# Patient Record
Sex: MALE | Race: WHITE | HISPANIC OR LATINO | URBAN - METROPOLITAN AREA
[De-identification: names, ages, dates, MRNs, and addresses within clinical notes are randomized per-mention and may not be internally consistent; named-entity substitution may affect disease eponyms.]

---

## 2017-06-23 ENCOUNTER — OUTPATIENT (OUTPATIENT)
Dept: OUTPATIENT SERVICES | Facility: HOSPITAL | Age: 72
LOS: 1 days | Discharge: HOME | End: 2017-06-23

## 2017-06-23 DIAGNOSIS — C25.9 MALIGNANT NEOPLASM OF PANCREAS, UNSPECIFIED: ICD-10-CM

## 2017-06-23 DIAGNOSIS — K90.81 WHIPPLE'S DISEASE: Chronic | ICD-10-CM

## 2017-06-23 DIAGNOSIS — C25.9 MALIGNANT NEOPLASM OF PANCREAS, UNSPECIFIED: Chronic | ICD-10-CM

## 2017-06-29 ENCOUNTER — RESULT REVIEW (OUTPATIENT)
Age: 72
End: 2017-06-29

## 2017-06-29 ENCOUNTER — APPOINTMENT (OUTPATIENT)
Dept: HEMATOLOGY ONCOLOGY | Facility: CLINIC | Age: 72
End: 2017-06-29

## 2017-06-29 VITALS
TEMPERATURE: 98.3 F | SYSTOLIC BLOOD PRESSURE: 122 MMHG | HEART RATE: 72 BPM | DIASTOLIC BLOOD PRESSURE: 80 MMHG | OXYGEN SATURATION: 100 % | WEIGHT: 150.35 LBS | RESPIRATION RATE: 16 BRPM | BODY MASS INDEX: 24.75 KG/M2

## 2017-06-29 LAB
ALBUMIN SERPL ELPH-MCNC: 4 G/DL — SIGNIFICANT CHANGE UP (ref 3.3–5)
ALP SERPL-CCNC: 113 U/L — SIGNIFICANT CHANGE UP (ref 40–120)
ALT FLD-CCNC: 19 U/L — SIGNIFICANT CHANGE UP (ref 10–45)
ANION GAP SERPL CALC-SCNC: 18 MMOL/L — HIGH (ref 5–17)
AST SERPL-CCNC: 20 U/L — SIGNIFICANT CHANGE UP (ref 10–40)
BASOPHILS # BLD AUTO: 0 K/UL — SIGNIFICANT CHANGE UP (ref 0–0.2)
BASOPHILS NFR BLD AUTO: 0.6 % — SIGNIFICANT CHANGE UP (ref 0–2)
BILIRUB SERPL-MCNC: 0.4 MG/DL — SIGNIFICANT CHANGE UP (ref 0.2–1.2)
BUN SERPL-MCNC: 13 MG/DL — SIGNIFICANT CHANGE UP (ref 7–23)
CALCIUM SERPL-MCNC: 9.4 MG/DL — SIGNIFICANT CHANGE UP (ref 8.4–10.5)
CEA SERPL-MCNC: 6.3 NG/ML — HIGH (ref 0–3.8)
CHLORIDE SERPL-SCNC: 100 MMOL/L — SIGNIFICANT CHANGE UP (ref 96–108)
CO2 SERPL-SCNC: 26 MMOL/L — SIGNIFICANT CHANGE UP (ref 22–31)
CREAT SERPL-MCNC: 0.97 MG/DL — SIGNIFICANT CHANGE UP (ref 0.5–1.3)
EOSINOPHIL # BLD AUTO: 0.1 K/UL — SIGNIFICANT CHANGE UP (ref 0–0.5)
EOSINOPHIL NFR BLD AUTO: 2.1 % — SIGNIFICANT CHANGE UP (ref 0–6)
FERRITIN SERPL-MCNC: 82 NG/ML — SIGNIFICANT CHANGE UP (ref 30–400)
GLUCOSE SERPL-MCNC: 230 MG/DL — HIGH (ref 70–99)
IRON SATN MFR SERPL: 30 % — SIGNIFICANT CHANGE UP (ref 16–55)
IRON SATN MFR SERPL: 92 UG/DL — SIGNIFICANT CHANGE UP (ref 45–165)
LYMPHOCYTES # BLD AUTO: 1.2 K/UL — SIGNIFICANT CHANGE UP (ref 1–3.3)
LYMPHOCYTES # BLD AUTO: 27.2 % — SIGNIFICANT CHANGE UP (ref 13–44)
MONOCYTES # BLD AUTO: 0.2 K/UL — SIGNIFICANT CHANGE UP (ref 0–0.9)
MONOCYTES NFR BLD AUTO: 5 % — SIGNIFICANT CHANGE UP (ref 2–14)
NEUTROPHILS # BLD AUTO: 2.8 K/UL — SIGNIFICANT CHANGE UP (ref 1.8–7.4)
NEUTROPHILS NFR BLD AUTO: 65.1 % — SIGNIFICANT CHANGE UP (ref 43–77)
POTASSIUM SERPL-MCNC: 5 MMOL/L — SIGNIFICANT CHANGE UP (ref 3.5–5.3)
POTASSIUM SERPL-SCNC: 5 MMOL/L — SIGNIFICANT CHANGE UP (ref 3.5–5.3)
PROT SERPL-MCNC: 7.1 G/DL — SIGNIFICANT CHANGE UP (ref 6–8.3)
SODIUM SERPL-SCNC: 144 MMOL/L — SIGNIFICANT CHANGE UP (ref 135–145)
TIBC SERPL-MCNC: 304 UG/DL — SIGNIFICANT CHANGE UP (ref 220–430)
UIBC SERPL-MCNC: 212 UG/DL — SIGNIFICANT CHANGE UP (ref 110–370)

## 2017-06-29 RX ORDER — NYSTATIN 100000 [USP'U]/G
100000 CREAM TOPICAL
Qty: 30 | Refills: 0 | Status: ACTIVE | COMMUNITY
Start: 2017-01-23

## 2017-06-29 RX ORDER — NEOMYCIN AND POLYMYXIN B SULFATES AND DEXAMETHASONE 3.5; 10000; 1 MG/G; [IU]/G; MG/G
3.5-10000-0.1 OINTMENT OPHTHALMIC
Qty: 4 | Refills: 0 | Status: ACTIVE | COMMUNITY
Start: 2017-06-20

## 2017-06-29 RX ORDER — OFLOXACIN 3 MG/ML
0.3 SOLUTION/ DROPS OPHTHALMIC
Qty: 10 | Refills: 0 | Status: ACTIVE | COMMUNITY
Start: 2016-12-29

## 2017-06-29 RX ORDER — FINASTERIDE 5 MG/1
5 TABLET, FILM COATED ORAL
Qty: 30 | Refills: 0 | Status: ACTIVE | COMMUNITY
Start: 2017-04-05

## 2017-06-29 RX ORDER — PREDNISOLONE ACETATE 10 MG/ML
1 SUSPENSION/ DROPS OPHTHALMIC
Qty: 10 | Refills: 0 | Status: ACTIVE | COMMUNITY
Start: 2017-01-23

## 2017-06-29 RX ORDER — POLYVINYL ALCOHOL 14 MG/ML
1.4 SOLUTION/ DROPS OPHTHALMIC
Qty: 15 | Refills: 0 | Status: ACTIVE | COMMUNITY
Start: 2017-06-20

## 2017-06-29 RX ORDER — OMEPRAZOLE 40 MG/1
40 CAPSULE, DELAYED RELEASE ORAL
Qty: 30 | Refills: 2 | Status: ACTIVE | COMMUNITY
Start: 2017-06-29

## 2017-06-29 RX ORDER — TAMSULOSIN HYDROCHLORIDE 0.4 MG/1
0.4 CAPSULE ORAL
Qty: 30 | Refills: 0 | Status: ACTIVE | COMMUNITY
Start: 2017-04-05

## 2017-06-30 LAB — CANCER AG19-9 SERPL-ACNC: 16.1 U/ML — SIGNIFICANT CHANGE UP

## 2017-07-05 DIAGNOSIS — C24.1 MALIGNANT NEOPLASM OF AMPULLA OF VATER: ICD-10-CM

## 2017-07-07 ENCOUNTER — OUTPATIENT (OUTPATIENT)
Dept: OUTPATIENT SERVICES | Facility: HOSPITAL | Age: 72
LOS: 1 days | End: 2017-07-07
Payer: MEDICAID

## 2017-07-07 ENCOUNTER — APPOINTMENT (OUTPATIENT)
Dept: CT IMAGING | Facility: IMAGING CENTER | Age: 72
End: 2017-07-07

## 2017-07-07 DIAGNOSIS — C24.1 MALIGNANT NEOPLASM OF AMPULLA OF VATER: ICD-10-CM

## 2017-07-07 DIAGNOSIS — K90.81 WHIPPLE'S DISEASE: Chronic | ICD-10-CM

## 2017-07-07 DIAGNOSIS — C25.9 MALIGNANT NEOPLASM OF PANCREAS, UNSPECIFIED: Chronic | ICD-10-CM

## 2017-07-07 PROCEDURE — 82565 ASSAY OF CREATININE: CPT

## 2017-07-07 PROCEDURE — 74177 CT ABD & PELVIS W/CONTRAST: CPT

## 2017-07-21 ENCOUNTER — APPOINTMENT (OUTPATIENT)
Dept: ULTRASOUND IMAGING | Facility: IMAGING CENTER | Age: 72
End: 2017-07-21

## 2017-07-21 ENCOUNTER — OUTPATIENT (OUTPATIENT)
Dept: OUTPATIENT SERVICES | Facility: HOSPITAL | Age: 72
LOS: 1 days | End: 2017-07-21
Payer: MEDICAID

## 2017-07-21 ENCOUNTER — APPOINTMENT (OUTPATIENT)
Dept: MRI IMAGING | Facility: IMAGING CENTER | Age: 72
End: 2017-07-21

## 2017-07-21 DIAGNOSIS — K90.81 WHIPPLE'S DISEASE: Chronic | ICD-10-CM

## 2017-07-21 DIAGNOSIS — C25.9 MALIGNANT NEOPLASM OF PANCREAS, UNSPECIFIED: Chronic | ICD-10-CM

## 2017-07-21 DIAGNOSIS — Z00.8 ENCOUNTER FOR OTHER GENERAL EXAMINATION: ICD-10-CM

## 2017-07-21 PROCEDURE — A9585: CPT

## 2017-07-21 PROCEDURE — 82565 ASSAY OF CREATININE: CPT

## 2017-07-21 PROCEDURE — 74183 MRI ABD W/O CNTR FLWD CNTR: CPT

## 2017-07-21 PROCEDURE — 76705 ECHO EXAM OF ABDOMEN: CPT

## 2017-07-24 ENCOUNTER — OUTPATIENT (OUTPATIENT)
Dept: OUTPATIENT SERVICES | Facility: HOSPITAL | Age: 72
LOS: 1 days | Discharge: HOME | End: 2017-07-24

## 2017-07-24 DIAGNOSIS — C25.9 MALIGNANT NEOPLASM OF PANCREAS, UNSPECIFIED: Chronic | ICD-10-CM

## 2017-07-24 DIAGNOSIS — K90.81 WHIPPLE'S DISEASE: Chronic | ICD-10-CM

## 2017-07-24 DIAGNOSIS — C25.9 MALIGNANT NEOPLASM OF PANCREAS, UNSPECIFIED: ICD-10-CM

## 2017-07-27 ENCOUNTER — APPOINTMENT (OUTPATIENT)
Dept: HEMATOLOGY ONCOLOGY | Facility: CLINIC | Age: 72
End: 2017-07-27

## 2017-07-27 VITALS
SYSTOLIC BLOOD PRESSURE: 121 MMHG | DIASTOLIC BLOOD PRESSURE: 80 MMHG | WEIGHT: 149.47 LBS | HEART RATE: 78 BPM | BODY MASS INDEX: 24.6 KG/M2 | TEMPERATURE: 98.5 F | RESPIRATION RATE: 16 BRPM | OXYGEN SATURATION: 99 % | HEIGHT: 65.35 IN

## 2017-07-27 DIAGNOSIS — C24.1 MALIGNANT NEOPLASM OF AMPULLA OF VATER: ICD-10-CM

## 2017-07-27 DIAGNOSIS — K76.0 FATTY (CHANGE OF) LIVER, NOT ELSEWHERE CLASSIFIED: ICD-10-CM

## 2017-07-27 DIAGNOSIS — Z90.49 ACQUIRED ABSENCE OF OTHER SPECIFIED PARTS OF DIGESTIVE TRACT: ICD-10-CM

## 2017-07-28 DIAGNOSIS — C24.1 MALIGNANT NEOPLASM OF AMPULLA OF VATER: ICD-10-CM

## 2017-08-07 ENCOUNTER — APPOINTMENT (OUTPATIENT)
Dept: NUCLEAR MEDICINE | Facility: IMAGING CENTER | Age: 72
End: 2017-08-07
Payer: MEDICAID

## 2017-08-07 ENCOUNTER — OUTPATIENT (OUTPATIENT)
Dept: OUTPATIENT SERVICES | Facility: HOSPITAL | Age: 72
LOS: 1 days | End: 2017-08-07
Payer: MEDICAID

## 2017-08-07 DIAGNOSIS — C25.9 MALIGNANT NEOPLASM OF PANCREAS, UNSPECIFIED: Chronic | ICD-10-CM

## 2017-08-07 DIAGNOSIS — K90.81 WHIPPLE'S DISEASE: Chronic | ICD-10-CM

## 2017-08-07 DIAGNOSIS — C24.1 MALIGNANT NEOPLASM OF AMPULLA OF VATER: ICD-10-CM

## 2017-08-07 PROCEDURE — 78815 PET IMAGE W/CT SKULL-THIGH: CPT | Mod: 26,PS

## 2017-08-07 PROCEDURE — 78815 PET IMAGE W/CT SKULL-THIGH: CPT

## 2017-08-07 PROCEDURE — A9552: CPT

## 2017-08-17 ENCOUNTER — APPOINTMENT (OUTPATIENT)
Dept: HEMATOLOGY ONCOLOGY | Facility: CLINIC | Age: 72
End: 2017-08-17

## 2017-08-24 ENCOUNTER — APPOINTMENT (OUTPATIENT)
Dept: HEMATOLOGY ONCOLOGY | Facility: CLINIC | Age: 72
End: 2017-08-24

## 2017-09-12 ENCOUNTER — APPOINTMENT (OUTPATIENT)
Dept: UROLOGY | Facility: CLINIC | Age: 72
End: 2017-09-12

## 2017-11-01 ENCOUNTER — OUTPATIENT (OUTPATIENT)
Dept: OUTPATIENT SERVICES | Facility: HOSPITAL | Age: 72
LOS: 1 days | End: 2017-11-01

## 2017-11-02 ENCOUNTER — INPATIENT (INPATIENT)
Facility: HOSPITAL | Age: 72
LOS: 14 days | Discharge: HOME HEALTH | End: 2017-11-17
Attending: HOSPITALIST | Admitting: HOSPITALIST
Payer: MEDICAID

## 2017-11-02 VITALS
OXYGEN SATURATION: 100 % | DIASTOLIC BLOOD PRESSURE: 97 MMHG | HEART RATE: 65 BPM | SYSTOLIC BLOOD PRESSURE: 145 MMHG | RESPIRATION RATE: 14 BRPM | TEMPERATURE: 98 F

## 2017-11-02 DIAGNOSIS — N17.9 ACUTE KIDNEY FAILURE, UNSPECIFIED: ICD-10-CM

## 2017-11-02 DIAGNOSIS — N13.30 UNSPECIFIED HYDRONEPHROSIS: ICD-10-CM

## 2017-11-02 DIAGNOSIS — K90.81 WHIPPLE'S DISEASE: Chronic | ICD-10-CM

## 2017-11-02 DIAGNOSIS — R10.9 UNSPECIFIED ABDOMINAL PAIN: ICD-10-CM

## 2017-11-02 DIAGNOSIS — C25.9 MALIGNANT NEOPLASM OF PANCREAS, UNSPECIFIED: Chronic | ICD-10-CM

## 2017-11-02 DIAGNOSIS — N40.0 BENIGN PROSTATIC HYPERPLASIA WITHOUT LOWER URINARY TRACT SYMPTOMS: ICD-10-CM

## 2017-11-02 DIAGNOSIS — I71.4 ABDOMINAL AORTIC ANEURYSM, WITHOUT RUPTURE: ICD-10-CM

## 2017-11-02 DIAGNOSIS — C25.9 MALIGNANT NEOPLASM OF PANCREAS, UNSPECIFIED: ICD-10-CM

## 2017-11-02 DIAGNOSIS — Z29.9 ENCOUNTER FOR PROPHYLACTIC MEASURES, UNSPECIFIED: ICD-10-CM

## 2017-11-02 LAB
ALBUMIN SERPL ELPH-MCNC: 4 G/DL — SIGNIFICANT CHANGE UP (ref 3.3–5)
ALP SERPL-CCNC: 243 U/L — HIGH (ref 40–120)
ALT FLD-CCNC: 37 U/L — SIGNIFICANT CHANGE UP (ref 4–41)
APPEARANCE UR: CLEAR — SIGNIFICANT CHANGE UP
APTT BLD: 32.2 SEC — SIGNIFICANT CHANGE UP (ref 27.5–37.4)
AST SERPL-CCNC: 20 U/L — SIGNIFICANT CHANGE UP (ref 4–40)
BASE EXCESS BLDV CALC-SCNC: 5.7 MMOL/L — SIGNIFICANT CHANGE UP
BASOPHILS # BLD AUTO: 0.04 K/UL — SIGNIFICANT CHANGE UP (ref 0–0.2)
BASOPHILS NFR BLD AUTO: 0.9 % — SIGNIFICANT CHANGE UP (ref 0–2)
BILIRUB SERPL-MCNC: 0.5 MG/DL — SIGNIFICANT CHANGE UP (ref 0.2–1.2)
BILIRUB UR-MCNC: NEGATIVE — SIGNIFICANT CHANGE UP
BLOOD GAS VENOUS - CREATININE: 1.58 MG/DL — HIGH (ref 0.5–1.3)
BLOOD UR QL VISUAL: NEGATIVE — SIGNIFICANT CHANGE UP
BUN SERPL-MCNC: 16 MG/DL — SIGNIFICANT CHANGE UP (ref 7–23)
CALCIUM SERPL-MCNC: 9.2 MG/DL — SIGNIFICANT CHANGE UP (ref 8.4–10.5)
CHLORIDE BLDV-SCNC: 101 MMOL/L — SIGNIFICANT CHANGE UP (ref 96–108)
CHLORIDE SERPL-SCNC: 100 MMOL/L — SIGNIFICANT CHANGE UP (ref 98–107)
CO2 SERPL-SCNC: 29 MMOL/L — SIGNIFICANT CHANGE UP (ref 22–31)
COLOR SPEC: SIGNIFICANT CHANGE UP
CREAT SERPL-MCNC: 1.66 MG/DL — HIGH (ref 0.5–1.3)
EOSINOPHIL # BLD AUTO: 0.1 K/UL — SIGNIFICANT CHANGE UP (ref 0–0.5)
EOSINOPHIL NFR BLD AUTO: 2.2 % — SIGNIFICANT CHANGE UP (ref 0–6)
GAS PNL BLDV: 137 MMOL/L — SIGNIFICANT CHANGE UP (ref 136–146)
GLUCOSE BLDV-MCNC: 102 — HIGH (ref 70–99)
GLUCOSE SERPL-MCNC: 99 MG/DL — SIGNIFICANT CHANGE UP (ref 70–99)
GLUCOSE UR-MCNC: 300 — SIGNIFICANT CHANGE UP
HCO3 BLDV-SCNC: 27 MMOL/L — SIGNIFICANT CHANGE UP (ref 20–27)
HCT VFR BLD CALC: 36.2 % — LOW (ref 39–50)
HCT VFR BLDV CALC: 38 % — LOW (ref 39–51)
HGB BLD-MCNC: 11.8 G/DL — LOW (ref 13–17)
HGB BLDV-MCNC: 12.3 G/DL — LOW (ref 13–17)
IMM GRANULOCYTES # BLD AUTO: 0.02 # — SIGNIFICANT CHANGE UP
IMM GRANULOCYTES NFR BLD AUTO: 0.4 % — SIGNIFICANT CHANGE UP (ref 0–1.5)
INR BLD: 1.07 — SIGNIFICANT CHANGE UP (ref 0.88–1.17)
KETONES UR-MCNC: NEGATIVE — SIGNIFICANT CHANGE UP
LACTATE BLDV-MCNC: 0.8 MMOL/L — SIGNIFICANT CHANGE UP (ref 0.5–2)
LEUKOCYTE ESTERASE UR-ACNC: NEGATIVE — SIGNIFICANT CHANGE UP
LIDOCAIN IGE QN: 17.3 U/L — SIGNIFICANT CHANGE UP (ref 7–60)
LYMPHOCYTES # BLD AUTO: 0.93 K/UL — LOW (ref 1–3.3)
LYMPHOCYTES # BLD AUTO: 20.9 % — SIGNIFICANT CHANGE UP (ref 13–44)
MCHC RBC-ENTMCNC: 29.7 PG — SIGNIFICANT CHANGE UP (ref 27–34)
MCHC RBC-ENTMCNC: 32.6 % — SIGNIFICANT CHANGE UP (ref 32–36)
MCV RBC AUTO: 91.2 FL — SIGNIFICANT CHANGE UP (ref 80–100)
MONOCYTES # BLD AUTO: 0.32 K/UL — SIGNIFICANT CHANGE UP (ref 0–0.9)
MONOCYTES NFR BLD AUTO: 7.2 % — SIGNIFICANT CHANGE UP (ref 2–14)
NEUTROPHILS # BLD AUTO: 3.04 K/UL — SIGNIFICANT CHANGE UP (ref 1.8–7.4)
NEUTROPHILS NFR BLD AUTO: 68.4 % — SIGNIFICANT CHANGE UP (ref 43–77)
NITRITE UR-MCNC: NEGATIVE — SIGNIFICANT CHANGE UP
NRBC # FLD: 0 — SIGNIFICANT CHANGE UP
PCO2 BLDV: 56 MMHG — HIGH (ref 41–51)
PH BLDV: 7.36 PH — SIGNIFICANT CHANGE UP (ref 7.32–7.43)
PH UR: 7 — SIGNIFICANT CHANGE UP (ref 4.6–8)
PLATELET # BLD AUTO: 227 K/UL — SIGNIFICANT CHANGE UP (ref 150–400)
PMV BLD: 10.3 FL — SIGNIFICANT CHANGE UP (ref 7–13)
PO2 BLDV: < 24 MMHG — LOW (ref 35–40)
POTASSIUM BLDV-SCNC: 4.6 MMOL/L — HIGH (ref 3.4–4.5)
POTASSIUM SERPL-MCNC: 5 MMOL/L — SIGNIFICANT CHANGE UP (ref 3.5–5.3)
POTASSIUM SERPL-SCNC: 5 MMOL/L — SIGNIFICANT CHANGE UP (ref 3.5–5.3)
PROT SERPL-MCNC: 7.1 G/DL — SIGNIFICANT CHANGE UP (ref 6–8.3)
PROT UR-MCNC: NEGATIVE — SIGNIFICANT CHANGE UP
PROTHROM AB SERPL-ACNC: 12 SEC — SIGNIFICANT CHANGE UP (ref 9.8–13.1)
RBC # BLD: 3.97 M/UL — LOW (ref 4.2–5.8)
RBC # FLD: 12.9 % — SIGNIFICANT CHANGE UP (ref 10.3–14.5)
SAO2 % BLDV: 20 % — LOW (ref 60–85)
SODIUM SERPL-SCNC: 137 MMOL/L — SIGNIFICANT CHANGE UP (ref 135–145)
SP GR SPEC: 1.01 — SIGNIFICANT CHANGE UP (ref 1–1.03)
UROBILINOGEN FLD QL: NORMAL E.U. — SIGNIFICANT CHANGE UP (ref 0.1–0.2)
WBC # BLD: 4.45 K/UL — SIGNIFICANT CHANGE UP (ref 3.8–10.5)
WBC # FLD AUTO: 4.45 K/UL — SIGNIFICANT CHANGE UP (ref 3.8–10.5)

## 2017-11-02 PROCEDURE — 52332 CYSTOSCOPY AND TREATMENT: CPT | Mod: RT

## 2017-11-02 PROCEDURE — 99223 1ST HOSP IP/OBS HIGH 75: CPT | Mod: GC

## 2017-11-02 PROCEDURE — 74420 UROGRAPHY RTRGR +-KUB: CPT | Mod: 26

## 2017-11-02 PROCEDURE — 74177 CT ABD & PELVIS W/CONTRAST: CPT | Mod: 26

## 2017-11-02 RX ORDER — FERROUS SULFATE 325(65) MG
325 TABLET ORAL DAILY
Qty: 0 | Refills: 0 | Status: DISCONTINUED | OUTPATIENT
Start: 2017-11-02 | End: 2017-11-17

## 2017-11-02 RX ORDER — ENOXAPARIN SODIUM 100 MG/ML
40 INJECTION SUBCUTANEOUS EVERY 24 HOURS
Qty: 0 | Refills: 0 | Status: DISCONTINUED | OUTPATIENT
Start: 2017-11-02 | End: 2017-11-02

## 2017-11-02 RX ORDER — FINASTERIDE 5 MG/1
5 TABLET, FILM COATED ORAL DAILY
Qty: 0 | Refills: 0 | Status: DISCONTINUED | OUTPATIENT
Start: 2017-11-02 | End: 2017-11-17

## 2017-11-02 RX ORDER — MORPHINE SULFATE 50 MG/1
4 CAPSULE, EXTENDED RELEASE ORAL ONCE
Qty: 0 | Refills: 0 | Status: DISCONTINUED | OUTPATIENT
Start: 2017-11-02 | End: 2017-11-02

## 2017-11-02 RX ORDER — ACETAMINOPHEN 500 MG
650 TABLET ORAL EVERY 6 HOURS
Qty: 0 | Refills: 0 | Status: DISCONTINUED | OUTPATIENT
Start: 2017-11-02 | End: 2017-11-17

## 2017-11-02 RX ORDER — TAMSULOSIN HYDROCHLORIDE 0.4 MG/1
0.4 CAPSULE ORAL AT BEDTIME
Qty: 0 | Refills: 0 | Status: DISCONTINUED | OUTPATIENT
Start: 2017-11-02 | End: 2017-11-17

## 2017-11-02 RX ORDER — POLYETHYLENE GLYCOL 3350 17 G/17G
17 POWDER, FOR SOLUTION ORAL DAILY
Qty: 0 | Refills: 0 | Status: DISCONTINUED | OUTPATIENT
Start: 2017-11-02 | End: 2017-11-05

## 2017-11-02 RX ORDER — PIPERACILLIN AND TAZOBACTAM 4; .5 G/20ML; G/20ML
3.38 INJECTION, POWDER, LYOPHILIZED, FOR SOLUTION INTRAVENOUS EVERY 8 HOURS
Qty: 0 | Refills: 0 | Status: DISCONTINUED | OUTPATIENT
Start: 2017-11-02 | End: 2017-11-07

## 2017-11-02 RX ORDER — SENNA PLUS 8.6 MG/1
2 TABLET ORAL AT BEDTIME
Qty: 0 | Refills: 0 | Status: DISCONTINUED | OUTPATIENT
Start: 2017-11-02 | End: 2017-11-17

## 2017-11-02 RX ORDER — SODIUM CHLORIDE 9 MG/ML
2000 INJECTION INTRAMUSCULAR; INTRAVENOUS; SUBCUTANEOUS ONCE
Qty: 0 | Refills: 0 | Status: COMPLETED | OUTPATIENT
Start: 2017-11-02 | End: 2017-11-02

## 2017-11-02 RX ORDER — HEPARIN SODIUM 5000 [USP'U]/ML
5000 INJECTION INTRAVENOUS; SUBCUTANEOUS EVERY 8 HOURS
Qty: 0 | Refills: 0 | Status: DISCONTINUED | OUTPATIENT
Start: 2017-11-02 | End: 2017-11-10

## 2017-11-02 RX ORDER — SODIUM CHLORIDE 9 MG/ML
1000 INJECTION INTRAMUSCULAR; INTRAVENOUS; SUBCUTANEOUS
Qty: 0 | Refills: 0 | Status: DISCONTINUED | OUTPATIENT
Start: 2017-11-02 | End: 2017-11-02

## 2017-11-02 RX ORDER — DOCUSATE SODIUM 100 MG
100 CAPSULE ORAL THREE TIMES A DAY
Qty: 0 | Refills: 0 | Status: DISCONTINUED | OUTPATIENT
Start: 2017-11-02 | End: 2017-11-17

## 2017-11-02 RX ORDER — SODIUM CHLORIDE 9 MG/ML
1000 INJECTION INTRAMUSCULAR; INTRAVENOUS; SUBCUTANEOUS ONCE
Qty: 0 | Refills: 0 | Status: COMPLETED | OUTPATIENT
Start: 2017-11-02 | End: 2017-11-02

## 2017-11-02 RX ORDER — SODIUM CHLORIDE 9 MG/ML
1000 INJECTION INTRAMUSCULAR; INTRAVENOUS; SUBCUTANEOUS ONCE
Qty: 0 | Refills: 0 | Status: DISCONTINUED | OUTPATIENT
Start: 2017-11-02 | End: 2017-11-02

## 2017-11-02 RX ORDER — SODIUM CHLORIDE 9 MG/ML
1000 INJECTION, SOLUTION INTRAVENOUS
Qty: 0 | Refills: 0 | Status: DISCONTINUED | OUTPATIENT
Start: 2017-11-02 | End: 2017-11-03

## 2017-11-02 RX ORDER — HYDROMORPHONE HYDROCHLORIDE 2 MG/ML
0.5 INJECTION INTRAMUSCULAR; INTRAVENOUS; SUBCUTANEOUS EVERY 6 HOURS
Qty: 0 | Refills: 0 | Status: DISCONTINUED | OUTPATIENT
Start: 2017-11-02 | End: 2017-11-05

## 2017-11-02 RX ADMIN — Medication 650 MILLIGRAM(S): at 19:22

## 2017-11-02 RX ADMIN — Medication 325 MILLIGRAM(S): at 19:09

## 2017-11-02 RX ADMIN — MORPHINE SULFATE 4 MILLIGRAM(S): 50 CAPSULE, EXTENDED RELEASE ORAL at 14:47

## 2017-11-02 RX ADMIN — SODIUM CHLORIDE 1000 MILLILITER(S): 9 INJECTION INTRAMUSCULAR; INTRAVENOUS; SUBCUTANEOUS at 17:51

## 2017-11-02 RX ADMIN — SODIUM CHLORIDE 2000 MILLILITER(S): 9 INJECTION INTRAMUSCULAR; INTRAVENOUS; SUBCUTANEOUS at 11:51

## 2017-11-02 RX ADMIN — Medication 650 MILLIGRAM(S): at 19:10

## 2017-11-02 RX ADMIN — SODIUM CHLORIDE 75 MILLILITER(S): 9 INJECTION INTRAMUSCULAR; INTRAVENOUS; SUBCUTANEOUS at 17:53

## 2017-11-02 RX ADMIN — Medication 30 MILLILITER(S): at 15:16

## 2017-11-02 RX ADMIN — POLYETHYLENE GLYCOL 3350 17 GRAM(S): 17 POWDER, FOR SOLUTION ORAL at 19:10

## 2017-11-02 RX ADMIN — PIPERACILLIN AND TAZOBACTAM 25 GRAM(S): 4; .5 INJECTION, POWDER, LYOPHILIZED, FOR SOLUTION INTRAVENOUS at 20:01

## 2017-11-02 RX ADMIN — FINASTERIDE 5 MILLIGRAM(S): 5 TABLET, FILM COATED ORAL at 19:10

## 2017-11-02 RX ADMIN — MORPHINE SULFATE 4 MILLIGRAM(S): 50 CAPSULE, EXTENDED RELEASE ORAL at 11:51

## 2017-11-02 NOTE — H&P ADULT - NSHPPHYSICALEXAM_GEN_ALL_CORE
PHYSICAL EXAM:  Vital Signs Last 24 Hrs  T(C): 36.6 (11-02-17 @ 09:36)  T(F): 97.8 (11-02-17 @ 09:36), Max: 97.8 (11-02-17 @ 09:36)  HR: 61 (11-02-17 @ 14:50) (61 - 65)  BP: 167/90 (11-02-17 @ 14:50)  BP(mean): --  RR: 18 (11-02-17 @ 14:50) (14 - 18)  SpO2: 99% (11-02-17 @ 14:50) (99% - 100%)  Wt(kg): --    Constitutional: NAD, awake and alert  EYES: EOMI  ENT:  Normal Hearing, no tonsillar exudates   Neck: Soft and supple, No JVD  Respiratory: Breath sounds are clear bilaterally, No wheezing, rales or rhonchi  Cardiovascular: S1 and S2, regular rate and rhythm, no Murmurs, gallops or rubs  Gastrointestinal: Well healed midline scar across the abdomen. Bowel Sounds present, soft, tender to deep palpation of the epigastrium, nondistended, no guarding, no rebound  Extremities: No cyanosis or clubbing; warm to touch  Vascular: 2+ peripheral pulses lower ex  Neurological: A/O x 3, no focal deficits  Musculoskeletal: 5/5 strength b/l upper and lower extremities  Skin: No rashes, warm & dry  Psych: no depression or anhedonia  HEME: no bruises, no nose bleeds

## 2017-11-02 NOTE — PATIENT PROFILE ADULT. - VISION (WITH CORRECTIVE LENSES IF THE PATIENT USUALLY WEARS THEM):
Partially impaired: cannot see medication labels or newsprint, but can see obstacles in path, and the surrounding layout; can count fingers at arm's length/prescription glasses

## 2017-11-02 NOTE — H&P ADULT - HISTORY OF PRESENT ILLNESS
72M pmhx of pancreatic adenocarcinoma s/p whipple on chemo p/w abdominal pain, epigastric, migrating to RUQ, worse with movement x 2 months, worse over past 2 weeks after traveling to  [facility] fever 6d ago. Constipated x 3d. Passing flatus. Denies vomiting, diarrhea, chest pain, dyspnea.    CT A/P: Status post Whipple procedure with no significant change in appearance of the pancreas.  Interval progression of soft tissue recurrence in the retroperitoneum with progression in moderate right hydronephrosis.  Suggestion of multiple arterially enhancing lesions at the hepatic dome.  There is progression of moderate right hydronephrosis secondary to the retroperitoneal mass,   located between the duodenum and IVC, which has progressed since the PET/CT ( Aug 2017), now measuring 2.1 x 2 cm 72M pmhx of pancreatic adenocarcinoma( 2015) s/p whipple/ s/p gemcitabine X 6 cycles, and 1 month of radiation( last tx with gemcitabine in 2016) and xeloda p/w abdominal pain x 2 weeks. Patient is Croatian only speaking,  services offered and declined, daughter at bedside provides translation. Patient states the abdominal pain is cramping in nature, begins in the epigastrium migrates to RUQ, starts at 1-2 worsens to 6/10 with regards to pain. Patient denies any diarrhea, states he is constipatied last 3 days, states when he goes its small round balls. States he had one episode of fever last week (38 celsius) x once. Denies vomiting, diarrhea, chest pain, dyspnea, melena, hematochezia. Recent travel to the [place]. No sick contacts    VS in the ED: T: 97.8, HR:61-65, BP: 145//90, RR: 14 100% on RA    Labs personally reviewed:  CBC significant for normocytic anemia Hgb of 11.8 ( baseline 12). CMP significant for an HILTON Cr of 1.66 ( baseline Cr close to 1), Alk phos of 243. Bicarb 29, AG 6. VBG w/o lactate, Ph of 7.36 with pCO2 of 56     CT A/P: Status post Whipple procedure with no significant change in appearance of the pancreas.  Interval progression of soft tissue recurrence in the retroperitoneum with progression in moderate right hydronephrosis.  Suggestion of multiple arterially enhancing lesions at the hepatic dome.  There is progression of moderate right hydronephrosis secondary to the retroperitoneal mass,   located between the duodenum and IVC, which has progressed since the PET/CT ( Aug 2017), now measuring 2.1 x 2 cm  A 3.5 x 3.3 cm infrarenal aortic aneurysm

## 2017-11-02 NOTE — PROGRESS NOTE ADULT - ASSESSMENT
Patient is currently febrile despite Tylenol.  Given persistent fever, recommend emergent cystoscopy, ureteral stent placement.    Patient had PO intake at 6pm however given fever and potential for sepsis, requires emergent ureteral stent.

## 2017-11-02 NOTE — ED PROVIDER NOTE - CARE PLAN
Principal Discharge DX:	HILTON (acute kidney injury)  Secondary Diagnosis:	Hydronephrosis, unspecified hydronephrosis type

## 2017-11-02 NOTE — PROGRESS NOTE ADULT - SUBJECTIVE AND OBJECTIVE BOX
Patient seen and examined.  Full consult in chart.  Currently feels well with complaints of epigastric pain.  Denies flank pain or dysuria.    Vital Signs Last 24 Hrs  T(C): 37.1 (02 Nov 2017 21:22), Max: 38.9 (02 Nov 2017 20:22)  T(F): 98.7 (02 Nov 2017 21:22), Max: 102 (02 Nov 2017 20:22)  HR: 85 (02 Nov 2017 21:22) (61 - 92)  BP: 115/76 (02 Nov 2017 21:22) (115/76 - 167/90)  BP(mean): --  RR: 20 (02 Nov 2017 21:22) (14 - 22)  SpO2: 100% (02 Nov 2017 21:22) (98% - 100%)    Abdomen soft, nt, nd  no cvat  no sp tenderness      CT a/p w/ IV contrast reviewed.  Findings show right hydronephrosis with delayed nephrogram. Ureter dilated to the left of paracaval mass.   Prior PET/CT showed uptake in the paracaval mass consistent with recurrent pancreatic cancer.

## 2017-11-02 NOTE — H&P ADULT - NSHPREVIEWOFSYSTEMS_GEN_ALL_CORE
Constitutional: denies fevers, chills, night sweats, weight loss  HEENT: denies visual changes, hearing changes, rhinitis, odynophagia, or dysphagia  Cardiovascular: denies palpitations, chest pain, edema  Respiratory: denies SOB, wheezing  Gastrointestinal: abdominal pain present. denies N/V/D,  hematochezia, melena  : denies dysuria, hematuria  MSK: denies weakness, joint pain  Neuro: Denies Paresthesia/ weakness  Heme: Denies bleeding or hemoptysis   Skin: denies new rashes or masses

## 2017-11-02 NOTE — CONSULT NOTE ADULT - ASSESSMENT
72yoM with Hx pancreatic CA s/p Whipple, now with ? recurrence, possible mass effect on the R ureter causing moderate right hydronephrosis.  Normal contralateral kidney.

## 2017-11-02 NOTE — H&P ADULT - PROBLEM SELECTOR PLAN 2
- RP mass visualized on CT A/P with R hydronephrosis along with  multiple arterially enhancing lesions at the hepatic dome concerning for metastatic disease?  - Will consult Heme/onc to see patient  - Pain control for now

## 2017-11-02 NOTE — CONSULT NOTE ADULT - SUBJECTIVE AND OBJECTIVE BOX
HPI:  Patient is a 72y Male who presented with a 3 week history of epigastric and lower abdominal pain.  Pt has pancreatic adenocarcinoma s/p whipple.  EGD performed last month demonstrated duodenitis/gastritis.    Patient denies fevers, chills, nausea, vomiting, dysuria, hematuria, flank pain, urinary retention.  Patient endorses decreased PO intake 2/2 pain.  Endorses epigastric and lower abdominal pain.      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  Surgical scar present.  : No CVAT on either side  MSK: No edema present    LABS:  11-02 @ 10:50  WBC 4.45  / Hct 36.2  / SCr 1.66     11-02  137  |  100  |  16  ----------------------------<  99  5.0   |  29  |  1.66<H>    Ca    9.2      02 Nov 2017 10:50    TPro  7.1  /  Alb  4.0  /  TBili  0.5  /  DBili  x   /  AST  20  /  ALT  37  /  AlkPhos  243<H>  11-02    PT/INR - ( 02 Nov 2017 10:50 )   PT: 12.0 SEC;   INR: 1.07       PTT - ( 02 Nov 2017 10:50 )  PTT:32.2 SEC    RADIOLOGY:  IMPRESSION:  Status post Whipple procedure with no significant change in appearance of   the pancreas.  Interval progression of soft tissue recurrence in the retroperitoneum   with progression in moderate right hydronephrosis.  Suggestion of multiple arterially enhancing lesions at the hepatic dome.   This can be better evaluated with MRI on outpatient basis.

## 2017-11-02 NOTE — BRIEF OPERATIVE NOTE - PROCEDURE
<<-----Click on this checkbox to enter Procedure Placement of ureteral stent  11/02/2017    Active  [id number]

## 2017-11-02 NOTE — PATIENT PROFILE ADULT. - REASON FOR ADMISSION
Came for complaints of Epigastric Pain radiating from the right side Abdomen to the left side, no nausea or vomiting

## 2017-11-02 NOTE — ED PROVIDER NOTE - PROGRESS NOTE DETAILS
CT with progression of RP lesion in addition to new hepatic lesions. Discussed findings with patient and patient's daughter with  [id number]. Pt will follow up with PCP and oncology. Maalox ordered. Pain now minimal Increased moderate hydro of R kidney with HILTON, concern for obstruction 2/2 RP mass. Urology consulted Urology does not recommend intervention at this time. Pt only received 1.2L NS, will give 1L.

## 2017-11-02 NOTE — ED PROVIDER NOTE - OBJECTIVE STATEMENT
Site/Location - epigastric pain  Intensity / Severity - 7/10  Quality / Character - sharp  Onset / Duration - 2 months, progressively worsening over the past 2 weeks  Radiation - to right upper quad  Context - pain started while in the [place] 3 weeks ago.  Had a EGD and colonoscopy 2 months ago and pain started then.  EGD and colonoscopy showed gastritis/esophagitis and hemorrhoids  Aggravating factors - lying on side  Alleviating factors - sitting up  Associated Signs and Symptoms - no fever, no chills, no dysuria, 2 days ago last BM, no black or bloody stools, +flatus, no change in appetite Site/Location - epigastric pain  Intensity / Severity - 7/10  Quality / Character - sharp  Onset / Duration - 2 months, progressively worsening over the past 2 weeks  Radiation - to right upper quad  Context - pain started while in the [place] 3 weeks ago.  Had a EGD and colonoscopy 2 months ago and pain started then.  EGD and colonoscopy showed gastritis/esophagitis and hemorrhoids  Aggravating factors - lying on side  Alleviating factors - sitting up  Associated Signs and Symptoms - no fever, no chills, no dysuria, 2 days ago last BM, no black or bloody stools, +flatus, no change in appetite    72M pmh ?pancreatic adenocarcinoma s/p whipple, chemo p/w abdominal pain, epigastric, migrating to RUQ, worse with movement x 2 months, worse over past 2 weeks after traveling to [place] fever 6d ago. Constipated x 3d. Passing flatus. Fevers Denies vomiting, diarrhea, chest pain, dyspnea. Site/Location - epigastric pain  Intensity / Severity - 7/10  Quality / Character - sharp  Onset / Duration - 2 months, progressively worsening over the past 2 weeks  Radiation - to right upper quad  Context - pain started while in the [place] 3 weeks ago.  Had a EGD and colonoscopy 2 months ago and pain started then.  EGD and colonoscopy showed gastritis/esophagitis and hemorrhoids  Aggravating factors - lying on side  Alleviating factors - sitting up  Associated Signs and Symptoms - no fever, no chills, no dysuria, 2 days ago last BM, no black or bloody stools, +flatus, no change in appetite    72M pmh ?pancreatic adenocarcinoma s/p whipple, chemo p/w abdominal pain, epigastric, migrating to RUQ, worse with movement x 2 months, worse over past 2 weeks after traveling to [place] fever 6d ago. Constipated x 3d. Passing flatus. Denies vomiting, diarrhea, chest pain, dyspnea.

## 2017-11-02 NOTE — H&P ADULT - PROBLEM SELECTOR PLAN 3
- Patient with hydronephrosis of R kidney seen on CT A/P 2/2 RP mass  - Urology consulted in the ED, appreciate recommendations  - No intervention per Urology at this time however Please page urology immediately at #[phone] for any fever > 100.4, as patient will need urgent decompression.  - Enlarged prostate seen on CT A/P

## 2017-11-02 NOTE — H&P ADULT - NSHPLABSRESULTS_GEN_ALL_CORE
11-02    137  |  100  |  16  ----------------------------<  99  5.0   |  29  |  1.66<H>    Ca    9.2      02 Nov 2017 10:50    TPro  7.1  /  Alb  4.0  /  TBili  0.5  /  DBili  x   /  AST  20  /  ALT  37  /  AlkPhos  243<H>  11-02                          11.8   4.45  )-----------( 227      ( 02 Nov 2017 10:50 )             36.2     PT/INR - ( 02 Nov 2017 10:50 )   PT: 12.0 SEC;   INR: 1.07          PTT - ( 02 Nov 2017 10:50 )  PTT:32.2 SEC                  < from: CT Abdomen and Pelvis w/ Oral Cont and w/ IV Cont (11.02.17 @ 13:10) >    Status post Whipple procedure with no significant change in appearance of   the pancreas.  Interval progression of soft tissue recurrence in the retroperitoneum   with progression in moderate right hydronephrosis.  Suggestion of multiple arterially enhancing lesions at the hepatic dome.   This can be better evaluated with MRI on outpatient basis.    < end of copied text >        CAPILLARY BLOOD GLUCOSE

## 2017-11-02 NOTE — ED PROVIDER NOTE - PHYSICAL EXAMINATION
ATTENDING PHYSICAL EXAM DR. MASON ***GEN - NAD; well appearing; A+O x3 ***HEAD - NC/AT ***EYES/NOSE - PERRL, EOMI, mucous membranes moist, no discharge ***THROAT: Oral cavity and pharynx normal. No inflammation, swelling, exudate, or lesions.  ***NECK: Neck supple, non-tender without lymphadenopathy, no masses, no thyromegaly.   ***PULMONARY - CTA b/l, symmetric breath sounds. ***CARDIAC -s1s2, RRR, no M,G,R  ***ABDOMEN - +BS, ND, epigastric tend, no Grigsby's, soft, no guarding, no rebound, no masses   ***BACK - no CVA tenderness, Normal  spine ***EXTREMITIES - symmetric pulses, 2+ dp, capillary refill < 2 seconds, no clubbing, no cyanosis, no edema ***SKIN - no rash or bruising   ***NEUROLOGIC - alert

## 2017-11-02 NOTE — H&P ADULT - ASSESSMENT
72M pmhx of pancreatic adenocarcinoma( 2015) s/p whipple/ s/p gemcitabine X 6 cycles, and 1 month of radiation( last tx with gemcitabine in 2016) and xeloda p/w abdominal pain x 2 weeks, found on CT to have likely recurrence of Pancreatic Ca

## 2017-11-02 NOTE — H&P ADULT - PROBLEM SELECTOR PLAN 4
- Likely multifactorial 2/2 RP mass causing hydronephrosis vs. BPH vs. Pre-renal 2/2 poor po intake  - Will check Urine studies  - s/p 1 L NS in the ED. Start 75 CC/hr for 12hrs afterwards  - Monitor BMP QD  - Avoid nephrotoxic agents

## 2017-11-02 NOTE — ED PROVIDER NOTE - MEDICAL DECISION MAKING DETAILS
Upper abdominal pain hx of pancreatic cancer s/p whipple, not on chemo/radiation for past 2 years.  R/O SBO1) IV access/IVF/LABS/Lactate 2) XRAY/CT Abd pelvis 3) Pain control/antiemetics as needed/ngt if necessary 4) reassess 5) Surgical consultation if necessary Upper abdominal pain hx of pancreatic cancer s/p whipple, not on chemo/radiation for past 2 years.  R/O SBO1) IV access/IVF/LABS/Lactate 2) XRAY/CT Abd pelvis 3) Pain control/antiemetics as needed/ngt if necessary 4) reassess 5) Surgical consultation if necessary  Foster Moraes, PGY2: 72M pancreatic CA with recurrence in August PET scan, now with epigastric tenderness concerned for SBO vs recurrence. Labs, CT a/p with IV/PO contrast. IVF. Pain control

## 2017-11-02 NOTE — H&P ADULT - PROBLEM SELECTOR PLAN 1
- Patient with epigastric pain radiating to RUQ now improved with morphine 4mg  - Abdominal pain likely 2/2 RP mass concerning for Recurrence of Pancreatic Ca vs. new malignancy?  CEA elevated in June of 6.3  - Will focus on pain control at this time, avoid morphine in setting of HILTON  - Will start IV Dilaudid .2mg q6hrs PRN for severe pain. Tylenol for mild to moderate pain. Bowel regimen with pain medications as patient endorsing constipation at baseline  - No significant stool burden seen on CT A/P

## 2017-11-02 NOTE — ED ADULT TRIAGE NOTE - CHIEF COMPLAINT QUOTE
Pt c/o of abdominal pain for the past two weeks with a poor appetite. Pt denies nausea and vomiting. Pt has PMH of stomach cancer.

## 2017-11-03 ENCOUNTER — TRANSCRIPTION ENCOUNTER (OUTPATIENT)
Age: 72
End: 2017-11-03

## 2017-11-03 DIAGNOSIS — C80.1 MALIGNANT (PRIMARY) NEOPLASM, UNSPECIFIED: ICD-10-CM

## 2017-11-03 DIAGNOSIS — N10 ACUTE PYELONEPHRITIS: ICD-10-CM

## 2017-11-03 DIAGNOSIS — R69 ILLNESS, UNSPECIFIED: ICD-10-CM

## 2017-11-03 DIAGNOSIS — A41.9 SEPSIS, UNSPECIFIED ORGANISM: ICD-10-CM

## 2017-11-03 DIAGNOSIS — N13.5 CROSSING VESSEL AND STRICTURE OF URETER WITHOUT HYDRONEPHROSIS: ICD-10-CM

## 2017-11-03 LAB
BUN SERPL-MCNC: 15 MG/DL — SIGNIFICANT CHANGE UP (ref 7–23)
CALCIUM SERPL-MCNC: 8.1 MG/DL — LOW (ref 8.4–10.5)
CHLORIDE SERPL-SCNC: 102 MMOL/L — SIGNIFICANT CHANGE UP (ref 98–107)
CO2 SERPL-SCNC: 24 MMOL/L — SIGNIFICANT CHANGE UP (ref 22–31)
CREAT SERPL-MCNC: 1.45 MG/DL — HIGH (ref 0.5–1.3)
GLUCOSE SERPL-MCNC: 146 MG/DL — HIGH (ref 70–99)
HCT VFR BLD CALC: 32 % — LOW (ref 39–50)
HGB BLD-MCNC: 10.4 G/DL — LOW (ref 13–17)
MAGNESIUM SERPL-MCNC: 2.1 MG/DL — SIGNIFICANT CHANGE UP (ref 1.6–2.6)
MCHC RBC-ENTMCNC: 29.6 PG — SIGNIFICANT CHANGE UP (ref 27–34)
MCHC RBC-ENTMCNC: 32.5 % — SIGNIFICANT CHANGE UP (ref 32–36)
MCV RBC AUTO: 91.2 FL — SIGNIFICANT CHANGE UP (ref 80–100)
NRBC # FLD: 0 — SIGNIFICANT CHANGE UP
PHOSPHATE SERPL-MCNC: 3.4 MG/DL — SIGNIFICANT CHANGE UP (ref 2.5–4.5)
PLATELET # BLD AUTO: 205 K/UL — SIGNIFICANT CHANGE UP (ref 150–400)
PMV BLD: 10.6 FL — SIGNIFICANT CHANGE UP (ref 7–13)
POTASSIUM SERPL-MCNC: 4.3 MMOL/L — SIGNIFICANT CHANGE UP (ref 3.5–5.3)
POTASSIUM SERPL-SCNC: 4.3 MMOL/L — SIGNIFICANT CHANGE UP (ref 3.5–5.3)
RBC # BLD: 3.51 M/UL — LOW (ref 4.2–5.8)
RBC # FLD: 13.2 % — SIGNIFICANT CHANGE UP (ref 10.3–14.5)
SODIUM SERPL-SCNC: 137 MMOL/L — SIGNIFICANT CHANGE UP (ref 135–145)
SPECIMEN SOURCE: SIGNIFICANT CHANGE UP
SPECIMEN SOURCE: SIGNIFICANT CHANGE UP
WBC # BLD: 4.99 K/UL — SIGNIFICANT CHANGE UP (ref 3.8–10.5)
WBC # FLD AUTO: 4.99 K/UL — SIGNIFICANT CHANGE UP (ref 3.8–10.5)

## 2017-11-03 PROCEDURE — 99233 SBSQ HOSP IP/OBS HIGH 50: CPT | Mod: GC

## 2017-11-03 RX ORDER — MEPERIDINE HYDROCHLORIDE 50 MG/ML
12.5 INJECTION INTRAMUSCULAR; INTRAVENOUS; SUBCUTANEOUS
Qty: 0 | Refills: 0 | Status: DISCONTINUED | OUTPATIENT
Start: 2017-11-03 | End: 2017-11-03

## 2017-11-03 RX ORDER — ONDANSETRON 8 MG/1
4 TABLET, FILM COATED ORAL ONCE
Qty: 0 | Refills: 0 | Status: DISCONTINUED | OUTPATIENT
Start: 2017-11-03 | End: 2017-11-03

## 2017-11-03 RX ORDER — HYDROMORPHONE HYDROCHLORIDE 2 MG/ML
0.5 INJECTION INTRAMUSCULAR; INTRAVENOUS; SUBCUTANEOUS
Qty: 0 | Refills: 0 | Status: DISCONTINUED | OUTPATIENT
Start: 2017-11-03 | End: 2017-11-03

## 2017-11-03 RX ORDER — SIMETHICONE 80 MG/1
80 TABLET, CHEWABLE ORAL EVERY 6 HOURS
Qty: 0 | Refills: 0 | Status: DISCONTINUED | OUTPATIENT
Start: 2017-11-03 | End: 2017-11-17

## 2017-11-03 RX ADMIN — FINASTERIDE 5 MILLIGRAM(S): 5 TABLET, FILM COATED ORAL at 11:35

## 2017-11-03 RX ADMIN — PIPERACILLIN AND TAZOBACTAM 25 GRAM(S): 4; .5 INJECTION, POWDER, LYOPHILIZED, FOR SOLUTION INTRAVENOUS at 05:02

## 2017-11-03 RX ADMIN — Medication 100 MILLIGRAM(S): at 06:22

## 2017-11-03 RX ADMIN — PIPERACILLIN AND TAZOBACTAM 25 GRAM(S): 4; .5 INJECTION, POWDER, LYOPHILIZED, FOR SOLUTION INTRAVENOUS at 13:44

## 2017-11-03 RX ADMIN — SIMETHICONE 80 MILLIGRAM(S): 80 TABLET, CHEWABLE ORAL at 19:15

## 2017-11-03 RX ADMIN — Medication 100 MILLIGRAM(S): at 22:48

## 2017-11-03 RX ADMIN — Medication 100 MILLIGRAM(S): at 13:44

## 2017-11-03 RX ADMIN — POLYETHYLENE GLYCOL 3350 17 GRAM(S): 17 POWDER, FOR SOLUTION ORAL at 11:34

## 2017-11-03 RX ADMIN — SENNA PLUS 2 TABLET(S): 8.6 TABLET ORAL at 22:48

## 2017-11-03 RX ADMIN — TAMSULOSIN HYDROCHLORIDE 0.4 MILLIGRAM(S): 0.4 CAPSULE ORAL at 22:49

## 2017-11-03 RX ADMIN — PIPERACILLIN AND TAZOBACTAM 25 GRAM(S): 4; .5 INJECTION, POWDER, LYOPHILIZED, FOR SOLUTION INTRAVENOUS at 22:49

## 2017-11-03 RX ADMIN — Medication 325 MILLIGRAM(S): at 11:35

## 2017-11-03 RX ADMIN — HEPARIN SODIUM 5000 UNIT(S): 5000 INJECTION INTRAVENOUS; SUBCUTANEOUS at 22:48

## 2017-11-03 RX ADMIN — HEPARIN SODIUM 5000 UNIT(S): 5000 INJECTION INTRAVENOUS; SUBCUTANEOUS at 13:47

## 2017-11-03 RX ADMIN — HEPARIN SODIUM 5000 UNIT(S): 5000 INJECTION INTRAVENOUS; SUBCUTANEOUS at 05:02

## 2017-11-03 NOTE — PROGRESS NOTE ADULT - ASSESSMENT
72M pmhx of pancreatic adenocarcinoma( 2015) s/p whipple/ s/p gemcitabine X 6 cycles, and 1 month of radiation( last tx with gemcitabine in 2016) and xeloda p/w abdominal pain x 2 weeks, found on CT to have likely recurrence of Pancreatic Ca vs. New malignancy with R hydronephrosis requiring ureteral stent

## 2017-11-03 NOTE — PROGRESS NOTE ADULT - PROBLEM SELECTOR PLAN 4
- Patient with epigastric pain radiating to RUQ now improved with morphine 4mg  - Abdominal pain likely 2/2 RP mass concerning for Recurrence of Pancreatic Ca vs. new malignancy?  CEA elevated in June of 6.3  - Will focus on pain control at this time, avoid morphine in setting of HILTON  - Will start IV Dilaudid .5mg q6hrs PRN for severe pain. Tylenol for mild to moderate pain. Bowel regimen with pain medications as patient endorsing constipation at baseline  - Patient required no IV PRN for pain overnight, pain improved this AM  - No significant stool burden seen on CT A/P

## 2017-11-03 NOTE — PROGRESS NOTE ADULT - PROBLEM SELECTOR PLAN 3
- Patient with hydronephrosis of R kidney seen on CT A/P 2/2 RP mass  - Urology placed ureteral stent last night, started Zosyn for pyelonephritis 2/2 RP mass causing the obstruction  - Enlarged prostate seen on CT A/P

## 2017-11-03 NOTE — PROGRESS NOTE ADULT - SUBJECTIVE AND OBJECTIVE BOX
Patient is a 72y old  Male who presents with a chief complaint of Came for complaints of Epigastric Pain radiating from the right side Abdomen to the left side, no nausea or vomiting (2017 18:32)      SUBJECTIVE / OVERNIGHT EVENTS:  No acute events overnight. Patient seen and examined in AM. Patient denied fevers, CP, SOB, lower extremity pain.     MEDICATIONS  (STANDING):  docusate sodium 100 milliGRAM(s) Oral three times a day  ferrous    sulfate 325 milliGRAM(s) Oral daily  finasteride 5 milliGRAM(s) Oral daily  heparin  Injectable 5000 Unit(s) SubCutaneous every 8 hours  lactated ringers. 1000 milliLiter(s) (125 mL/Hr) IV Continuous <Continuous>  piperacillin/tazobactam IVPB. 3.375 Gram(s) IV Intermittent every 8 hours  polyethylene glycol 3350 17 Gram(s) Oral daily  senna 2 Tablet(s) Oral at bedtime  tamsulosin 0.4 milliGRAM(s) Oral at bedtime    MEDICATIONS  (PRN):  acetaminophen   Tablet 650 milliGRAM(s) Oral every 6 hours PRN For Temp greater than 38 C (100.4 F)  acetaminophen   Tablet. 650 milliGRAM(s) Oral every 6 hours PRN Mild to moderate pain 1-5  HYDROmorphone  Injectable 0.5 milliGRAM(s) IV Push every 6 hours PRN Severe pain 6-10        CAPILLARY BLOOD GLUCOSE        I&O's Summary    2017 07:01  -  2017 07:00  --------------------------------------------------------  IN: 850 mL / OUT: 1575 mL / NET: -725 mL      PHYSICAL EXAM:  Vital Signs Last 24 Hrs  T(C): 36.7 (17 @ 04:56)  T(F): 98 (17 @ 04:56), Max: 102 (17 @ 20:22)  HR: 64 (17 @ 04:56) (61 - 92)  BP: 124/86 (17 @ 04:56)  BP(mean): --  RR: 18 (11-03-17 @ 04:56) (12 - 22)  SpO2: 99% (17 @ 04:56) (96% - 100%)  Wt(kg): --     @ 07:01  -   @ 07:00  --------------------------------------------------------  IN: 850 mL / OUT: 1575 mL / NET: -725 mL      Constitutional: NAD, awake and alert  EYES: EOMI  ENT:  Normal Hearing, no tonsillar exudates   Neck: Soft and supple, No JVD  Respiratory: Breath sounds are clear bilaterally, No wheezing, rales or rhonchi  Cardiovascular: S1 and S2, regular rate and rhythm, no Murmurs, gallops or rubs  Gastrointestinal: Well healed midline scar across the abdomen. Bowel Sounds present, soft, tender to deep palpation of the epigastrium, nondistended, no guarding, no rebound  Extremities: No cyanosis or clubbing; warm to touch  Vascular: 2+ peripheral pulses lower ex  Neurological: A/O x 3, no focal deficits  Musculoskeletal: 5/5 strength b/l upper and lower extremities  Skin: No rashes, warm & dry  Psych: no depression or anhedonia  HEME: no bruises, no nose bleeds      LABS:                        10.4   4.99  )-----------( 205      ( 2017 05:30 )             32.0     -    137  |  102  |  15  ----------------------------<  146<H>  4.3   |  24  |  1.45<H>    Ca    8.1<L>      2017 05:30  Phos  3.4     11-  Mg     2.1     -    TPro  7.1  /  Alb  4.0  /  TBili  0.5  /  DBili  x   /  AST  20  /  ALT  37  /  AlkPhos  243<H>  1102    PT/INR - ( 2017 10:50 )   PT: 12.0 SEC;   INR: 1.07          PTT - ( 2017 10:50 )  PTT:32.2 SEC      Urinalysis Basic - ( 2017 19:50 )    Color: COLORL / Appearance: CLEAR / S.009 / pH: 7.0  Gluc: 300 / Ketone: NEGATIVE  / Bili: NEGATIVE / Urobili: NORMAL E.U.   Blood: NEGATIVE / Protein: NEGATIVE / Nitrite: NEGATIVE   Leuk Esterase: NEGATIVE / RBC: x / WBC x   Sq Epi: x / Non Sq Epi: x / Bacteria: x            RADIOLOGY & ADDITIONAL TESTS:    Imaging Personally Reviewed:    Consultant(s) Notes Reviewed:      Care Discussed with Consultants/Other Providers: Patient is a 72y old  Male who presents with a chief complaint of Came for complaints of Epigastric Pain radiating from the right side Abdomen to the left side, no nausea or vomiting (2017 18:32)      SUBJECTIVE / OVERNIGHT EVENTS:  No acute events overnight. Patient seen and examined in AM. Patient denied fevers, CP, SOB, lower extremity pain. Mild abdominal pain Patient afebrile overnight after spiking 101.8 fever at 7PM, pan-cultured, started on Zosyn and Urology placed a ureteral stent/ placed IVF bryson    MEDICATIONS  (STANDING):  docusate sodium 100 milliGRAM(s) Oral three times a day  ferrous    sulfate 325 milliGRAM(s) Oral daily  finasteride 5 milliGRAM(s) Oral daily  heparin  Injectable 5000 Unit(s) SubCutaneous every 8 hours  lactated ringers. 1000 milliLiter(s) (125 mL/Hr) IV Continuous <Continuous>  piperacillin/tazobactam IVPB. 3.375 Gram(s) IV Intermittent every 8 hours  polyethylene glycol 3350 17 Gram(s) Oral daily  senna 2 Tablet(s) Oral at bedtime  tamsulosin 0.4 milliGRAM(s) Oral at bedtime    MEDICATIONS  (PRN):  acetaminophen   Tablet 650 milliGRAM(s) Oral every 6 hours PRN For Temp greater than 38 C (100.4 F)  acetaminophen   Tablet. 650 milliGRAM(s) Oral every 6 hours PRN Mild to moderate pain 1-5  HYDROmorphone  Injectable 0.5 milliGRAM(s) IV Push every 6 hours PRN Severe pain 6-10        CAPILLARY BLOOD GLUCOSE        I&O's Summary    2017 07:01  -  2017 07:00  --------------------------------------------------------  IN: 850 mL / OUT: 1575 mL / NET: -725 mL      PHYSICAL EXAM:  Vital Signs Last 24 Hrs  T(C): 36.7 (17 @ 04:56)  T(F): 98 (17 @ 04:56), Max: 102 (17 @ 20:22)  HR: 64 (17 @ 04:56) (61 - 92)  BP: 124/86 (17 @ 04:56)  BP(mean): --  RR: 18 (17 @ 04:56) (12 - 22)  SpO2: 99% (17 @ 04:56) (96% - 100%)  Wt(kg): --     @ 07:01  -   @ 07:00  --------------------------------------------------------  IN: 850 mL / OUT: 1575 mL / NET: -725 mL      Constitutional: NAD, awake and alert  EYES: EOMI  ENT:  Normal Hearing, no tonsillar exudates   Neck: Soft and supple, No JVD  Respiratory: Breath sounds are clear bilaterally, No wheezing, rales or rhonchi  Cardiovascular: S1 and S2, regular rate and rhythm, no Murmurs, gallops or rubs  Gastrointestinal: Well healed midline scar across the abdomen. Bowel Sounds present, soft, tender to deep palpation of the epigastrium, nondistended, no guarding, no rebound. R CVA tenderness  Extremities: No cyanosis or clubbing; warm to touch  Vascular: 2+ peripheral pulses lower ex  Neurological: A/O x 3, no focal deficits  Musculoskeletal: 5/5 strength b/l upper and lower extremities  Skin: No rashes, warm & dry  Psych: no depression or anhedonia  HEME: no bruises, no nose bleeds      LABS:                        10.4   4.99  )-----------( 205      ( 2017 05:30 )             32.0         137  |  102  |  15  ----------------------------<  146<H>  4.3   |  24  |  1.45<H>    Ca    8.1<L>      2017 05:30  Phos  3.4       Mg     2.1         TPro  7.1  /  Alb  4.0  /  TBili  0.5  /  DBili  x   /  AST  20  /  ALT  37  /  AlkPhos  243<H>      PT/INR - ( 2017 10:50 )   PT: 12.0 SEC;   INR: 1.07          PTT - ( 2017 10:50 )  PTT:32.2 SEC      Urinalysis Basic - ( 2017 19:50 )    Color: COLORL / Appearance: CLEAR / S.009 / pH: 7.0  Gluc: 300 / Ketone: NEGATIVE  / Bili: NEGATIVE / Urobili: NORMAL E.U.   Blood: NEGATIVE / Protein: NEGATIVE / Nitrite: NEGATIVE   Leuk Esterase: NEGATIVE / RBC: x / WBC x   Sq Epi: x / Non Sq Epi: x / Bacteria: x            RADIOLOGY & ADDITIONAL TESTS:    Imaging Personally Reviewed:    Consultant(s) Notes Reviewed:      Care Discussed with Consultants/Other Providers:

## 2017-11-03 NOTE — PROGRESS NOTE ADULT - PROBLEM SELECTOR PLAN 1
- Patient spiked a fever yesterday 101.8 with R hydronephrosis 2/2 RP mass, UA clean  - UCX & BCX in the lab. Started on Zosyn  - Urology placed Ureteral stent for decompression, bryson for 24hrs afebrile then can D/C  - Will need stent exchange in 3 months  - F/u urology recs

## 2017-11-03 NOTE — PROGRESS NOTE ADULT - ATTENDING COMMENTS
Pt seen and examined, chart and labs reviewed.  Overnight events reviewed, pt spiked fever, necessitating urgent ureteral stent placement.  Agree with resident lexy as above. Pt has been afebrile for past 12 hours, currently on IV Zosyn, awaiting Ucx and Bcx.  Pt will require tissue biopsy of RP mass, will consult IR for biopsy on Monday.

## 2017-11-03 NOTE — PROGRESS NOTE ADULT - PROBLEM SELECTOR PLAN 7
- Patient with enlarged prostate on CT A/P  - Will c/w flomax & Tamsulosin  - Ludwig in place, good urine output. -725mL overnight

## 2017-11-03 NOTE — PROGRESS NOTE ADULT - PROBLEM SELECTOR PLAN 5
- RP mass visualized on CT A/P with R hydronephrosis along with  multiple arterially enhancing lesions at the hepatic dome concerning for metastatic disease?  - F/u Heme/onc recs  - Will likely need tissue biopsy, will discuss if need to be done inpatient   - Pain control for now adequate.

## 2017-11-03 NOTE — PROGRESS NOTE ADULT - PROBLEM SELECTOR PLAN 2
- Plan as above. Patient meeting Sepsis criteria with Fever, Tachycardia. Likely source is pyelonephritis 2/2 RP mass causing R hydronephrosis - Plan as above. Patient meeting Sepsis criteria with Fever, Tachycardia last night. Likely source is pyelonephritis 2/2 RP mass causing R hydronephrosis  - Currently afebrile

## 2017-11-03 NOTE — CONSULT NOTE ADULT - SUBJECTIVE AND OBJECTIVE BOX
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 Found to have progressing retroperitoneal mass causing R ureteral compression s/p cystoscopy and R ureteral stenting . GI consulted to evaluate for EUS guided biopsy of retroperitoneal mass. Clinically, pt currently afebrile, comfortable appearing with mild epigastric tenderness. No nausea/vomiting. tolerating PO.     Allergies:  No Known Allergies      Home Medications:    Hospital Medications:  acetaminophen   Tablet 650 milliGRAM(s) Oral every 6 hours PRN  acetaminophen   Tablet. 650 milliGRAM(s) Oral every 6 hours PRN  docusate sodium 100 milliGRAM(s) Oral three times a day  ferrous    sulfate 325 milliGRAM(s) Oral daily  finasteride 5 milliGRAM(s) Oral daily  heparin  Injectable 5000 Unit(s) SubCutaneous every 8 hours  HYDROmorphone  Injectable 0.5 milliGRAM(s) IV Push every 6 hours PRN  piperacillin/tazobactam IVPB. 3.375 Gram(s) IV Intermittent every 8 hours  polyethylene glycol 3350 17 Gram(s) Oral daily  senna 2 Tablet(s) Oral at bedtime  tamsulosin 0.4 milliGRAM(s) Oral at bedtime      PMHX/PSHX:  Adenocarcinoma  No pertinent past medical history  Pancreatic carcinoma  Whipple disease  Jejunostomy tube in situ  No significant past surgical history      Family history:  Family history of heart disease (Father)  No pertinent family history in first degree relatives      Social History:     ROS: as per HPI       PHYSICAL EXAM:   Vital Signs:  Vital Signs Last 24 Hrs  T(C): 36.7 (2017 14:35), Max: 38.9 (2017 20:22)  T(F): 98.1 (2017 14:35), Max: 102 (2017 20:22)  HR: 71 (2017 14:35) (64 - 92)  BP: 133/94 (2017 14:35) (115/76 - 141/86)  BP(mean): --  RR: 18 (2017 14:35) (12 - 22)  SpO2: 100% (2017 14:35) (96% - 100%)  Daily Height in cm: 167.64 (2017 18:55)    Daily     GENERAL:  NAD  HEENT:  sclera -anicteric  CHEST:  breath sounds clear  HEART:  Regular rhythm  ABDOMEN:  Soft, mild epigastric discomfort, non-distended, normoactive bowel sounds  SKIN:  No rash or jaundice   NEURO:  Alert, oriented    LABS:                        10.4   4.99  )-----------( 205      ( 2017 05:30 )             32.0     11-03    137  |  102  |  15  ----------------------------<  146<H>  4.3   |  24  |  1.45<H>    Ca    8.1<L>      2017 05:30  Phos  3.4     11-  Mg     2.1     -    TPro  7.1  /  Alb  4.0  /  TBili  0.5  /  DBili  x   /  AST  20  /  ALT  37  /  AlkPhos  243<H>  11-02    LIVER FUNCTIONS - ( 2017 10:50 )  Alb: 4.0 g/dL / Pro: 7.1 g/dL / ALK PHOS: 243 u/L / ALT: 37 u/L / AST: 20 u/L / GGT: x           PT/INR - ( 2017 10:50 )   PT: 12.0 SEC;   INR: 1.07          PTT - ( 2017 10:50 )  PTT:32.2 SEC  Urinalysis Basic - ( 2017 19:50 )    Color: COLORL / Appearance: CLEAR / S.009 / pH: 7.0  Gluc: 300 / Ketone: NEGATIVE  / Bili: NEGATIVE / Urobili: NORMAL E.U.   Blood: NEGATIVE / Protein: NEGATIVE / Nitrite: NEGATIVE   Leuk Esterase: NEGATIVE / RBC: x / WBC x   Sq Epi: x / Non Sq Epi: x / Bacteria: x          Imaging:        EXAM:  CT ABDOMEN AND PELVIS OC IC        PROCEDURE DATE:  2017         INTERPRETATION:  CLINICAL INFORMATION: Abdominal pain. Periampullary   cancer, recurrent. Status post Whipple procedure.    COMPARISON: PET/CT 2017, MRI 2017.    PROCEDURE:   CT of the Abdomen and Pelvis was performed with intravenous contrast.   Intravenous contrast: 90 ml Omnipaque 350. 10 ml discarded.  Oral contrast: positive contrast was administered.  Sagittal and coronal reformats were performed.    FINDINGS:    LOWER CHEST: The visualized lung bases are clear.        LIVER: There is suggestion of multiple arterially enhancing lesions at   the dome. Scattered cysts.  BILE DUCTS: Normal caliber.  GALLBLADDER: Surgically absent.  SPLEEN: Unremarkable.  PANCREAS: Post Whipple changes. Pancreas is otherwise unchanged in   appearance since prior MRI.  ADRENALS: Unremarkable.  KIDNEYS/URETERS: Multiple bilateral cysts, including a dominant   hemorrhagic cyst in the upper pole of left kidney, better characterized   on prior MRI. No hydronephrosis on the left. There is progression of   moderate right hydronephrosis secondary to the retroperitoneal mass,   located between the duodenum and IVC, which has progressed since the   PET/CT, now measuring 2.1 x 2 cm.      BLADDER: Within normal limits.  REPRODUCTIVE ORGANS: The prostate is enlarged.    BOWEL: Normal in course and caliber without obstruction. Appendix is   normal. Moderate amount of stool. A gastrojejunostomy is again noted.  PERITONEUM/RETROPERITONEUM: No pneumoperitoneum, ascites.  VESSELS: A 3.5 x 3.3 cm infrarenal aortic aneurysm. Mild vascular   calcifications.  BONES/SOFT TISSUES: Degenerative changes of the visualized axial spine.    IMPRESSION:  Status post Whipple procedure with no significant change in appearance of   the pancreas.  Interval progression of soft tissue recurrence in the retroperitoneum   with progression in moderate right hydronephrosis.  Suggestion of multiple arterially enhancing lesions at the hepatic dome.   This can be better evaluated with MRI on outpatient basis.        ARLIN MELO M.D. ATTENDING RADIOLOGIST  This document has been electronically signed. 2017  2:14PM

## 2017-11-03 NOTE — CONSULT NOTE ADULT - ATTENDING COMMENTS
Pt seen and examined. Agree with fellow's note. Plan discussed with patient and primary team, and daughter.     Patient had chief complaint of retroperitoneal mass.     Would pursue IR guided biopsy.

## 2017-11-03 NOTE — CONSULT NOTE ADULT - ASSESSMENT
IMP    Hx of sushil-ampullary adenocarcinoma s/p Whipple 2015 s/p chemo/RT p/w abdominal pain. Found to have progressing retroperitoneal mass causing R ureteral compression s/p cystoscopy and R ureteral stenting 11/2. GI consulted to evaluate for EUS guided biopsy of retroperitoneal mass (concerning for recurrent disease).     PLAN:  - imaging reviewed with radiology - retroperitoneal mass likely more technically accessible by IR -CT guided biopsy given Whipple anatomy.   - if IR unable to obtain biopsy, will consider EUS guided biopsy.

## 2017-11-03 NOTE — CONSULT NOTE ADULT - SUBJECTIVE AND OBJECTIVE BOX
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 1000 milliLiter(s) (125 mL/Hr) IV Continuous <Continuous>  piperacillin/tazobactam IVPB. 3.375 Gram(s) IV Intermittent every 8 hours  polyethylene glycol 3350 17 Gram(s) Oral daily  senna 2 Tablet(s) Oral at bedtime  tamsulosin 0.4 milliGRAM(s) Oral at bedtime    MEDICATIONS  (PRN):  acetaminophen   Tablet 650 milliGRAM(s) Oral every 6 hours PRN For Temp greater than 38 C (100.4 F)  acetaminophen   Tablet. 650 milliGRAM(s) Oral every 6 hours PRN Mild to moderate pain 1-5  HYDROmorphone  Injectable 0.5 milliGRAM(s) IV Push every 6 hours PRN Severe pain 6-10      PAST MEDICAL & SURGICAL HISTORY:  Adenocarcinoma: Periampulla   Hepatobiliary type  Pancreatic carcinoma: s/p whipple in 2015  Jejunostomy tube in situ      FAMILY HISTORY:  Family history of heart disease (Father)      SOCIAL HISTORY: No EtOH, no tobacco    REVIEW OF SYSTEMS:        Height (cm): 167.64 (11-02 @ 18:55)  Weight (kg): 66.3 (11-02 @ 21:19)  BMI (kg/m2): 23.6 (11-02 @ 21:19)  BSA (m2): 1.75 (11-02 @ 21:19)    T(F): 98 (11-03-17 @ 04:56), Max: 102 (11-02-17 @ 20:22)  HR: 64 (11-03-17 @ 04:56)  BP: 124/86 (11-03-17 @ 04:56)  RR: 18 (11-03-17 @ 04:56)  SpO2: 99% (11-03-17 @ 04:56)  Wt(kg): --                              10.4   4.99  )-----------( 205      ( 03 Nov 2017 05:30 )             32.0       11-03    137  |  102  |  15  ----------------------------<  146<H>  4.3   |  24  |  1.45<H>    Ca    8.1<L>      03 Nov 2017 05:30  Phos  3.4     11-03  Mg     2.1     11-03    TPro  7.1  /  Alb  4.0  /  TBili  0.5  /  DBili  x   /  AST  20  /  ALT  37  /  AlkPhos  243<H>  11-02      Phosphorus Level, Serum: 3.4 mg/dL (11-03 @ 05:30)  Magnesium, Serum: 2.1 mg/dL (11-03 @ 05:30) HPI:  72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  Spoke with patient and daughter at bedside.  Report his missed his last couple follow up appointments with Dr. Jain as [place].  States he began having cramp abdominal pain, worsening over past two weeks, epigastric.  Denies nausea, vomitting, diarrhea.  +constipation.    Allergies    No Known Allergies    Intolerances        MEDICATIONS  (STANDING):  docusate sodium 100 milliGRAM(s) Oral three times a day  ferrous    sulfate 325 milliGRAM(s) Oral daily  finasteride 5 milliGRAM(s) Oral daily  heparin  Injectable 5000 Unit(s) SubCutaneous every 8 hours  lactated ringers. 1000 milliLiter(s) (125 mL/Hr) IV Continuous <Continuous>  piperacillin/tazobactam IVPB. 3.375 Gram(s) IV Intermittent every 8 hours  polyethylene glycol 3350 17 Gram(s) Oral daily  senna 2 Tablet(s) Oral at bedtime  tamsulosin 0.4 milliGRAM(s) Oral at bedtime    MEDICATIONS  (PRN):  acetaminophen   Tablet 650 milliGRAM(s) Oral every 6 hours PRN For Temp greater than 38 C (100.4 F)  acetaminophen   Tablet. 650 milliGRAM(s) Oral every 6 hours PRN Mild to moderate pain 1-5  HYDROmorphone  Injectable 0.5 milliGRAM(s) IV Push every 6 hours PRN Severe pain 6-10      PAST MEDICAL & SURGICAL HISTORY:  Adenocarcinoma: Periampulla   Hepatobiliary type  Pancreatic carcinoma: s/p whipple in 2015  Jejunostomy tube in situ      FAMILY HISTORY:  Family history of heart disease (Father)      SOCIAL HISTORY: No EtOH, no tobacco    REVIEW OF SYSTEMS:    CONSTITUTIONAL: No weakness, fevers or chills  EYES/ENT: No visual changes;  No vertigo or throat pain   NECK: No pain or stiffness  RESPIRATORY: No cough, wheezing, hemoptysis; No shortness of breath  CARDIOVASCULAR: No chest pain or palpitations  GASTROINTESTINAL: No abdominal or epigastric pain. No nausea, vomiting, or hematemesis; No diarrhea or constipation. No melena or hematochezia.  GENITOURINARY: No dysuria, frequency or hematuria  NEUROLOGICAL: No numbness or weakness  SKIN: No itching, burning, rashes, or lesions   All other review of systems is negative unless indicated above.      Height (cm): 167.64 (11-02 @ 18:55)  Weight (kg): 66.3 (11-02 @ 21:19)  BMI (kg/m2): 23.6 (11-02 @ 21:19)  BSA (m2): 1.75 (11-02 @ 21:19)    T(F): 98 (11-03-17 @ 04:56), Max: 102 (11-02-17 @ 20:22)  HR: 64 (11-03-17 @ 04:56)  BP: 124/86 (11-03-17 @ 04:56)  RR: 18 (11-03-17 @ 04:56)  SpO2: 99% (11-03-17 @ 04:56)  Wt(kg): --    GENERAL: NAD, well-developed  HEAD:  Atraumatic, Normocephalic  EYES: EOMI, PERRLA, conjunctiva and sclera clear  NECK: Supple, No JVD  CHEST/LUNG: Clear to auscultation bilaterally; No wheeze  HEART: Regular rate and rhythm; No murmurs, rubs, or gallops  ABDOMEN: Soft, Right sided tenderness, no guarding/rebound  EXTREMITIES:  2+ Peripheral Pulses, No clubbing, cyanosis, or edema  NEUROLOGY: non-focal                            10.4   4.99  )-----------( 205      ( 03 Nov 2017 05:30 )             32.0       11-03    137  |  102  |  15  ----------------------------<  146<H>  4.3   |  24  |  1.45<H>    Ca    8.1<L>      03 Nov 2017 05:30  Phos  3.4     11-03  Mg     2.1     11-03    TPro  7.1  /  Alb  4.0  /  TBili  0.5  /  DBili  x   /  AST  20  /  ALT  37  /  AlkPhos  243<H>  11-02      Phosphorus Level, Serum: 3.4 mg/dL (11-03 @ 05:30)  Magnesium, Serum: 2.1 mg/dL (11-03 @ 05:30)    < from: CT Abdomen and Pelvis w/ Oral Cont and w/ IV Cont (11.02.17 @ 13:10) >    EXAM:  CT ABDOMEN AND PELVIS OC IC        PROCEDURE DATE:  Nov 2 2017         INTERPRETATION:  CLINICAL INFORMATION: Abdominal pain. Periampullary   cancer, recurrent. Status post Whipple procedure.    COMPARISON: PET/CT 8/7/2017, MRI 7/21/2017.    PROCEDURE:   CT of the Abdomen and Pelvis was performed with intravenous contrast.   Intravenous contrast: 90 ml Omnipaque 350. 10 ml discarded.  Oral contrast: positive contrast was administered.  Sagittal and coronal reformats were performed.    FINDINGS:    LOWER CHEST: The visualized lung bases are clear.        LIVER: There is suggestion of multiple arterially enhancing lesions at   the dome. Scattered cysts.  BILE DUCTS: Normal caliber.  GALLBLADDER: Surgically absent.  SPLEEN: Unremarkable.  PANCREAS: Post Whipple changes. Pancreas is otherwise unchanged in   appearance since prior MRI.  ADRENALS: Unremarkable.  KIDNEYS/URETERS: Multiple bilateral cysts, including a dominant   hemorrhagic cyst in the upper pole of left kidney, better characterized   on prior MRI. No hydronephrosis on the left. There is progression of   moderate right hydronephrosis secondary to the retroperitoneal mass,   located between the duodenum and IVC, which has progressed since the   PET/CT, now measuring 2.1 x 2 cm.      BLADDER: Within normal limits.  REPRODUCTIVE ORGANS: The prostate is enlarged.    BOWEL: Normal in course and caliber without obstruction. Appendix is   normal. Moderate amount of stool. A gastrojejunostomy is again noted.  PERITONEUM/RETROPERITONEUM: No pneumoperitoneum, ascites.  VESSELS: A 3.5 x 3.3 cm infrarenal aortic aneurysm. Mild vascular   calcifications.  BONES/SOFT TISSUES: Degenerative changes of the visualized axial spine.    IMPRESSION:  Status post Whipple procedurewith no significant change in appearance of   the pancreas.  Interval progression of soft tissue recurrence in the retroperitoneum   with progression in moderate right hydronephrosis.  Suggestion of multiple arterially enhancing lesions at the hepatic dome.   This can be better evaluated with MRI on outpatient basis.    < end of copied text >

## 2017-11-03 NOTE — CONSULT NOTE ADULT - ASSESSMENT
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

## 2017-11-03 NOTE — PROGRESS NOTE ADULT - PROBLEM SELECTOR PLAN 1
-Continue antibiotics  -Follow up urine culture, blood cultures  -Keep Ludwig until afebrile at least 24 hours  -Monitor Is & Os  -Patient will need stent changes q3 months  -Obtain surgical oncology consult  -Follow up surgical/oncologic plan for treatment of RP mass

## 2017-11-03 NOTE — PROGRESS NOTE ADULT - SUBJECTIVE AND OBJECTIVE BOX
POST-OP CHECK    Patient doing well s/p insertion of R ureteral stent.   Afebrile since OR.  Pain controlled.  	  T(F): 98, Max: 102 (11-02-17 @ 20:22)  HR: 64  BP: 124/86  SpO2: 99%    OUT:    Indwelling Catheter - Urethral: 725 mL    Voided: 500 mL  Total OUT: 1225 mL    Gen NAD  Abd Soft, NT, ND   Ludwig in place draining clear yellow urine    11-02 @ 10:50  WBC 4.45  / Hct 36.2  / SCr 1.66

## 2017-11-04 DIAGNOSIS — R19.00 INTRA-ABDOMINAL AND PELVIC SWELLING, MASS AND LUMP, UNSPECIFIED SITE: ICD-10-CM

## 2017-11-04 LAB
BACTERIA UR CULT: SIGNIFICANT CHANGE UP
BUN SERPL-MCNC: 12 MG/DL — SIGNIFICANT CHANGE UP (ref 7–23)
CALCIUM SERPL-MCNC: 8.7 MG/DL — SIGNIFICANT CHANGE UP (ref 8.4–10.5)
CHLORIDE SERPL-SCNC: 99 MMOL/L — SIGNIFICANT CHANGE UP (ref 98–107)
CO2 SERPL-SCNC: 23 MMOL/L — SIGNIFICANT CHANGE UP (ref 22–31)
CREAT SERPL-MCNC: 1.44 MG/DL — HIGH (ref 0.5–1.3)
GLUCOSE SERPL-MCNC: 100 MG/DL — HIGH (ref 70–99)
HCT VFR BLD CALC: 34.2 % — LOW (ref 39–50)
HGB BLD-MCNC: 11.1 G/DL — LOW (ref 13–17)
MAGNESIUM SERPL-MCNC: 2.2 MG/DL — SIGNIFICANT CHANGE UP (ref 1.6–2.6)
MCHC RBC-ENTMCNC: 29.5 PG — SIGNIFICANT CHANGE UP (ref 27–34)
MCHC RBC-ENTMCNC: 32.5 % — SIGNIFICANT CHANGE UP (ref 32–36)
MCV RBC AUTO: 91 FL — SIGNIFICANT CHANGE UP (ref 80–100)
NRBC # FLD: 0 — SIGNIFICANT CHANGE UP
PHOSPHATE SERPL-MCNC: 2.5 MG/DL — SIGNIFICANT CHANGE UP (ref 2.5–4.5)
PLATELET # BLD AUTO: 240 K/UL — SIGNIFICANT CHANGE UP (ref 150–400)
PMV BLD: 10.9 FL — SIGNIFICANT CHANGE UP (ref 7–13)
POTASSIUM SERPL-MCNC: 4.2 MMOL/L — SIGNIFICANT CHANGE UP (ref 3.5–5.3)
POTASSIUM SERPL-SCNC: 4.2 MMOL/L — SIGNIFICANT CHANGE UP (ref 3.5–5.3)
RBC # BLD: 3.76 M/UL — LOW (ref 4.2–5.8)
RBC # FLD: 13.1 % — SIGNIFICANT CHANGE UP (ref 10.3–14.5)
SODIUM SERPL-SCNC: 136 MMOL/L — SIGNIFICANT CHANGE UP (ref 135–145)
SPECIMEN SOURCE: SIGNIFICANT CHANGE UP
WBC # BLD: 5.74 K/UL — SIGNIFICANT CHANGE UP (ref 3.8–10.5)
WBC # FLD AUTO: 5.74 K/UL — SIGNIFICANT CHANGE UP (ref 3.8–10.5)

## 2017-11-04 PROCEDURE — 99233 SBSQ HOSP IP/OBS HIGH 50: CPT | Mod: GC

## 2017-11-04 PROCEDURE — 99222 1ST HOSP IP/OBS MODERATE 55: CPT | Mod: GC

## 2017-11-04 RX ADMIN — PIPERACILLIN AND TAZOBACTAM 25 GRAM(S): 4; .5 INJECTION, POWDER, LYOPHILIZED, FOR SOLUTION INTRAVENOUS at 06:10

## 2017-11-04 RX ADMIN — HEPARIN SODIUM 5000 UNIT(S): 5000 INJECTION INTRAVENOUS; SUBCUTANEOUS at 06:10

## 2017-11-04 RX ADMIN — HYDROMORPHONE HYDROCHLORIDE 0.5 MILLIGRAM(S): 2 INJECTION INTRAMUSCULAR; INTRAVENOUS; SUBCUTANEOUS at 22:34

## 2017-11-04 RX ADMIN — Medication 325 MILLIGRAM(S): at 13:28

## 2017-11-04 RX ADMIN — POLYETHYLENE GLYCOL 3350 17 GRAM(S): 17 POWDER, FOR SOLUTION ORAL at 13:28

## 2017-11-04 RX ADMIN — SENNA PLUS 2 TABLET(S): 8.6 TABLET ORAL at 22:18

## 2017-11-04 RX ADMIN — HYDROMORPHONE HYDROCHLORIDE 0.5 MILLIGRAM(S): 2 INJECTION INTRAMUSCULAR; INTRAVENOUS; SUBCUTANEOUS at 22:19

## 2017-11-04 RX ADMIN — HEPARIN SODIUM 5000 UNIT(S): 5000 INJECTION INTRAVENOUS; SUBCUTANEOUS at 22:24

## 2017-11-04 RX ADMIN — HYDROMORPHONE HYDROCHLORIDE 0.5 MILLIGRAM(S): 2 INJECTION INTRAMUSCULAR; INTRAVENOUS; SUBCUTANEOUS at 16:09

## 2017-11-04 RX ADMIN — HYDROMORPHONE HYDROCHLORIDE 0.5 MILLIGRAM(S): 2 INJECTION INTRAMUSCULAR; INTRAVENOUS; SUBCUTANEOUS at 09:39

## 2017-11-04 RX ADMIN — Medication 100 MILLIGRAM(S): at 06:10

## 2017-11-04 RX ADMIN — HYDROMORPHONE HYDROCHLORIDE 0.5 MILLIGRAM(S): 2 INJECTION INTRAMUSCULAR; INTRAVENOUS; SUBCUTANEOUS at 09:24

## 2017-11-04 RX ADMIN — HYDROMORPHONE HYDROCHLORIDE 0.5 MILLIGRAM(S): 2 INJECTION INTRAMUSCULAR; INTRAVENOUS; SUBCUTANEOUS at 15:54

## 2017-11-04 RX ADMIN — HEPARIN SODIUM 5000 UNIT(S): 5000 INJECTION INTRAVENOUS; SUBCUTANEOUS at 13:28

## 2017-11-04 RX ADMIN — PIPERACILLIN AND TAZOBACTAM 25 GRAM(S): 4; .5 INJECTION, POWDER, LYOPHILIZED, FOR SOLUTION INTRAVENOUS at 13:28

## 2017-11-04 RX ADMIN — FINASTERIDE 5 MILLIGRAM(S): 5 TABLET, FILM COATED ORAL at 13:28

## 2017-11-04 RX ADMIN — PIPERACILLIN AND TAZOBACTAM 25 GRAM(S): 4; .5 INJECTION, POWDER, LYOPHILIZED, FOR SOLUTION INTRAVENOUS at 22:18

## 2017-11-04 RX ADMIN — TAMSULOSIN HYDROCHLORIDE 0.4 MILLIGRAM(S): 0.4 CAPSULE ORAL at 22:19

## 2017-11-04 NOTE — PROGRESS NOTE ADULT - PROBLEM SELECTOR PLAN 1
-Continue antibiotics  -Follow up blood cultures  -D/C bryson   -Monitor Is & Os  -Patient will need stent changes q3 months  -Obtain surgical & medical oncology consult for treatment of RP mass. -Continue antibiotics  -Follow up blood and OR kidney cultures  -D/C bryson, please check post void residual after void  -Monitor Is & Os  -Patient will need stent changes q3 months  -Obtain surgical & medical oncology consult for treatment of RP mass.

## 2017-11-04 NOTE — PROGRESS NOTE ADULT - PROBLEM SELECTOR PLAN 6
- Patient with enlarged prostate on CT A/P  - Will c/w flomax & Tamsulosin  - Ludwig in place, good urine output. -725mL overnight - Patient with enlarged prostate on CT A/P  - cont tamsulosin & finasteride  - patient afebrile > 48 hours; will D/C Ludwig per Urology - RP mass visualized on CT A/P with R hydronephrosis along  concerning for metastatic disease?  - F/u Heme/onc recs  - Will likely need tissue biopsy, will discuss if need to be done inpatient   - Pain control for now adequate

## 2017-11-04 NOTE — PROGRESS NOTE ADULT - SUBJECTIVE AND OBJECTIVE BOX
Patient is a 72y old  Male who presents with a chief complaint of Came for complaints of Epigastric Pain radiating from the right side Abdomen to the left side, no nausea or vomiting (2017 18:32)      SUBJECTIVE / OVERNIGHT EVENTS:    MEDICATIONS  (STANDING):  docusate sodium 100 milliGRAM(s) Oral three times a day  ferrous    sulfate 325 milliGRAM(s) Oral daily  finasteride 5 milliGRAM(s) Oral daily  heparin  Injectable 5000 Unit(s) SubCutaneous every 8 hours  piperacillin/tazobactam IVPB. 3.375 Gram(s) IV Intermittent every 8 hours  polyethylene glycol 3350 17 Gram(s) Oral daily  senna 2 Tablet(s) Oral at bedtime  tamsulosin 0.4 milliGRAM(s) Oral at bedtime    MEDICATIONS  (PRN):  acetaminophen   Tablet 650 milliGRAM(s) Oral every 6 hours PRN For Temp greater than 38 C (100.4 F)  acetaminophen   Tablet. 650 milliGRAM(s) Oral every 6 hours PRN Mild to moderate pain 1-5  HYDROmorphone  Injectable 0.5 milliGRAM(s) IV Push every 6 hours PRN Severe pain 6-10  simethicone 80 milliGRAM(s) Chew every 6 hours PRN Dyspepsia  I&O's Summary    2017 07:01  -  2017 07:00  --------------------------------------------------------  IN: 400 mL / OUT: 5550 mL / NET: -5150 mL    Vital Signs Last 24 Hrs  T(C): 37.5 (2017 05:57), Max: 37.5 (2017 05:57)  T(F): 99.5 (2017 05:57), Max: 99.5 (2017 05:57)  HR: 74 (2017 05:57) (71 - 74)  BP: 136/84 (2017 05:57) (133/94 - 150/87)  BP(mean): --  RR: 18 (2017 05:57) (16 - 18)  SpO2: 100% (2017 05:57) (100% - 100%)    PHYSICAL EXAM:  GENERAL: NAD, well-developed  HEAD:  Atraumatic, Normocephalic  EYES: EOMI, PERRLA, conjunctiva and sclera clear  NECK: Supple, No JVD  CHEST/LUNG: Clear to auscultation bilaterally; No wheeze  HEART: Regular rate and rhythm; No murmurs, rubs, or gallops  ABDOMEN: Soft, Nontender, Nondistended; Bowel sounds present  EXTREMITIES:  2+ Peripheral Pulses, No clubbing, cyanosis, or edema  PSYCH: AAOx3  NEUROLOGY: non-focal  SKIN: No rashes or lesions    LABS:                        11.1   5.74  )-----------( 240      ( 2017 06:00 )             34.2     11-    136  |  99  |  12  ----------------------------<  100<H>  4.2   |  23  |  1.44<H>    Ca    8.7      2017 06:00  Phos  2.5       Mg     2.2         TPro  7.1  /  Alb  4.0  /  TBili  0.5  /  DBili  x   /  AST  20  /  ALT  37  /  AlkPhos  243<H>  11-02    PT/INR - ( 2017 10:50 )   PT: 12.0 SEC;   INR: 1.07          PTT - ( 2017 10:50 )  PTT:32.2 SEC      Urinalysis Basic - ( 2017 19:50 )    Color: COLORL / Appearance: CLEAR / S.009 / pH: 7.0  Gluc: 300 / Ketone: NEGATIVE  / Bili: NEGATIVE / Urobili: NORMAL E.U.   Blood: NEGATIVE / Protein: NEGATIVE / Nitrite: NEGATIVE   Leuk Esterase: NEGATIVE / RBC: x / WBC x   Sq Epi: x / Non Sq Epi: x / Bacteria: x        RADIOLOGY & ADDITIONAL TESTS:    Imaging Personally Reviewed:    Consultant(s) Notes Reviewed:  Urology     Care Discussed with Consultants/Other Providers: Patient is a 72y old  Male who presents with a chief complaint of Came for complaints of Epigastric Pain radiating from the right side Abdomen to the left side, no nausea or vomiting (2017 18:32)      SUBJECTIVE / OVERNIGHT EVENTS:    Patient c/o of epigastric & right-sided abdominal pain. Patient ate small quantity of food overnight w/o associated N/V but overall appetite remains lows. He denies fever, chills, burning w/ urination. No pain due to Ludwig catheter. Patient has not had a bowel movement in 4 days but denies constipation. He is passing gas.      MEDICATIONS  (STANDING):  docusate sodium 100 milliGRAM(s) Oral three times a day  ferrous    sulfate 325 milliGRAM(s) Oral daily  finasteride 5 milliGRAM(s) Oral daily  heparin  Injectable 5000 Unit(s) SubCutaneous every 8 hours  piperacillin/tazobactam IVPB. 3.375 Gram(s) IV Intermittent every 8 hours  polyethylene glycol 3350 17 Gram(s) Oral daily  senna 2 Tablet(s) Oral at bedtime  tamsulosin 0.4 milliGRAM(s) Oral at bedtime    MEDICATIONS  (PRN):  acetaminophen   Tablet 650 milliGRAM(s) Oral every 6 hours PRN For Temp greater than 38 C (100.4 F)  acetaminophen   Tablet. 650 milliGRAM(s) Oral every 6 hours PRN Mild to moderate pain 1-5  HYDROmorphone  Injectable 0.5 milliGRAM(s) IV Push every 6 hours PRN Severe pain 6-10  simethicone 80 milliGRAM(s) Chew every 6 hours PRN Dyspepsia  I&O's Summary    2017 07:01  -  2017 07:00  --------------------------------------------------------  IN: 400 mL / OUT: 5550 mL / NET: -5150 mL    Vital Signs Last 24 Hrs  T(C): 37.5 (2017 05:57), Max: 37.5 (2017 05:57)  T(F): 99.5 (2017 05:57), Max: 99.5 (2017 05:57)  HR: 74 (2017 05:57) (71 - 74)  BP: 136/84 (2017 05:57) (133/94 - 150/87)  BP(mean): --  RR: 18 (2017 05:57) (16 - 18)  SpO2: 100% (2017 05:57) (100% - 100%)    PHYSICAL EXAM:  GENERAL: NAD, well-developed, appears in mild distress due to pain   HEAD:  Atraumatic, Normocephalic  EYES: EOMI, PERRLA, conjunctiva and sclera clear  NECK: Supple, No JVD  CHEST/LUNG: Clear to auscultation bilaterally; No wheeze  HEART: Regular rate and rhythm; No murmurs, rubs, or gallops  GI: Soft, nontender +epigastrium/RUQ/R. flank TTP +bowel sounds mildly diminished over R side +chevron scar well healed  : +Ludwig draining blood-tinged urine  EXTREMITIES:  2+ pedal pulses, no LE edema   PSYCH: AAOx3  NEUROLOGY: non-focal  SKIN: No rashes or lesions    LABS:                        11.1   5.74  )-----------( 240      ( 2017 06:00 )             34.2     11-04    136  |  99  |  12  ----------------------------<  100<H>  4.2   |  23  |  1.44<H>    Ca    8.7      2017 06:00  Phos  2.5     11-  Mg     2.2     11-    TPro  7.1  /  Alb  4.0  /  TBili  0.5  /  DBili  x   /  AST  20  /  ALT  37  /  AlkPhos  243<H>  11-02    PT/INR - ( 2017 10:50 )   PT: 12.0 SEC;   INR: 1.07          PTT - ( 2017 10:50 )  PTT:32.2 SEC      Urinalysis Basic - ( 2017 19:50 )    Color: COLORL / Appearance: CLEAR / S.009 / pH: 7.0  Gluc: 300 / Ketone: NEGATIVE  / Bili: NEGATIVE / Urobili: NORMAL E.U.   Blood: NEGATIVE / Protein: NEGATIVE / Nitrite: NEGATIVE   Leuk Esterase: NEGATIVE / RBC: x / WBC x   Sq Epi: x / Non Sq Epi: x / Bacteria: x        RADIOLOGY & ADDITIONAL TESTS:    Imaging Personally Reviewed:    Consultant(s) Notes Reviewed:  Urology     Care Discussed with Consultants/Other Providers: Patient is a 72y old  Male who presents with a chief complaint of Came for complaints of Epigastric Pain radiating from the right side Abdomen to the left side, no nausea or vomiting (2017 18:32)      SUBJECTIVE / OVERNIGHT EVENTS:    Patient c/o of epigastric & right-sided abdominal pain. Patient ate small quantity of food overnight w/o associated N/V but overall appetite remains lows. He denies fever, chills, burning w/ urination. No pain due to Ludwig catheter. Patient has not had a bowel movement in 4 days but denies constipation. He is passing gas.      MEDICATIONS  (STANDING):  docusate sodium 100 milliGRAM(s) Oral three times a day  ferrous    sulfate 325 milliGRAM(s) Oral daily  finasteride 5 milliGRAM(s) Oral daily  heparin  Injectable 5000 Unit(s) SubCutaneous every 8 hours  piperacillin/tazobactam IVPB. 3.375 Gram(s) IV Intermittent every 8 hours  polyethylene glycol 3350 17 Gram(s) Oral daily  senna 2 Tablet(s) Oral at bedtime  tamsulosin 0.4 milliGRAM(s) Oral at bedtime    MEDICATIONS  (PRN):  acetaminophen   Tablet 650 milliGRAM(s) Oral every 6 hours PRN For Temp greater than 38 C (100.4 F)  acetaminophen   Tablet. 650 milliGRAM(s) Oral every 6 hours PRN Mild to moderate pain 1-5  HYDROmorphone  Injectable 0.5 milliGRAM(s) IV Push every 6 hours PRN Severe pain 6-10  simethicone 80 milliGRAM(s) Chew every 6 hours PRN Dyspepsia  I&O's Summary    2017 07:01  -  2017 07:00  --------------------------------------------------------  IN: 400 mL / OUT: 5550 mL / NET: -5150 mL    Vital Signs Last 24 Hrs  T(C): 37.5 (2017 05:57), Max: 37.5 (2017 05:57)  T(F): 99.5 (2017 05:57), Max: 99.5 (2017 05:57)  HR: 74 (2017 05:57) (71 - 74)  BP: 136/84 (2017 05:57) (133/94 - 150/87)  BP(mean): --  RR: 18 (2017 05:57) (16 - 18)  SpO2: 100% (2017 05:57) (100% - 100%)    PHYSICAL EXAM:  GENERAL: NAD, well-developed, appears in mild distress due to pain   HEAD:  Atraumatic, Normocephalic  EYES: EOMI, PERRLA, conjunctiva and sclera clear  NECK: Supple, No JVD  CHEST/LUNG: Clear to auscultation bilaterally; No wheeze  HEART: Regular rate and rhythm; No murmurs, rubs, or gallops  GI: Soft, nontender +epigastrium/RUQ/R. flank TTP +bowel sounds mildly diminished over R side +chevron scar well healed  : +Ludwig draining blood-tinged urine  EXTREMITIES:  2+ pedal pulses, no LE edema   PSYCH: AAOx3  NEUROLOGY: non-focal  SKIN: No rashes or lesions    LABS:                        11.1   5.74  )-----------( 240      ( 2017 06:00 )             34.2     11-04    136  |  99  |  12  ----------------------------<  100<H>  4.2   |  23  |  1.44<H>    Ca    8.7      2017 06:00  Phos  2.5     11-  Mg     2.2     11-    TPro  7.1  /  Alb  4.0  /  TBili  0.5  /  DBili  x   /  AST  20  /  ALT  37  /  AlkPhos  243<H>  11-02    PT/INR - ( 2017 10:50 )   PT: 12.0 SEC;   INR: 1.07          PTT - ( 2017 10:50 )  PTT:32.2 SEC      Urinalysis Basic - ( 2017 19:50 )    Color: COLORL / Appearance: CLEAR / S.009 / pH: 7.0  Gluc: 300 / Ketone: NEGATIVE  / Bili: NEGATIVE / Urobili: NORMAL E.U.   Blood: NEGATIVE / Protein: NEGATIVE / Nitrite: NEGATIVE   Leuk Esterase: NEGATIVE / RBC: x / WBC x   Sq Epi: x / Non Sq Epi: x / Bacteria: x        RADIOLOGY & ADDITIONAL TESTS:    Imaging Personally Reviewed:    Consultant(s) Notes Reviewed:  Urology, Gastroenterology     Care Discussed with Consultants/Other Providers:

## 2017-11-04 NOTE — PROGRESS NOTE ADULT - PROBLEM SELECTOR PLAN 1
- Patient spiked a fever yesterday 101.8 with R hydronephrosis 2/2 RP mass, UA clean  - UCX & BCX in the lab. Started on Zosyn  - Urology placed Ureteral stent for decompression, bryson for 24hrs afebrile then can D/C  - Will need stent exchange in 3 months  - F/u urology recs - febrile to 101.8 on 11/2; now afebrile for 48 hours with urine cx. & blood cx. NGTD for 48 hours on Zosyn  - possibly secondary to ureteral compression in setting of new right RP mass  now s/p stent  - cont Zosyn q8; length of therapy TBD  - will d/c Ludwig given afebrile > 24hrs; Urology recs appreciated  - outpatient Urology follow up for stent exchange in 3 months

## 2017-11-04 NOTE — PROGRESS NOTE ADULT - ATTENDING COMMENTS
Pt seen and examined, chart and labs reviewed.  Pt had severe pain in RUQ/epigastric region, was not aware that he could ask for pain medications.  Now pain free after receiving IV Dilaudid.  Pt and daughter are in agreement with plan.  IR already evaluated images and recommended GI EUS guided biopsy as the RP mass is technically challenging to access.  GI to be informed.  Pt urinating well, Cr improving s/p stent placement likely from postobstructive uropathy compression from RP mass.

## 2017-11-04 NOTE — PROGRESS NOTE ADULT - SUBJECTIVE AND OBJECTIVE BOX
Progress Note    Subjective  Afebrile x 24 hours. Urine and blood cultures negative to date.   On Zosyn.     Objective  Afebrile, HR: 74, BP: 136/84, SpO2: 100% (RA)   Ludwig: 2450 cc    Gen:  Abd:  :    Diet/Fluids  Regular, IV lock      Labs 11/4  WBC:  5.74 (from 4.99)  HCT: 34.2 (from 34)    Cr: pending (from 1.45 <-- 1.66)      Cultures:  Urine Cx: negative  Blood Cx: prelim negative    Antibiotics: Zosyn  Progress Note    Subjective  Afebrile x 24 hours. Urine and blood cultures negative to date.   On Zosyn. Removed bryson in AM. Tolerating regular diet.       Objective  Afebrile, HR: 74, BP: 136/84, SpO2: 100% (RA)   Bryson: 2450 cc    Gen: NAD  Abd: soft, NT/ND  : bryson with clear yellow/pink output     Diet/Fluids  Regular, IV lock      Labs 11/4  WBC:  5.74 (from 4.99)  HCT: 34.2 (from 34)    Cr: pending (from 1.45 <-- 1.66)      Cultures:  Urine Cx: negative  Blood Cx: prelim negative    Antibiotics: Zosyn

## 2017-11-04 NOTE — PROGRESS NOTE ADULT - PROBLEM SELECTOR PLAN 4
- RP mass visualized on CT A/P with R hydronephrosis along with  multiple arterially enhancing lesions at the hepatic dome concerning for metastatic disease?  - F/u Heme/onc recs  - Will likely need tissue biopsy, will discuss if need to be done inpatient   - Pain control for now adequate. - RP mass visualized on CT A/P with R hydronephrosis along with  multiple arterially enhancing lesions at the hepatic dome concerning for metastatic disease?  - F/u Heme/onc recs  - Will likely need tissue biopsy, will discuss if need to be done inpatient   - Pain control for now adequate - right RP mass with multiple arterially enhancing lesions at the hepatic dome found on CT A/P concerning for recurrence of pancreatic CA vs. new malignancy?   - CEA noted to be elevated to 6.3 in June  - will obtain tissue pathology per Heme/Onc recommendations - IR consulted however biopsy anticipated to technically challenging due to location   - will re-consult GI for EUS-guided biopsy who initially deferred to IR

## 2017-11-04 NOTE — PROGRESS NOTE ADULT - PROBLEM SELECTOR PLAN 5
- Likely multifactorial 2/2 RP mass causing hydronephrosis vs. BPH vs. Pre-renal 2/2 poor po intake/ sepsis  - Cr improved today to 1.45  -On  cc/hr. Will stop after 12 hrs, c/w bryson for at least 24hrs  - Monitor In/out  - Monitor BMP QD  - Avoid nephrotoxic agents - likely multifactorial 2/2 RP mass causing hydronephrosis vs. BPH vs. pre-renal etiology in setting of poor PO intake/sepsis  - off IV fluids, urine output acceptable - complicated by epigastric pain radiating to RUQ - not optimally controlled at this time as patient not aware of PRN Dilaudid order  - patient now aware of PRN pain medications; will monitor pain response  - Tylenol PRN for mild to moderate pain  - Dilaudid 0.5 IV q6 PRN for severe pain   - no bowel movement for 4 days without significant stool burden seen on CT A/P; cont bowel regimen and monitor no

## 2017-11-04 NOTE — PROGRESS NOTE ADULT - PROBLEM SELECTOR PLAN 8
HSQ - Patient with enlarged prostate on CT A/P  - cont tamsulosin & finasteride  - patient afebrile > 48 hours; will D/C Ludwig per Urology

## 2017-11-04 NOTE — PROGRESS NOTE ADULT - ASSESSMENT
72M with pancreatic adenocarcinoma (dx. 2015) s/p whipple s/p gemcitabine (x 6 cycles) & RT (completed 2016) , s/p Xeloda presents for 2-week history of abdominal pain found to have right RP mass on CT likely recurrence of pancreatic CA vs. new malignancy c/b R. ureteral compression and hydronephrosis requiring ureteral stent 72M with pancreatic adenocarcinoma (dx. 2015) s/p whipple s/p gemcitabine (x 6 cycles) & RT (completed 2016) , s/p Xeloda presents for 2-week history of abdominal pain found to have right RP mass on CT likely recurrence of pancreatic CA vs. new malignancy c/b R. ureteral compression and hydronephrosis requiring ureteral stent.

## 2017-11-04 NOTE — PROGRESS NOTE ADULT - PROBLEM SELECTOR PLAN 3
- Patient with hydronephrosis of R kidney seen on CT A/P 2/2 RP mass  - Urology placed ureteral stent last night, started Zosyn for pyelonephritis 2/2 RP mass causing the obstruction  - Enlarged prostate seen on CT A/P - Patient with hydronephrosis of R kidney seen on CT A/P 2/2 right RP mass  - s/p ureteral stent placement with Ludwig on 11/2 by Urology  - cont tamsulosin & finasteride for likely BPH given enlarged prostate on CT A/P  - DC Ludwig since afebrile > 24 hours per Urology  - cont monitor UOP

## 2017-11-04 NOTE — PROGRESS NOTE ADULT - PROBLEM SELECTOR PLAN 2
- Plan as above. Patient meeting Sepsis criteria with Fever, Tachycardia last night. Likely source is pyelonephritis 2/2 RP mass causing R hydronephrosis  - Currently afebrile - sepsis criteria due to fever T 101.8 with tachycardia on 11/2 - NOW RESOLVED  - likely pyelonephritis in setting of RP mass causing R ureteral compression and hydronephrosis  - now afebrile > 48 hours  - urine cx. & blood cx. NGTD 48 hours; cont Zosyn Clear bilaterally, pupils equal, round and reactive to light.

## 2017-11-04 NOTE — PROGRESS NOTE ADULT - PROBLEM SELECTOR PLAN 7
-A 3.5 x 3.3 cm infrarenal aortic aneurysm  -Outpatient f/u - 3.5 x 3.3 cm infrarenal aortic aneurysm  - cont monitor outpatient - likely multifactorial 2/2 RP mass causing hydronephrosis vs. BPH vs. pre-renal etiology in setting of poor PO intake/sepsis  - off IV fluids, urine output acceptable

## 2017-11-05 ENCOUNTER — TRANSCRIPTION ENCOUNTER (OUTPATIENT)
Age: 72
End: 2017-11-05

## 2017-11-05 LAB
BUN SERPL-MCNC: 12 MG/DL — SIGNIFICANT CHANGE UP (ref 7–23)
CALCIUM SERPL-MCNC: 8.6 MG/DL — SIGNIFICANT CHANGE UP (ref 8.4–10.5)
CHLORIDE SERPL-SCNC: 97 MMOL/L — LOW (ref 98–107)
CO2 SERPL-SCNC: 23 MMOL/L — SIGNIFICANT CHANGE UP (ref 22–31)
CREAT ?TM UR-MCNC: 75.38 MG/DL — SIGNIFICANT CHANGE UP
CREAT SERPL-MCNC: 1.38 MG/DL — HIGH (ref 0.5–1.3)
GLUCOSE SERPL-MCNC: 113 MG/DL — HIGH (ref 70–99)
POTASSIUM SERPL-MCNC: 4 MMOL/L — SIGNIFICANT CHANGE UP (ref 3.5–5.3)
POTASSIUM SERPL-SCNC: 4 MMOL/L — SIGNIFICANT CHANGE UP (ref 3.5–5.3)
SODIUM SERPL-SCNC: 134 MMOL/L — LOW (ref 135–145)
SODIUM UR-SCNC: 69 MEQ/L — SIGNIFICANT CHANGE UP
UUN UR-MCNC: 317 MG/DL — SIGNIFICANT CHANGE UP

## 2017-11-05 PROCEDURE — 99233 SBSQ HOSP IP/OBS HIGH 50: CPT | Mod: GC

## 2017-11-05 RX ORDER — SIMETHICONE 80 MG/1
1 TABLET, CHEWABLE ORAL
Qty: 0 | Refills: 0 | COMMUNITY
Start: 2017-11-05

## 2017-11-05 RX ORDER — SODIUM CHLORIDE 9 MG/ML
1000 INJECTION INTRAMUSCULAR; INTRAVENOUS; SUBCUTANEOUS
Qty: 0 | Refills: 0 | Status: DISCONTINUED | OUTPATIENT
Start: 2017-11-05 | End: 2017-11-06

## 2017-11-05 RX ORDER — POLYETHYLENE GLYCOL 3350 17 G/17G
17 POWDER, FOR SOLUTION ORAL
Qty: 0 | Refills: 0 | Status: DISCONTINUED | OUTPATIENT
Start: 2017-11-05 | End: 2017-11-17

## 2017-11-05 RX ORDER — SENNA PLUS 8.6 MG/1
2 TABLET ORAL
Qty: 0 | Refills: 0 | COMMUNITY
Start: 2017-11-05

## 2017-11-05 RX ORDER — SODIUM CHLORIDE 9 MG/ML
1000 INJECTION INTRAMUSCULAR; INTRAVENOUS; SUBCUTANEOUS
Qty: 0 | Refills: 0 | Status: DISCONTINUED | OUTPATIENT
Start: 2017-11-05 | End: 2017-11-05

## 2017-11-05 RX ORDER — HYDROMORPHONE HYDROCHLORIDE 2 MG/ML
1 INJECTION INTRAMUSCULAR; INTRAVENOUS; SUBCUTANEOUS EVERY 4 HOURS
Qty: 0 | Refills: 0 | Status: DISCONTINUED | OUTPATIENT
Start: 2017-11-05 | End: 2017-11-08

## 2017-11-05 RX ADMIN — HYDROMORPHONE HYDROCHLORIDE 1 MILLIGRAM(S): 2 INJECTION INTRAMUSCULAR; INTRAVENOUS; SUBCUTANEOUS at 22:05

## 2017-11-05 RX ADMIN — HYDROMORPHONE HYDROCHLORIDE 1 MILLIGRAM(S): 2 INJECTION INTRAMUSCULAR; INTRAVENOUS; SUBCUTANEOUS at 21:50

## 2017-11-05 RX ADMIN — Medication 100 MILLIGRAM(S): at 13:43

## 2017-11-05 RX ADMIN — TAMSULOSIN HYDROCHLORIDE 0.4 MILLIGRAM(S): 0.4 CAPSULE ORAL at 21:57

## 2017-11-05 RX ADMIN — PIPERACILLIN AND TAZOBACTAM 25 GRAM(S): 4; .5 INJECTION, POWDER, LYOPHILIZED, FOR SOLUTION INTRAVENOUS at 21:57

## 2017-11-05 RX ADMIN — SENNA PLUS 2 TABLET(S): 8.6 TABLET ORAL at 21:57

## 2017-11-05 RX ADMIN — Medication 325 MILLIGRAM(S): at 13:43

## 2017-11-05 RX ADMIN — HEPARIN SODIUM 5000 UNIT(S): 5000 INJECTION INTRAVENOUS; SUBCUTANEOUS at 21:56

## 2017-11-05 RX ADMIN — HYDROMORPHONE HYDROCHLORIDE 0.5 MILLIGRAM(S): 2 INJECTION INTRAMUSCULAR; INTRAVENOUS; SUBCUTANEOUS at 13:44

## 2017-11-05 RX ADMIN — FINASTERIDE 5 MILLIGRAM(S): 5 TABLET, FILM COATED ORAL at 13:44

## 2017-11-05 RX ADMIN — POLYETHYLENE GLYCOL 3350 17 GRAM(S): 17 POWDER, FOR SOLUTION ORAL at 17:33

## 2017-11-05 RX ADMIN — HYDROMORPHONE HYDROCHLORIDE 0.5 MILLIGRAM(S): 2 INJECTION INTRAMUSCULAR; INTRAVENOUS; SUBCUTANEOUS at 05:30

## 2017-11-05 RX ADMIN — PIPERACILLIN AND TAZOBACTAM 25 GRAM(S): 4; .5 INJECTION, POWDER, LYOPHILIZED, FOR SOLUTION INTRAVENOUS at 13:44

## 2017-11-05 RX ADMIN — HEPARIN SODIUM 5000 UNIT(S): 5000 INJECTION INTRAVENOUS; SUBCUTANEOUS at 13:45

## 2017-11-05 RX ADMIN — HYDROMORPHONE HYDROCHLORIDE 0.5 MILLIGRAM(S): 2 INJECTION INTRAMUSCULAR; INTRAVENOUS; SUBCUTANEOUS at 05:15

## 2017-11-05 RX ADMIN — Medication 100 MILLIGRAM(S): at 05:17

## 2017-11-05 RX ADMIN — HEPARIN SODIUM 5000 UNIT(S): 5000 INJECTION INTRAVENOUS; SUBCUTANEOUS at 05:17

## 2017-11-05 RX ADMIN — HYDROMORPHONE HYDROCHLORIDE 0.5 MILLIGRAM(S): 2 INJECTION INTRAMUSCULAR; INTRAVENOUS; SUBCUTANEOUS at 14:00

## 2017-11-05 RX ADMIN — PIPERACILLIN AND TAZOBACTAM 25 GRAM(S): 4; .5 INJECTION, POWDER, LYOPHILIZED, FOR SOLUTION INTRAVENOUS at 05:17

## 2017-11-05 RX ADMIN — SODIUM CHLORIDE 75 MILLILITER(S): 9 INJECTION INTRAMUSCULAR; INTRAVENOUS; SUBCUTANEOUS at 11:35

## 2017-11-05 RX ADMIN — SODIUM CHLORIDE 75 MILLILITER(S): 9 INJECTION INTRAMUSCULAR; INTRAVENOUS; SUBCUTANEOUS at 11:13

## 2017-11-05 NOTE — PROGRESS NOTE ADULT - PROBLEM SELECTOR PLAN 4
- complicated by epigastric pain radiating to RUQ - Now controlled  - patient now aware of PRN pain medications; will monitor pain response  - Tylenol PRN for mild to moderate pain  - Dilaudid 0.5 IV q6 PRN for severe pain   - no bowel movement for 4 days without significant stool burden seen on CT A/P; cont bowel regimen and monitor - complicated by epigastric pain radiating to RUQ - Now improved, patient has mild RLQ which is well controlled on PRN, Patient has taken 4 Dilaudid PRN's in 24hrs  - will monitor pain response  - Tylenol PRN for mild to moderate pain  - Dilaudid 0.5 IV q6 PRN for severe pain   - no bowel movement for 4 days without significant stool burden seen on CT A/P; cont bowel regimen and monitor

## 2017-11-05 NOTE — PROGRESS NOTE ADULT - PROBLEM SELECTOR PLAN 5
- RP mass visualized on CT A/P with R hydronephrosis along concerning for metastatic disease?  - F/u Heme/onc recs, requesting tissue biopsy  - Pain control for now adequate - RP mass visualized on CT A/P with R hydronephrosis along concerning for metastatic disease?  - F/u Heme/onc recs, requesting tissue biopsy of RP mass  - Pain control for now adequate

## 2017-11-05 NOTE — PROGRESS NOTE ADULT - SUBJECTIVE AND OBJECTIVE BOX
Patient is a 72y old  Male who presents with a chief complaint of Came for complaints of Epigastric Pain radiating from the right side Abdomen to the left side, no nausea or vomiting (02 Nov 2017 18:32)      SUBJECTIVE / OVERNIGHT EVENTS:  No acute events overnight. Patient seen and examined in AM. Patient denied fevers, CP, SOB, lower extremity pain. Abdominal Pain     MEDICATIONS  (STANDING):  docusate sodium 100 milliGRAM(s) Oral three times a day  ferrous    sulfate 325 milliGRAM(s) Oral daily  finasteride 5 milliGRAM(s) Oral daily  heparin  Injectable 5000 Unit(s) SubCutaneous every 8 hours  piperacillin/tazobactam IVPB. 3.375 Gram(s) IV Intermittent every 8 hours  polyethylene glycol 3350 17 Gram(s) Oral daily  senna 2 Tablet(s) Oral at bedtime  tamsulosin 0.4 milliGRAM(s) Oral at bedtime    MEDICATIONS  (PRN):  acetaminophen   Tablet 650 milliGRAM(s) Oral every 6 hours PRN For Temp greater than 38 C (100.4 F)  acetaminophen   Tablet. 650 milliGRAM(s) Oral every 6 hours PRN Mild to moderate pain 1-5  HYDROmorphone  Injectable 0.5 milliGRAM(s) IV Push every 6 hours PRN Severe pain 6-10  simethicone 80 milliGRAM(s) Chew every 6 hours PRN Dyspepsia        CAPILLARY BLOOD GLUCOSE        I&O's Summary    04 Nov 2017 08:01  -  05 Nov 2017 07:00  --------------------------------------------------------  IN: 0 mL / OUT: 1850 mL / NET: -1850 mL      PHYSICAL EXAM:  Vital Signs Last 24 Hrs  T(C): 37 (11-05-17 @ 05:26)  T(F): 98.6 (11-05-17 @ 05:26), Max: 98.6 (11-05-17 @ 05:26)  HR: 80 (11-05-17 @ 05:26) (75 - 80)  BP: 137/82 (11-05-17 @ 05:26)  BP(mean): --  RR: 18 (11-05-17 @ 05:26) (18 - 18)  SpO2: 99% (11-05-17 @ 05:26) (99% - 100%)  Wt(kg): --    11-04 @ 08:01  -  11-05 @ 07:00  --------------------------------------------------------  IN: 0 mL / OUT: 1850 mL / NET: -1850 mL      Constitutional: NAD, awake and alert  EYES: EOMI  ENT:  Normal Hearing, no tonsillar exudates   Neck: Soft and supple, No JVD  Respiratory: Breath sounds are clear bilaterally, No wheezing, rales or rhonchi  Cardiovascular: S1 and S2, regular rate and rhythm, no Murmurs, gallops or rubs  Gastrointestinal: Bowel Sounds present, soft, +epigastrium/RUQ/R. flank TTP, Chevron scar well healed, nondistended, no guarding, no rebound  Extremities: No cyanosis or clubbing; warm to touch  Vascular: 2+ peripheral pulses lower ex  Neurological: A/O x 3, no focal deficits  Musculoskeletal: 5/5 strength b/l upper and lower extremities  Skin: No rashes, warm & dry  Psych: no depression or anhedonia  HEME: no bruises, no nose bleeds      LABS:                        11.1   5.74  )-----------( 240      ( 04 Nov 2017 06:00 )             34.2     11-04    136  |  99  |  12  ----------------------------<  100<H>  4.2   |  23  |  1.44<H>    Ca    8.7      04 Nov 2017 06:00  Phos  2.5     11-04  Mg     2.2     11-04                    RADIOLOGY & ADDITIONAL TESTS:    Imaging Personally Reviewed:    Consultant(s) Notes Reviewed:      Care Discussed with Consultants/Other Providers: Patient is a 72y old  Male who presents with a chief complaint of Came for complaints of Epigastric Pain radiating from the right side Abdomen to the left side, no nausea or vomiting (02 Nov 2017 18:32)      SUBJECTIVE / OVERNIGHT EVENTS:  No acute events overnight. Patient seen and examined in AM. Patient denied fevers, CP, SOB, lower extremity pain. Patient has mild abdominal pain 5/10, RLQ non-radiating.     MEDICATIONS  (STANDING):  docusate sodium 100 milliGRAM(s) Oral three times a day  ferrous    sulfate 325 milliGRAM(s) Oral daily  finasteride 5 milliGRAM(s) Oral daily  heparin  Injectable 5000 Unit(s) SubCutaneous every 8 hours  piperacillin/tazobactam IVPB. 3.375 Gram(s) IV Intermittent every 8 hours  polyethylene glycol 3350 17 Gram(s) Oral daily  senna 2 Tablet(s) Oral at bedtime  tamsulosin 0.4 milliGRAM(s) Oral at bedtime    MEDICATIONS  (PRN):  acetaminophen   Tablet 650 milliGRAM(s) Oral every 6 hours PRN For Temp greater than 38 C (100.4 F)  acetaminophen   Tablet. 650 milliGRAM(s) Oral every 6 hours PRN Mild to moderate pain 1-5  HYDROmorphone  Injectable 0.5 milliGRAM(s) IV Push every 6 hours PRN Severe pain 6-10  simethicone 80 milliGRAM(s) Chew every 6 hours PRN Dyspepsia        CAPILLARY BLOOD GLUCOSE        I&O's Summary    04 Nov 2017 08:01  -  05 Nov 2017 07:00  --------------------------------------------------------  IN: 0 mL / OUT: 1850 mL / NET: -1850 mL      PHYSICAL EXAM:  Vital Signs Last 24 Hrs  T(C): 37 (11-05-17 @ 05:26)  T(F): 98.6 (11-05-17 @ 05:26), Max: 98.6 (11-05-17 @ 05:26)  HR: 80 (11-05-17 @ 05:26) (75 - 80)  BP: 137/82 (11-05-17 @ 05:26)  BP(mean): --  RR: 18 (11-05-17 @ 05:26) (18 - 18)  SpO2: 99% (11-05-17 @ 05:26) (99% - 100%)  Wt(kg): --    11-04 @ 08:01  -  11-05 @ 07:00  --------------------------------------------------------  IN: 0 mL / OUT: 1850 mL / NET: -1850 mL      Constitutional: NAD, awake and alert  EYES: EOMI  ENT:  Normal Hearing, no tonsillar exudates   Neck: Soft and supple, No JVD  Respiratory: Breath sounds are clear bilaterally, No wheezing, rales or rhonchi  Cardiovascular: S1 and S2, regular rate and rhythm, no Murmurs, gallops or rubs  Gastrointestinal: Bowel Sounds present, soft, +RLQ TTP, No CVA tenderness or flank pain. Chevron scar well healed, nondistended, no guarding, no rebound  Extremities: No cyanosis or clubbing; warm to touch  Vascular: 2+ peripheral pulses lower ex  Neurological: A/O x 3, no focal deficits  Musculoskeletal: 5/5 strength b/l upper and lower extremities  Skin: No rashes, warm & dry  Psych: no depression or anhedonia  HEME: no bruises, no nose bleeds      LABS:                        11.1   5.74  )-----------( 240      ( 04 Nov 2017 06:00 )             34.2     11-04    136  |  99  |  12  ----------------------------<  100<H>  4.2   |  23  |  1.44<H>    Ca    8.7      04 Nov 2017 06:00  Phos  2.5     11-04  Mg     2.2     11-04

## 2017-11-05 NOTE — PROGRESS NOTE ADULT - PROBLEM SELECTOR PLAN 1
- febrile to 101.8 on 11/2; now afebrile for 72 hours with urine cx. & blood cx. showing NGTD  - possibly secondary to ureteral compression in setting of new right RP mass  now s/p stent  - cont Zosyn q8; length of therapy TBD, anticipate patient will require at least 10 days total of ABX  -  Ludwig d/c'ed yesterday given afebrile > 24hrs; post void-residual on bladder scan- 107  - outpatient Urology follow up for stent exchange in 3 months - febrile to 101.8 on 11/2; now afebrile for 72 hours with urine cx. & blood cx. showing NGTD  - possibly secondary to ureteral compression in setting of new right RP mass  now s/p stent  - cont Zosyn q8; length of therapy TBD, anticipate patient will require at least 10 days total of ABX however w/o organism on Urine/Blood difficult to truly quantify duration.   -  Ludwig d/c'ed yesterday given afebrile > 24hrs; post void-residual on bladder scan- 107  - outpatient Urology follow up for stent exchange in 3 months

## 2017-11-05 NOTE — DISCHARGE NOTE ADULT - CONDITION (STATED IN TERMS THAT PERMIT A SPECIFIC MEASURABLE COMPARISON WITH CONDITION ON ADMISSION):
Patient was hemodynamically Patient was not experiencing any abdominal pain and was hemodynamically stable on day of discharge.

## 2017-11-05 NOTE — DISCHARGE NOTE ADULT - ADDITIONAL INSTRUCTIONS
Please follow up with your primary care doctor and oncologist within 1 week of discharge. Please follow up with your primary care doctor and oncologist within 1 week of discharge. You will need to follow-up with the Urologist in 3 months for ureteral stent exchange. Their Number is [phone]

## 2017-11-05 NOTE — PROGRESS NOTE ADULT - PROBLEM SELECTOR PLAN 6
- likely multifactorial 2/2 RP mass causing hydronephrosis vs. BPH vs. pre-renal etiology in setting of poor PO intake/sepsis  - off IV fluids, urine output acceptable  - Cr still elevated, would likely restart fluids as patient having post obstruction diuresis  - Will check urine studies - likely multifactorial 2/2 RP mass causing hydronephrosis vs. BPH vs. pre-renal etiology in setting of poor PO intake/sepsis  -  Currently off IV fluids, urine output acceptable. Negative 1.85L in 24hrs, Prior 24hrs patient was -5L   - Cr still elevated, will likely restart fluids as patient having post obstruction diuresis  - Will check urine studies however clinical picture strongly points to post- obstruction diuresis

## 2017-11-05 NOTE — DISCHARGE NOTE ADULT - CARE PROVIDER_API CALL
Urology, [facility]  [phone]  Fax [phone] [address] [place] [address]  Phone: (   )    -  Fax: (   )    -    Leslye Jain), Internal Medicine  [id number] [place], [place] [address]  Phone: [phone]  Fax: [phone]

## 2017-11-05 NOTE — PROGRESS NOTE ADULT - PROBLEM SELECTOR PLAN 7
- Patient with enlarged prostate on CT A/P  - cont tamsulosin & finasteride  - Good urine outpt w/o bryson - Patient with enlarged prostate on CT A/P  - cont tamsulosin & finasteride  - Good urine outpt w/o bryson  - Will check urine studies but suspect elevated Cr 2/2 post- obstructive diuresis

## 2017-11-05 NOTE — CHART NOTE - NSCHARTNOTEFT_GEN_A_CORE
NUTRITION SERVICES                                                                                  MALNUTRITION ALERT     Attention Health Care Provider: Upon nutritional assessment by the Registered Dietitian your patient was determined to meet criteria / has evidence of the following diagnosis/diagnoses:    [ ] Mild Protein Calorie Malnutrition   [ ] Moderate Protein Calorie Malnutrition   [x ] Severe Protein Calorie Malnutrition   [ ] Unspecified Protein Calorie Malnutrition   [ ] Underweight / BMI <19  [ ] Morbid Obesity / BMI >40          By signing this assessment you are acknowledging the diagnosis/diagnoses.       PLAN OF CARE: Refer to Initial Dietitian Evaluation or Nutrition Follow-Up Documentation for Nutritional Recommendations.

## 2017-11-05 NOTE — PROGRESS NOTE ADULT - PROBLEM SELECTOR PLAN 3
- right RP mass with multiple arterially enhancing lesions at the hepatic dome found on CT A/P concerning for recurrence of pancreatic CA vs. new malignancy?   - CEA noted to be elevated to 6.3 in June  - will obtain tissue pathology per Heme/Onc recommendations - IR consulted however biopsy anticipated to technically challenging due to location   - GI initially deferred to IR, however GI will discuss with Radiology and advanced Endoscopy for EUS-guided biopsy on monday. Will make patient NPO tonight at [facility]

## 2017-11-05 NOTE — DISCHARGE NOTE ADULT - MEDICATION SUMMARY - MEDICATIONS TO TAKE
I will START or STAY ON the medications listed below when I get home from the hospital:    finasteride 5 mg oral tablet  -- 1 tab(s) by mouth once a day  -- Indication: For Benign prostate hyperplasia    Dilaudid 2 mg oral tablet  -- 1 tab(s) by mouth every 3 hours, As Needed  only for breakthrough pain MDD:16mg  -- Caution federal law prohibits the transfer of this drug to any person other  than the person for whom it was prescribed.  May cause drowsiness.  Alcohol may intensify this effect.  Use care when operating dangerous machinery.  This prescription cannot be refilled.  Using more of this medication than prescribed may cause serious breathing problems.    -- Indication: For Abdominal pain    Dilaudid 2 mg oral tablet  -- 3 tab(s) by mouth every 6 hours MDD:24mg  -- Caution federal law prohibits the transfer of this drug to any person other  than the person for whom it was prescribed.  May cause drowsiness.  Alcohol may intensify this effect.  Use care when operating dangerous machinery.  This prescription cannot be refilled.  Using more of this medication than prescribed may cause serious breathing problems.    -- Indication: For Abdominal pain    tamsulosin 0.4 mg oral capsule  -- 1 cap(s) by mouth once a day  -- Indication: For Benign prostate hyperplasia    Lovenox 80 mg/0.8 mL injectable solution  -- 65 milligram(s) injectable 2 times a day   -- It is very important that you take or use this exactly as directed.  Do not skip doses or discontinue unless directed by your doctor.    -- Indication: For Anticoagulation    nystatin 100,000 units/mL oral suspension  -- 5 milliliter(s) by mouth every 4 hours  Indication: thrush  -- Indication: For Thrush    ferrous sulfate 324 mg (65 mg elemental iron) oral tablet  -- 1 tab(s) by mouth once a day  -- Indication: For Nutritional supplementation    docusate sodium 100 mg oral tablet  -- 1 tab(s) by mouth 3 times a day  -- Indication: For constipation    senna oral tablet  -- 2 tab(s) by mouth once a day (at bedtime)  -- Indication: For constipation    simethicone 80 mg oral tablet, chewable  -- 1 tab(s) by mouth every 6 hours, As needed, Dyspepsia  -- Indication: For dyspepsia    Levaquin 750 mg oral tablet  -- 1 tab(s) by mouth once a day   Indication: bacteremia  Duration of antibiotic: 11/14 to 11/28  -- Avoid prolonged or excessive exposure to direct and/or artificial sunlight while taking this medication.  Do not take dairy products, antacids, or iron preparations within one hour of this medication.  Finish all this medication unless otherwise directed by prescriber.  May cause drowsiness or dizziness.  Medication should be taken with plenty of water.    -- Indication: For Bacteremia due to Escherichia coli

## 2017-11-05 NOTE — DIETITIAN INITIAL EVALUATION ADULT. - PROBLEM SELECTOR PLAN 3
- Patient with hydronephrosis of R kidney seen on CT A/P 2/2 RP mass  - Urology consulted in the ED, appreciate recommendations  - No intervention per Urology at this time however Please page urology immediately at #[id number] for any fever > 100.4, as patient will need urgent decompression.  - Enlarged prostate seen on CT A/P

## 2017-11-05 NOTE — DIETITIAN INITIAL EVALUATION ADULT. - OTHER INFO
Nutrition consult received on 11/2/17 for unintentional wt. loss prior to admission. Pt. alert, , Norwegian speaking , son @ bedside , translated , reports wt. loss 25# in last 3 weeks and poor PO nutrition last 3 weeks 2/2 abdominal pain s/p eating and nausea , poor appetite .

## 2017-11-05 NOTE — DISCHARGE NOTE ADULT - PLAN OF CARE
To continue to be pain free Your abdominal pain was secondary to your mass causing hydronephrosis. Urology was consulted to place a stent to relieve the obstruction on the kidney.  Your pain was well controlled on IV pain medications and you were transitioned to oral pain medications. Please take your medications as prescribed and follow up with your PMD within one week of discharge from the hospital.  You will need to follow-up with the Urologist in 3 months for Ureteral Stent exchange. Their Number is [phone] You had a Biopsy of the Retroperitoneal mass which showed XXXXX Please follow up with your Oncologist Dr. Jain at [facility] within one week after discharge from the hospital Your abdominal pain was secondary to your mass causing hydronephrosis. Urology was consulted to place a stent to relieve the obstruction on the kidney. You were also found to have a mass in one of your veins that could also be causing your abdominal pain. Surgery determined that no surgical intervention was indicated during time of this admission. You should follow up with your oncologist within 1 week of discharge  Your pain was well controlled on IV pain medications and you were transitioned to oral pain medications. Please take your medications as prescribed and follow up with your PMD within one week of discharge from the hospital.  You will need to follow-up with the Urologist in 3 months for Ureteral Stent exchange. Their Number is [phone] You had a Biopsy of the Retroperitoneal mass and the preliminary pathology showed that this is most likely adenocarcinoma. This may likely be a recurrence of the pancreatic cancer. Please follow up with your oncologist within 1 week so that they could read the final results to you and discuss further management. Please follow up with your primary care doctor and oncologist within 1 week of discharge. Please follow up with your urologist within 3 months of discharge. Your abdominal pain was secondary to your mass causing hydronephrosis. Urology was consulted to place a stent to relieve the obstruction on the kidney. You were also found to have a mass in one of your veins that could also be contributing to your abdominal pain. Surgery determined that no surgical intervention was indicated during time of this admission. You should follow up with your oncologist within 1 week of discharge for further management. Your pain was well controlled on IV pain medications and you were transitioned to oral pain medications. Please take your medications as prescribed and follow up with your primary within one week of discharge from the hospital.  You will need to follow-up with the Urologist in 3 months for ureteral stent exchange. Their Number is [phone] Please follow up with your primary care doctor and oncologist within 1 week of discharge. You had a biopsy of the retroperitoneal mass and the preliminary pathology showed that this is most likely adenocarcinoma. This may likely be a recurrence of the pancreatic cancer. Please follow up with your oncologist within 1 week so that they could read the final results to you and discuss further management. You also developed an infection within your blood after the surgery and were started on antibiotics to treat this infection. You should continue to take this antibiotic, levofloxacin, until 11/28. It is important that you do not skip any doses and complete the full course of this antibiotic. Please follow up with your Oncologist Dr. Jain at [facility] within one week after discharge from the hospital. You had a biopsy of the retroperitoneal mass and the final pathology result showed that this is pancreatic adenocarcinoma. Please follow up with your oncologist within 1 week so that you can discuss further management. You also developed an infection within your blood after the surgery and were started on antibiotics to treat this infection. You should continue to take this antibiotic, levofloxacin, until 11/28. It is important that you do not skip any doses and complete the full course of this antibiotic.

## 2017-11-05 NOTE — DISCHARGE NOTE ADULT - OTHER SIGNIFICANT FINDINGS
CT A/P 11/2:   IMPRESSION:  Status post Whipple procedure with no significant change in appearance of  the pancreas.  Interval progression of soft tissue recurrence in the retroperitoneum with  progression in moderate right hydronephrosis.  Suggestion of multiple arterially enhancing lesions at the hepatic dome.  This can be better evaluated with MRI on outpatient basis.    CT A/P 11/13:  IMPRESSION:  Placement of a right nephroureteral stent with interval resolution of  right-sided hydronephrosis. Persistent hypoenhancement of the right kidney.  Bladder wall thickening. Correlate for underlying infection.  Retroperitoneal soft tissue mass inseparable from the IVC and jejunum.  Filling defect within the IVC which probably represents thrombus. Distended  infrarenal IVC and common iliac veins, suspicious for thrombus.    US duplex LE 11/14:   IMPRESSION:  No evidence of bilateral lower extremity deep venous thrombosis.    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

## 2017-11-05 NOTE — DISCHARGE NOTE ADULT - PATIENT PORTAL LINK FT
“You can access the Follow[employer] Patient Portal, offered by [facility], by registering with the following website: [email or web address]”

## 2017-11-05 NOTE — DISCHARGE NOTE ADULT - VISION (WITH CORRECTIVE LENSES IF THE PATIENT USUALLY WEARS THEM):
prescription glasses/Partially impaired: cannot see medication labels or newsprint, but can see obstacles in path, and the surrounding layout; can count fingers at arm's length

## 2017-11-05 NOTE — DISCHARGE NOTE ADULT - HOSPITAL COURSE
72M with pancreatic adenocarcinoma (dx. 2015) s/p whipple s/p gemcitabine (x 6 cycles) & RT (completed 2016) , s/p Xeloda presents for 2-week history of abdominal pain found to have right RP mass on CT likely recurrence of pancreatic CA vs. new malignancy c/b R. Ureteral compression and hydronephrosis with HILTON requiring ureteral stent on 11/2. Patient was also started on Zosyn for likely pyelonephritis, no growth on ucx or bcx. HILTON improved with ureteral stent placement and IVF.    Abdominal Pain was well controlled with IV .5mg PRN Dilaudid q 6hrs.     Heme/onc was consulted for RP mass, required tissue biopsy. Because of the location of the mass, IR was unable to access. GI was consulted for EUS guided Biopsy which is pending for 11/6 72M with pancreatic adenocarcinoma (dx. 2015) s/p whipple s/p gemcitabine (x 6 cycles) & RT (completed 2016) , s/p Xeloda presents for 2-week history of abdominal pain found to have right RP mass on CT likely recurrence of pancreatic CA vs. new malignancy c/b R. Ureteral compression and hydronephrosis with HILTON requiring ureteral stent on 11/2. Patient completed a course of Zosyn for likely pyelonephritis, no growth on ucx or bcx. HILTON improved after ureteral stent placement and IVF.    Heme/onc was consulted for RP mass - recommended tissue biopsy. Because of the location of the mass, GI was unable to access via EUS-guided biopsy so IR was consulted and was able to obtain RP mass biopsy but had to go through bowel. 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 He was initially treated with zosyn once fever was noted along with c/f worsening abdominal pain s/p procedure. Switched to meropenem because spiking fevers while on zosyn but bacteria found to be meropenem-resistant so levofloxacin (s/p sensitivities) was added and meropenem continued as per ID recs. Patient was afebrile with neg BCx  >48 hours prior to discharge on this regimen. Meropenem discontinued on day of discharge with plan to continue levofloxacin for 2 week course from date of neg BCx (11/14). Preliminary surgical pathology of RP mass showed adenocarcinoma with official report pending. Abdominal pain was well-controlled on IV dilaudid throughout admission and tolerated PO dilaudid well on day of discharge. Patient had no abdominal pain and was hemodynamically stable on day of discharge. 72M with pancreatic adenocarcinoma (dx. 2015) s/p whipple s/p gemcitabine (x 6 cycles) & RT (completed 2016) , s/p Xeloda presents for 2-week history of abdominal pain found to have right RP mass on CT likely recurrence of pancreatic CA vs. new malignancy c/b R. Ureteral compression and hydronephrosis with HILTON requiring ureteral stent on 11/2. Patient completed a course of Zosyn for likely pyelonephritis, no growth on ucx or bcx. HILTON improved after ureteral stent placement and IVF.    Heme/onc was consulted for RP mass - recommended tissue biopsy. Because of the location of the mass, GI was unable to access via EUS-guided biopsy so IR was consulted and was able to obtain RP mass biopsy but had to go through bowel. 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 He was initially treated with zosyn once fever was noted along with c/f worsening abdominal pain s/p procedure. Switched to meropenem because spiking fevers while on zosyn but bacteria found to be meropenem-resistant so levofloxacin (s/p sensitivities) was added and meropenem continued as per ID recs. Patient was afebrile with neg BCx  >48 hours prior to discharge on this regimen. Meropenem discontinued on day of discharge with plan to continue levofloxacin for 2 week course from date of neg BCx (11/14). Preliminary surgical pathology of RP mass showed adenocarcinoma with official report pending. Abdominal pain was well-controlled on IV dilaudid throughout admission and tolerated PO dilaudid well on day of discharge. 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  Daughter was instructed how to inject Lovenox.  Patient had no abdominal pain and was hemodynamically stable on day of discharge.  Pt has followup with outpatient oncologist on 11/25.

## 2017-11-05 NOTE — DISCHARGE NOTE ADULT - CARE PLAN
Principal Discharge DX:	Abdominal pain  Goal:	To continue to be pain free  Instructions for follow-up, activity and diet:	Your abdominal pain was secondary to your mass causing hydronephrosis. Urology was consulted to place a stent to relieve the obstruction on the kidney.  Your pain was well controlled on IV pain medications and you were transitioned to oral pain medications. Please take your medications as prescribed and follow up with your PMD within one week of discharge from the hospital.  You will need to follow-up with the Urologist in 3 months for Ureteral Stent exchange. Their Number is [phone]  Secondary Diagnosis:	Retroperitoneal mass  Instructions for follow-up, activity and diet:	You had a Biopsy of the Retroperitoneal mass which showed XXXXX  Secondary Diagnosis:	Pancreatic carcinoma  Instructions for follow-up, activity and diet:	Please follow up with your Oncologist Dr. Jain at [facility] within one week after discharge from the hospital Principal Discharge DX:	Abdominal pain  Goal:	To continue to be pain free  Instructions for follow-up, activity and diet:	Your abdominal pain was secondary to your mass causing hydronephrosis. Urology was consulted to place a stent to relieve the obstruction on the kidney. You were also found to have a mass in one of your veins that could also be causing your abdominal pain. Surgery determined that no surgical intervention was indicated during time of this admission. You should follow up with your oncologist within 1 week of discharge  Your pain was well controlled on IV pain medications and you were transitioned to oral pain medications. Please take your medications as prescribed and follow up with your PMD within one week of discharge from the hospital.  You will need to follow-up with the Urologist in 3 months for Ureteral Stent exchange. Their Number is [phone]  Secondary Diagnosis:	Retroperitoneal mass  Instructions for follow-up, activity and diet:	You had a Biopsy of the Retroperitoneal mass and the preliminary pathology showed that this is most likely adenocarcinoma. This may likely be a recurrence of the pancreatic cancer. Please follow up with your oncologist within 1 week so that they could read the final results to you and discuss further management.  Secondary Diagnosis:	Pancreatic carcinoma  Instructions for follow-up, activity and diet:	Please follow up with your Oncologist Dr. Jain at [facility] within one week after discharge from the hospital Principal Discharge DX:	Abdominal pain  Goal:	Please follow up with your primary care doctor and oncologist within 1 week of discharge. Please follow up with your urologist within 3 months of discharge.  Instructions for follow-up, activity and diet:	Your abdominal pain was secondary to your mass causing hydronephrosis. Urology was consulted to place a stent to relieve the obstruction on the kidney. You were also found to have a mass in one of your veins that could also be contributing to your abdominal pain. Surgery determined that no surgical intervention was indicated during time of this admission. You should follow up with your oncologist within 1 week of discharge for further management. Your pain was well controlled on IV pain medications and you were transitioned to oral pain medications. Please take your medications as prescribed and follow up with your primary within one week of discharge from the hospital.  You will need to follow-up with the Urologist in 3 months for ureteral stent exchange. Their Number is [phone]  Secondary Diagnosis:	Retroperitoneal mass  Goal:	Please follow up with your primary care doctor and oncologist within 1 week of discharge.  Instructions for follow-up, activity and diet:	You had a biopsy of the retroperitoneal mass and the preliminary pathology showed that this is most likely adenocarcinoma. This may likely be a recurrence of the pancreatic cancer. Please follow up with your oncologist within 1 week so that they could read the final results to you and discuss further management. You also developed an infection within your blood after the surgery and were started on antibiotics to treat this infection. You should continue to take this antibiotic, levofloxacin, until 11/28. It is important that you do not skip any doses and complete the full course of this antibiotic.  Secondary Diagnosis:	Pancreatic carcinoma  Goal:	Please follow up with your primary care doctor and oncologist within 1 week of discharge.  Instructions for follow-up, activity and diet:	Please follow up with your Oncologist Dr. Jain at [facility] within one week after discharge from the hospital. Principal Discharge DX:	Abdominal pain  Goal:	Please follow up with your primary care doctor and oncologist within 1 week of discharge. Please follow up with your urologist within 3 months of discharge.  Instructions for follow-up, activity and diet:	Your abdominal pain was secondary to your mass causing hydronephrosis. Urology was consulted to place a stent to relieve the obstruction on the kidney. You were also found to have a mass in one of your veins that could also be contributing to your abdominal pain. Surgery determined that no surgical intervention was indicated during time of this admission. You should follow up with your oncologist within 1 week of discharge for further management. Your pain was well controlled on IV pain medications and you were transitioned to oral pain medications. Please take your medications as prescribed and follow up with your primary within one week of discharge from the hospital.  You will need to follow-up with the Urologist in 3 months for ureteral stent exchange. Their Number is [phone]  Secondary Diagnosis:	Retroperitoneal mass  Goal:	Please follow up with your primary care doctor and oncologist within 1 week of discharge.  Instructions for follow-up, activity and diet:	You had a biopsy of the retroperitoneal mass and the final pathology result showed that this is pancreatic adenocarcinoma. Please follow up with your oncologist within 1 week so that you can discuss further management. You also developed an infection within your blood after the surgery and were started on antibiotics to treat this infection. You should continue to take this antibiotic, levofloxacin, until 11/28. It is important that you do not skip any doses and complete the full course of this antibiotic.  Secondary Diagnosis:	Pancreatic carcinoma  Goal:	Please follow up with your primary care doctor and oncologist within 1 week of discharge.  Instructions for follow-up, activity and diet:	Please follow up with your Oncologist Dr. Jain at [facility] within one week after discharge from the hospital.

## 2017-11-05 NOTE — DISCHARGE NOTE ADULT - PROVIDER TOKENS
FREE:[LAST:[Urology],FIRST:[[facility]],PHONE:[(   )    -],FAX:[(   )    -],ADDRESS:[phone]  Fax [phone]  [address] [place] [address]]],TOKEN:'[id number]'

## 2017-11-05 NOTE — PROGRESS NOTE ADULT - ASSESSMENT
72M with pancreatic adenocarcinoma (dx. 2015) s/p whipple s/p gemcitabine (x 6 cycles) & RT (completed 2016) , s/p Xeloda presents for 2-week history of abdominal pain found to have right RP mass on CT likely recurrence of pancreatic CA vs. new malignancy c/b R. ureteral compression and hydronephrosis requiring ureteral stent.

## 2017-11-05 NOTE — PROGRESS NOTE ADULT - ATTENDING COMMENTS
Pt seen and examined, chart and labs reviewed.  Agree with resident note as above.  Pt with increasing pain, but only intermittently asks for IV Dilaudid.  Pain does improve with medications.  Plan for possible EUS guided biopsy tomorrow, will keep pt NPO in the event that GI is able to take patient tomorrow.  Case was discussed with IR already - technically very challenging for them to access the mass.

## 2017-11-06 LAB
BUN SERPL-MCNC: 14 MG/DL — SIGNIFICANT CHANGE UP (ref 7–23)
CALCIUM SERPL-MCNC: 8.6 MG/DL — SIGNIFICANT CHANGE UP (ref 8.4–10.5)
CHLORIDE SERPL-SCNC: 101 MMOL/L — SIGNIFICANT CHANGE UP (ref 98–107)
CO2 SERPL-SCNC: 26 MMOL/L — SIGNIFICANT CHANGE UP (ref 22–31)
CREAT SERPL-MCNC: 1.52 MG/DL — HIGH (ref 0.5–1.3)
GLUCOSE SERPL-MCNC: 87 MG/DL — SIGNIFICANT CHANGE UP (ref 70–99)
HCT VFR BLD CALC: 32.4 % — LOW (ref 39–50)
HGB BLD-MCNC: 10.4 G/DL — LOW (ref 13–17)
MAGNESIUM SERPL-MCNC: 2.1 MG/DL — SIGNIFICANT CHANGE UP (ref 1.6–2.6)
MCHC RBC-ENTMCNC: 29.8 PG — SIGNIFICANT CHANGE UP (ref 27–34)
MCHC RBC-ENTMCNC: 32.1 % — SIGNIFICANT CHANGE UP (ref 32–36)
MCV RBC AUTO: 92.8 FL — SIGNIFICANT CHANGE UP (ref 80–100)
NRBC # FLD: 0 — SIGNIFICANT CHANGE UP
PHOSPHATE SERPL-MCNC: 2.9 MG/DL — SIGNIFICANT CHANGE UP (ref 2.5–4.5)
PLATELET # BLD AUTO: 261 K/UL — SIGNIFICANT CHANGE UP (ref 150–400)
PMV BLD: 10.7 FL — SIGNIFICANT CHANGE UP (ref 7–13)
POTASSIUM SERPL-MCNC: 4.3 MMOL/L — SIGNIFICANT CHANGE UP (ref 3.5–5.3)
POTASSIUM SERPL-SCNC: 4.3 MMOL/L — SIGNIFICANT CHANGE UP (ref 3.5–5.3)
RBC # BLD: 3.49 M/UL — LOW (ref 4.2–5.8)
RBC # FLD: 13.1 % — SIGNIFICANT CHANGE UP (ref 10.3–14.5)
SODIUM SERPL-SCNC: 138 MMOL/L — SIGNIFICANT CHANGE UP (ref 135–145)
WBC # BLD: 3.42 K/UL — LOW (ref 3.8–10.5)
WBC # FLD AUTO: 3.42 K/UL — LOW (ref 3.8–10.5)

## 2017-11-06 PROCEDURE — 99232 SBSQ HOSP IP/OBS MODERATE 35: CPT | Mod: GC

## 2017-11-06 PROCEDURE — 99233 SBSQ HOSP IP/OBS HIGH 50: CPT

## 2017-11-06 RX ORDER — SODIUM CHLORIDE 9 MG/ML
1000 INJECTION INTRAMUSCULAR; INTRAVENOUS; SUBCUTANEOUS
Qty: 0 | Refills: 0 | Status: DISCONTINUED | OUTPATIENT
Start: 2017-11-06 | End: 2017-11-07

## 2017-11-06 RX ADMIN — HYDROMORPHONE HYDROCHLORIDE 1 MILLIGRAM(S): 2 INJECTION INTRAMUSCULAR; INTRAVENOUS; SUBCUTANEOUS at 06:10

## 2017-11-06 RX ADMIN — SODIUM CHLORIDE 100 MILLILITER(S): 9 INJECTION INTRAMUSCULAR; INTRAVENOUS; SUBCUTANEOUS at 18:25

## 2017-11-06 RX ADMIN — PIPERACILLIN AND TAZOBACTAM 25 GRAM(S): 4; .5 INJECTION, POWDER, LYOPHILIZED, FOR SOLUTION INTRAVENOUS at 05:55

## 2017-11-06 RX ADMIN — TAMSULOSIN HYDROCHLORIDE 0.4 MILLIGRAM(S): 0.4 CAPSULE ORAL at 22:03

## 2017-11-06 RX ADMIN — POLYETHYLENE GLYCOL 3350 17 GRAM(S): 17 POWDER, FOR SOLUTION ORAL at 05:56

## 2017-11-06 RX ADMIN — Medication 100 MILLIGRAM(S): at 05:54

## 2017-11-06 RX ADMIN — HYDROMORPHONE HYDROCHLORIDE 1 MILLIGRAM(S): 2 INJECTION INTRAMUSCULAR; INTRAVENOUS; SUBCUTANEOUS at 05:55

## 2017-11-06 RX ADMIN — SODIUM CHLORIDE 100 MILLILITER(S): 9 INJECTION INTRAMUSCULAR; INTRAVENOUS; SUBCUTANEOUS at 10:55

## 2017-11-06 RX ADMIN — PIPERACILLIN AND TAZOBACTAM 25 GRAM(S): 4; .5 INJECTION, POWDER, LYOPHILIZED, FOR SOLUTION INTRAVENOUS at 22:02

## 2017-11-06 RX ADMIN — SENNA PLUS 2 TABLET(S): 8.6 TABLET ORAL at 22:03

## 2017-11-06 RX ADMIN — SODIUM CHLORIDE 75 MILLILITER(S): 9 INJECTION INTRAMUSCULAR; INTRAVENOUS; SUBCUTANEOUS at 05:57

## 2017-11-06 RX ADMIN — HEPARIN SODIUM 5000 UNIT(S): 5000 INJECTION INTRAVENOUS; SUBCUTANEOUS at 22:02

## 2017-11-06 RX ADMIN — Medication 325 MILLIGRAM(S): at 13:09

## 2017-11-06 RX ADMIN — Medication 100 MILLIGRAM(S): at 13:09

## 2017-11-06 RX ADMIN — HEPARIN SODIUM 5000 UNIT(S): 5000 INJECTION INTRAVENOUS; SUBCUTANEOUS at 13:09

## 2017-11-06 RX ADMIN — HYDROMORPHONE HYDROCHLORIDE 1 MILLIGRAM(S): 2 INJECTION INTRAMUSCULAR; INTRAVENOUS; SUBCUTANEOUS at 21:59

## 2017-11-06 RX ADMIN — Medication 100 MILLIGRAM(S): at 22:02

## 2017-11-06 RX ADMIN — POLYETHYLENE GLYCOL 3350 17 GRAM(S): 17 POWDER, FOR SOLUTION ORAL at 17:36

## 2017-11-06 RX ADMIN — PIPERACILLIN AND TAZOBACTAM 25 GRAM(S): 4; .5 INJECTION, POWDER, LYOPHILIZED, FOR SOLUTION INTRAVENOUS at 13:08

## 2017-11-06 RX ADMIN — HEPARIN SODIUM 5000 UNIT(S): 5000 INJECTION INTRAVENOUS; SUBCUTANEOUS at 05:54

## 2017-11-06 RX ADMIN — HYDROMORPHONE HYDROCHLORIDE 1 MILLIGRAM(S): 2 INJECTION INTRAMUSCULAR; INTRAVENOUS; SUBCUTANEOUS at 22:14

## 2017-11-06 RX ADMIN — FINASTERIDE 5 MILLIGRAM(S): 5 TABLET, FILM COATED ORAL at 13:10

## 2017-11-06 NOTE — PROGRESS NOTE ADULT - PROBLEM SELECTOR PLAN 7
- Patient with enlarged prostate on CT A/P  - cont tamsulosin & finasteride  - Good urine outpt w/o bryson  - Will check urine studies but suspect elevated Cr 2/2 post- obstructive diuresis

## 2017-11-06 NOTE — PROGRESS NOTE ADULT - PROBLEM SELECTOR PLAN 3
- right RP mass with multiple arterially enhancing lesions at the hepatic dome found on CT A/P concerning for recurrence of pancreatic CA vs. new malignancy?   - CEA noted to be elevated to 6.3 in June  - will obtain tissue pathology per Heme/Onc recommendations - IR consulted however biopsy anticipated to technically challenging due to location   - GI initially deferred to IR, however GI will discuss with Radiology and advanced Endoscopy for EUS-guided biopsy today. Patient NPO since MN

## 2017-11-06 NOTE — PROGRESS NOTE ADULT - PROBLEM SELECTOR PLAN 5
- RP mass visualized on CT A/P with R hydronephrosis along concerning for metastatic disease?  - F/u Heme/onc recs, requesting tissue biopsy of RP mass  - Pain control for now adequate

## 2017-11-06 NOTE — PROGRESS NOTE ADULT - ATTENDING COMMENTS
Patient seen and examined. Discussed plan with patient and daughter at bedside.     Ongoing conversation w/ IR and GI in regards to the best and safest approach for RP mass biopsy.

## 2017-11-06 NOTE — PROGRESS NOTE ADULT - ASSESSMENT
72M with pancreatic adenocarcinoma (dx. 2015) s/p whipple s/p gemcitabine (x 6 cycles) & RT (completed 2016) , s/p Xeloda presents for 2-week history of abdominal pain found to have right RP mass on CT likely recurrence of pancreatic CA vs. new malignancy c/b R. ureteral compression and hydronephrosis requiring ureteral stent. 72M with pancreatic adenocarcinoma (dx. 2015) s/p whipple s/p gemcitabine (x 6 cycles) & RT (completed 2016) , s/p Xeloda presents for 2-week history of abdominal pain found to have right RP mass on CT likely recurrence of pancreatic CA vs. new malignancy c/b R. ureteral compression , hydronephrosis  sepsis 2/2 pyelo requiring urgent ureteral stent placement by urology for decompression.

## 2017-11-06 NOTE — PROGRESS NOTE ADULT - PROBLEM SELECTOR PLAN 2
- Patient with hydronephrosis of R kidney seen on CT A/P 2/2 right RP mass  - s/p ureteral stent placement with Ludwig on 11/2 by Urology  - cont tamsulosin & finasteride for likely BPH given enlarged prostate on CT A/P  - cont monitor UOP, Out 1600 yesterday - Patient with hydronephrosis of R kidney seen on CT A/P 2/2 right RP mass  - s/p ureteral stent placement with Ludwig on 11/2 by Urology. Ludwig now dcd  - cont tamsulosin & finasteride for likely BPH given enlarged prostate on CT A/P  - cont monitor UOP, Out 1600 yesterday

## 2017-11-06 NOTE — PROGRESS NOTE ADULT - PROBLEM SELECTOR PLAN 1
- febrile to 101.8 on 11/2; now afebrile for 72 hours with urine cx. & blood cx. showing NGTD  - possibly secondary to ureteral compression in setting of new right RP mass  now s/p stent  - cont Zosyn q8; length of therapy TBD, anticipate patient will require at least 10 days total of ABX however w/o organism on Urine/Blood difficult to truly quantify duration.   -  Ludwig d/c'ed 11/4 given afebrile > 24hrs; post void-residual on bladder scan- 107  - outpatient Urology follow up for stent exchange in 3 months Febrile to 101.8 on 11/2 with associated CVA tenderness; now afebrile for 72 hours with urine cx. & blood cx. showing NGTD  - possibly secondary to ureteral compression in setting of new right RP mass  now s/p stent  - cont Zosyn q8 (day 4); length of therapy TBD, anticipate patient will require at least 10 days total of ABX however w/o organism on Urine/Blood difficult to truly quantify duration.   -  Ludwig d/c'ed 11/4 given afebrile > 24hrs; post void-residual on bladder scan- 107  - outpatient Urology follow up for stent exchange in 3 months Pn admission febrile to 102 , w/ HR >90 amd + CVA tenderness; now afebrile for 72 hours with urine cx & blood cx NGTD  - possibly secondary to ureteral compression in setting of new right RP mass  now s/p stent  - cont Zosyn q8 (day 4); length of therapy TBD, anticipate patient will require at least 10 days total of ABX however w/o organism on Urine/Blood difficult to truly quantify duration.   -  Ludwig d/c'ed 11/4 given afebrile > 24hrs; post void-residual on bladder scan- 107  - outpatient Urology follow up for stent exchange in 3 months

## 2017-11-06 NOTE — CHART NOTE - NSCHARTNOTEFT_GEN_A_CORE
Advanced Endoscopy Chart Note    Imaging reviewed with advanced endoscopy attending (Dr.Larry Rodriguez) and radiology. Technically difficult obtaining EUS guided biopsy of retroperitoneal mass given Whipple anatomy. There appears to be a good window for CT guided biopsy - would discuss with IR team.     Arvind Mancia  GI Fellow

## 2017-11-06 NOTE — PROGRESS NOTE ADULT - PROBLEM SELECTOR PLAN 6
- likely multifactorial 2/2 RP mass causing hydronephrosis vs. BPH vs. pre-renal etiology in setting of poor PO intake/sepsis  -  Currently off IV fluids, urine output acceptable. Negative 1.6L in 24hrs, Prior 24hrs patient was -1.85L   - Cr still elevated but downtrending, on IVF as patient having post obstruction diuresis - likely multifactorial 2/2 RP mass causing hydronephrosis vs. BPH vs. pre-renal etiology in setting of poor PO intake/sepsis  - Cr still elevated to 1.52 this AM (increased from 1.38 yesterday) , on IVF as patient having post obstruction diuresis  - Increased IVF rate from 75 to 100 as patient is possibly not keeping up with demand; I&Os - negative 1.6L in 24hrs, Prior 24hrs patient was negative 1.85L - likely multifactorial 2/2 RP mass causing hydronephrosis vs. BPH vs. pre-renal etiology in setting of poor PO intake/sepsis/ post obstructive diuresis  vs contrast induced (on 11/2)  - Cr still elevated to 1.52 this AM (increased from 1.38 yesterday) , on IVF as patient having post obstruction diuresis  - Increased IVF rate from 75 to 100 as patient is possibly not keeping up with demand; I&Os - negative 1.6L in 24hrs, Prior 24hrs patient was negative 1.85L

## 2017-11-06 NOTE — PROGRESS NOTE ADULT - PROBLEM SELECTOR PLAN 4
- complicated by epigastric pain radiating to RUQ - Now improved, patient has mild RLQ which is well controlled on PRN, Patient has taken 4 Dilaudid PRN's in 24hrs  - will monitor pain response  - Tylenol PRN for mild to moderate pain  - Dilaudid 0.5 IV q6 PRN for severe pain   - no bowel movement for 4 days without significant stool burden seen on CT A/P; cont bowel regimen and monitor

## 2017-11-06 NOTE — PROGRESS NOTE ADULT - SUBJECTIVE AND OBJECTIVE BOX
Patient is a 72y old  Male who presents with a chief complaint of Came for complaints of Epigastric Pain radiating from the right side Abdomen to the left side, no nausea or vomiting (05 Nov 2017 09:51)      SUBJECTIVE / OVERNIGHT EVENTS: Patient had no acute issues overnight and feels comfortable this morning with no reports of any pain. Denies fevers, chills, n/v/d/c.    MEDICATIONS  (STANDING):  docusate sodium 100 milliGRAM(s) Oral three times a day  ferrous    sulfate 325 milliGRAM(s) Oral daily  finasteride 5 milliGRAM(s) Oral daily  heparin  Injectable 5000 Unit(s) SubCutaneous every 8 hours  piperacillin/tazobactam IVPB. 3.375 Gram(s) IV Intermittent every 8 hours  polyethylene glycol 3350 17 Gram(s) Oral two times a day  senna 2 Tablet(s) Oral at bedtime  sodium chloride 0.9%. 1000 milliLiter(s) (75 mL/Hr) IV Continuous <Continuous>  tamsulosin 0.4 milliGRAM(s) Oral at bedtime    MEDICATIONS  (PRN):  acetaminophen   Tablet 650 milliGRAM(s) Oral every 6 hours PRN For Temp greater than 38 C (100.4 F)  acetaminophen   Tablet. 650 milliGRAM(s) Oral every 6 hours PRN Mild to moderate pain 1-5  HYDROmorphone  Injectable 1 milliGRAM(s) IV Push every 4 hours PRN For moderate to severe pain  simethicone 80 milliGRAM(s) Chew every 6 hours PRN Dyspepsia        CAPILLARY BLOOD GLUCOSE        I&O's Summary    05 Nov 2017 07:01  -  06 Nov 2017 07:00  --------------------------------------------------------  IN: 0 mL / OUT: 1600 mL / NET: -1600 mL    06 Nov 2017 07:01  -  06 Nov 2017 08:09  --------------------------------------------------------  IN: 0 mL / OUT: 200 mL / NET: -200 mL        PHYSICAL EXAM:  VS: T 98.3, HR 80, /79, RR 18, SpO2 97% on RA  Constitutional: NAD, awake and alert  EYES: EOMI  ENT:  Normal Hearing, no tonsillar exudates   Neck: Soft and supple, No JVD  Respiratory: Breath sounds are clear bilaterally, No wheezing, rales or rhonchi  Cardiovascular: S1 and S2, regular rate and rhythm, no Murmurs, gallops or rubs  Gastrointestinal: Bowel Sounds present, soft, +RLQ TTP, No CVA tenderness or flank pain. Chevron scar well healed, nondistended, no guarding, no rebound  Extremities: No cyanosis or clubbing; warm to touch  Vascular: 2+ peripheral pulses lower ex  Neurological: A/O x 3, no focal deficits  Musculoskeletal: 5/5 strength b/l upper and lower extremities  Skin: No rashes, warm & dry  Psych: no depression or anhedonia  HEME: no bruises, no nose bleeds        LABS:                        10.4   3.42  )-----------( 261      ( 06 Nov 2017 06:30 )             32.4     11-05    134<L>  |  97<L>  |  12  ----------------------------<  113<H>  4.0   |  23  |  1.38<H>    Ca    8.6      05 Nov 2017 06:50                RADIOLOGY & ADDITIONAL TESTS:    Imaging Personally Reviewed:    Consultant(s) Notes Reviewed:      Care Discussed with Consultants/Other Providers:

## 2017-11-07 ENCOUNTER — RESULT REVIEW (OUTPATIENT)
Age: 72
End: 2017-11-07

## 2017-11-07 LAB
BACTERIA BLD CULT: SIGNIFICANT CHANGE UP
BACTERIA BLD CULT: SIGNIFICANT CHANGE UP
BUN SERPL-MCNC: 14 MG/DL — SIGNIFICANT CHANGE UP (ref 7–23)
CALCIUM SERPL-MCNC: 8.5 MG/DL — SIGNIFICANT CHANGE UP (ref 8.4–10.5)
CHLORIDE SERPL-SCNC: 103 MMOL/L — SIGNIFICANT CHANGE UP (ref 98–107)
CO2 SERPL-SCNC: 24 MMOL/L — SIGNIFICANT CHANGE UP (ref 22–31)
CREAT SERPL-MCNC: 1.41 MG/DL — HIGH (ref 0.5–1.3)
GLUCOSE SERPL-MCNC: 94 MG/DL — SIGNIFICANT CHANGE UP (ref 70–99)
HCT VFR BLD CALC: 32.1 % — LOW (ref 39–50)
HGB BLD-MCNC: 10.7 G/DL — LOW (ref 13–17)
MAGNESIUM SERPL-MCNC: 2.1 MG/DL — SIGNIFICANT CHANGE UP (ref 1.6–2.6)
MCHC RBC-ENTMCNC: 30.2 PG — SIGNIFICANT CHANGE UP (ref 27–34)
MCHC RBC-ENTMCNC: 33.3 % — SIGNIFICANT CHANGE UP (ref 32–36)
MCV RBC AUTO: 90.7 FL — SIGNIFICANT CHANGE UP (ref 80–100)
NRBC # FLD: 0 — SIGNIFICANT CHANGE UP
PHOSPHATE SERPL-MCNC: 2.7 MG/DL — SIGNIFICANT CHANGE UP (ref 2.5–4.5)
PLATELET # BLD AUTO: 256 K/UL — SIGNIFICANT CHANGE UP (ref 150–400)
PMV BLD: 10.2 FL — SIGNIFICANT CHANGE UP (ref 7–13)
POTASSIUM SERPL-MCNC: 4.4 MMOL/L — SIGNIFICANT CHANGE UP (ref 3.5–5.3)
POTASSIUM SERPL-SCNC: 4.4 MMOL/L — SIGNIFICANT CHANGE UP (ref 3.5–5.3)
RBC # BLD: 3.54 M/UL — LOW (ref 4.2–5.8)
RBC # FLD: 12.9 % — SIGNIFICANT CHANGE UP (ref 10.3–14.5)
SODIUM SERPL-SCNC: 138 MMOL/L — SIGNIFICANT CHANGE UP (ref 135–145)
WBC # BLD: 5.6 K/UL — SIGNIFICANT CHANGE UP (ref 3.8–10.5)
WBC # FLD AUTO: 5.6 K/UL — SIGNIFICANT CHANGE UP (ref 3.8–10.5)

## 2017-11-07 PROCEDURE — 99233 SBSQ HOSP IP/OBS HIGH 50: CPT

## 2017-11-07 PROCEDURE — 43239 EGD BIOPSY SINGLE/MULTIPLE: CPT | Mod: 59,GC

## 2017-11-07 PROCEDURE — 88305 TISSUE EXAM BY PATHOLOGIST: CPT | Mod: 26

## 2017-11-07 PROCEDURE — 43259 EGD US EXAM DUODENUM/JEJUNUM: CPT | Mod: GC

## 2017-11-07 RX ORDER — SODIUM CHLORIDE 9 MG/ML
1000 INJECTION INTRAMUSCULAR; INTRAVENOUS; SUBCUTANEOUS
Qty: 0 | Refills: 0 | Status: DISCONTINUED | OUTPATIENT
Start: 2017-11-07 | End: 2017-11-08

## 2017-11-07 RX ADMIN — POLYETHYLENE GLYCOL 3350 17 GRAM(S): 17 POWDER, FOR SOLUTION ORAL at 17:33

## 2017-11-07 RX ADMIN — HYDROMORPHONE HYDROCHLORIDE 1 MILLIGRAM(S): 2 INJECTION INTRAMUSCULAR; INTRAVENOUS; SUBCUTANEOUS at 14:47

## 2017-11-07 RX ADMIN — Medication 100 MILLIGRAM(S): at 22:15

## 2017-11-07 RX ADMIN — Medication 100 MILLIGRAM(S): at 14:31

## 2017-11-07 RX ADMIN — SENNA PLUS 2 TABLET(S): 8.6 TABLET ORAL at 22:02

## 2017-11-07 RX ADMIN — HYDROMORPHONE HYDROCHLORIDE 1 MILLIGRAM(S): 2 INJECTION INTRAMUSCULAR; INTRAVENOUS; SUBCUTANEOUS at 22:15

## 2017-11-07 RX ADMIN — HYDROMORPHONE HYDROCHLORIDE 1 MILLIGRAM(S): 2 INJECTION INTRAMUSCULAR; INTRAVENOUS; SUBCUTANEOUS at 22:03

## 2017-11-07 RX ADMIN — HEPARIN SODIUM 5000 UNIT(S): 5000 INJECTION INTRAVENOUS; SUBCUTANEOUS at 05:09

## 2017-11-07 RX ADMIN — HEPARIN SODIUM 5000 UNIT(S): 5000 INJECTION INTRAVENOUS; SUBCUTANEOUS at 22:02

## 2017-11-07 RX ADMIN — Medication 100 MILLIGRAM(S): at 05:09

## 2017-11-07 RX ADMIN — FINASTERIDE 5 MILLIGRAM(S): 5 TABLET, FILM COATED ORAL at 14:32

## 2017-11-07 RX ADMIN — HYDROMORPHONE HYDROCHLORIDE 1 MILLIGRAM(S): 2 INJECTION INTRAMUSCULAR; INTRAVENOUS; SUBCUTANEOUS at 05:10

## 2017-11-07 RX ADMIN — Medication 325 MILLIGRAM(S): at 14:31

## 2017-11-07 RX ADMIN — PIPERACILLIN AND TAZOBACTAM 25 GRAM(S): 4; .5 INJECTION, POWDER, LYOPHILIZED, FOR SOLUTION INTRAVENOUS at 14:32

## 2017-11-07 RX ADMIN — TAMSULOSIN HYDROCHLORIDE 0.4 MILLIGRAM(S): 0.4 CAPSULE ORAL at 22:02

## 2017-11-07 RX ADMIN — HYDROMORPHONE HYDROCHLORIDE 1 MILLIGRAM(S): 2 INJECTION INTRAMUSCULAR; INTRAVENOUS; SUBCUTANEOUS at 05:25

## 2017-11-07 RX ADMIN — PIPERACILLIN AND TAZOBACTAM 25 GRAM(S): 4; .5 INJECTION, POWDER, LYOPHILIZED, FOR SOLUTION INTRAVENOUS at 05:10

## 2017-11-07 RX ADMIN — HEPARIN SODIUM 5000 UNIT(S): 5000 INJECTION INTRAVENOUS; SUBCUTANEOUS at 14:31

## 2017-11-07 RX ADMIN — POLYETHYLENE GLYCOL 3350 17 GRAM(S): 17 POWDER, FOR SOLUTION ORAL at 05:10

## 2017-11-07 RX ADMIN — HYDROMORPHONE HYDROCHLORIDE 1 MILLIGRAM(S): 2 INJECTION INTRAMUSCULAR; INTRAVENOUS; SUBCUTANEOUS at 14:32

## 2017-11-07 RX ADMIN — SODIUM CHLORIDE 100 MILLILITER(S): 9 INJECTION INTRAMUSCULAR; INTRAVENOUS; SUBCUTANEOUS at 17:32

## 2017-11-07 NOTE — PROGRESS NOTE ADULT - PROBLEM SELECTOR PLAN 2
- Patient with hydronephrosis of R kidney seen on CT A/P 2/2 right RP mass  - s/p ureteral stent placement with Ludwig on 11/2 by Urology. Ludwig now dcd  - c/w tamsulosin & finasteride for likely BPH given enlarged prostate on CT A/P  - cont to monitor UOP, Out 1000 yesterday - Patient with hydronephrosis of R kidney seen on CT A/P 2/2 right RP mass  - s/p ureteral stent placement with Ludwig on 11/2 by Urology. Ludwig now dcd  - c/w tamsulosin & finasteride for likely BPH given enlarged prostate on CT A/P  - cont to monitor UOP, Out 1700 over past 24 hours Patient with hydronephrosis of R kidney seen on CT A/P 2/2 right RP mass  - s/p ureteral stent placement with Ludwig on 11/2 by Urology. Ludwig now dcd  - c/w tamsulosin & finasteride for likely BPH given enlarged prostate on CT A/P  - cont to monitor UOP, Out 1700 over past 24 hours Patient with hydronephrosis of R kidney seen on CT A/P 2/2 right RP mass  - s/p ureteral stent placement with Ludwig on 11/2 by Urology. Ludwig now dcd  - c/w tamsulosin & finasteride for likely BPH given enlarged prostate on CT A/P  - cont to monitor UOP, Out 1900 over past 24 hours

## 2017-11-07 NOTE — PROGRESS NOTE ADULT - PROBLEM SELECTOR PLAN 1
Pn admission febrile to 102 , w/ HR >90 amd + CVA tenderness; now afebrile for 72 hours with urine cx & blood cx NGTD  - possibly secondary to ureteral compression in setting of new right RP mass  now s/p stent  - cont Zosyn q8 (day 5); length of therapy TBD, anticipate patient will require at least 10 days total of ABX however w/o organism on Urine/Blood difficult to truly quantify duration.   -  Ludwig d/c'ed 11/4 given afebrile > 24hrs; post void-residual on bladder scans - consistently in low 100s  - outpatient Urology follow up for stent exchange in 3 months On admission febrile to 102 , w/ HR >90 amd + CVA tenderness; now has been afebrile with urine cx & blood cx NGTD  - possibly secondary to ureteral compression in setting of new right RP mass  now s/p stent  - cont Zosyn q8 (day 5); length of therapy TBD,   -  Ludwig d/c'ed 11/4 given afebrile > 24hrs; post void-residual on bladder scans - consistently in low 100s  - outpatient Urology follow up for stent exchange in 3 months On admission febrile to 102 , w/ HR >90 amd + CVA tenderness; now has been afebrile with urine cx & blood cx NGTD  - possibly secondary to ureteral compression in setting of new right RP mass  now s/p stent  - completed 5 day zosyn course yesterday  -  Ludwig d/c'ed 11/4 given afebrile > 24hrs; post void-residual on bladder scans - consistently in low 100s  - outpatient Urology follow up for stent exchange in 3 months

## 2017-11-07 NOTE — PROGRESS NOTE ADULT - PROBLEM SELECTOR PLAN 8
- 3.5 x 3.3 cm infrarenal aortic aneurysm  - cont monitor outpatient  - Outpatient f/u 3.5 x 3.3 cm infrarenal aortic aneurysm  - cont monitor   - Outpatient f/u

## 2017-11-07 NOTE — PROGRESS NOTE ADULT - SUBJECTIVE AND OBJECTIVE BOX
Patient is a 72y old  Male who presents with a chief complaint of Came for complaints of Epigastric Pain radiating from the right side Abdomen to the left side, no nausea or vomiting (05 Nov 2017 09:51)      SUBJECTIVE / OVERNIGHT EVENTS:    MEDICATIONS  (STANDING):  docusate sodium 100 milliGRAM(s) Oral three times a day  ferrous    sulfate 325 milliGRAM(s) Oral daily  finasteride 5 milliGRAM(s) Oral daily  heparin  Injectable 5000 Unit(s) SubCutaneous every 8 hours  piperacillin/tazobactam IVPB. 3.375 Gram(s) IV Intermittent every 8 hours  polyethylene glycol 3350 17 Gram(s) Oral two times a day  senna 2 Tablet(s) Oral at bedtime  sodium chloride 0.9%. 1000 milliLiter(s) (100 mL/Hr) IV Continuous <Continuous>  tamsulosin 0.4 milliGRAM(s) Oral at bedtime    MEDICATIONS  (PRN):  acetaminophen   Tablet 650 milliGRAM(s) Oral every 6 hours PRN For Temp greater than 38 C (100.4 F)  acetaminophen   Tablet. 650 milliGRAM(s) Oral every 6 hours PRN Mild to moderate pain 1-5  HYDROmorphone  Injectable 1 milliGRAM(s) IV Push every 4 hours PRN For moderate to severe pain  simethicone 80 milliGRAM(s) Chew every 6 hours PRN Dyspepsia        CAPILLARY BLOOD GLUCOSE        I&O's Summary    05 Nov 2017 07:01  -  06 Nov 2017 07:00  --------------------------------------------------------  IN: 0 mL / OUT: 1600 mL / NET: -1600 mL    06 Nov 2017 07:01  -  07 Nov 2017 06:53  --------------------------------------------------------  IN: 1200 mL / OUT: 1100 mL / NET: 100 mL        PHYSICAL EXAM:  VS: T 98.1, HR 72, /74, RR 18, SpO2 99% on RA  Constitutional: NAD, awake and alert  EYES: EOMI  ENT:  Normal Hearing, no tonsillar exudates   Neck: Soft and supple, No JVD  Respiratory: Breath sounds are clear bilaterally, No wheezing, rales or rhonchi  Cardiovascular: S1 and S2, regular rate and rhythm, no Murmurs, gallops or rubs  Gastrointestinal: Bowel Sounds present, soft, +RLQ TTP, No CVA tenderness or flank pain. Chevron scar well healed, nondistended, no guarding, no rebound  Extremities: No cyanosis or clubbing; warm to touch  Vascular: 2+ peripheral pulses lower ex  Neurological: A/O x 3, no focal deficits  Musculoskeletal: 5/5 strength b/l upper and lower extremities  Skin: No rashes, warm & dry  Psych: no depression or anhedonia  HEME: no bruises, no nose bleeds      LABS:                        10.7   5.60  )-----------( 256      ( 07 Nov 2017 04:50 )             32.1     11-07    138  |  103  |  14  ----------------------------<  94  4.4   |  24  |  1.41<H>    Ca    8.5      07 Nov 2017 04:50  Phos  2.7     11-07  Mg     2.1     11-07                RADIOLOGY & ADDITIONAL TESTS:    No new additional studies. Patient is a 72y old  Male who presents with a chief complaint of Came for complaints of Epigastric Pain radiating from the right side Abdomen to the left side, no nausea or vomiting (05 Nov 2017 09:51)      SUBJECTIVE / OVERNIGHT EVENTS: Patient states that he feels well this AM. No complaints of any pain or discomfort this morning.     MEDICATIONS  (STANDING):  docusate sodium 100 milliGRAM(s) Oral three times a day  ferrous    sulfate 325 milliGRAM(s) Oral daily  finasteride 5 milliGRAM(s) Oral daily  heparin  Injectable 5000 Unit(s) SubCutaneous every 8 hours  piperacillin/tazobactam IVPB. 3.375 Gram(s) IV Intermittent every 8 hours  polyethylene glycol 3350 17 Gram(s) Oral two times a day  senna 2 Tablet(s) Oral at bedtime  sodium chloride 0.9%. 1000 milliLiter(s) (100 mL/Hr) IV Continuous <Continuous>  tamsulosin 0.4 milliGRAM(s) Oral at bedtime    MEDICATIONS  (PRN):  acetaminophen   Tablet 650 milliGRAM(s) Oral every 6 hours PRN For Temp greater than 38 C (100.4 F)  acetaminophen   Tablet. 650 milliGRAM(s) Oral every 6 hours PRN Mild to moderate pain 1-5  HYDROmorphone  Injectable 1 milliGRAM(s) IV Push every 4 hours PRN For moderate to severe pain  simethicone 80 milliGRAM(s) Chew every 6 hours PRN Dyspepsia        CAPILLARY BLOOD GLUCOSE        I&O's Summary    05 Nov 2017 07:01  -  06 Nov 2017 07:00  --------------------------------------------------------  IN: 0 mL / OUT: 1600 mL / NET: -1600 mL    06 Nov 2017 07:01  -  07 Nov 2017 06:53  --------------------------------------------------------  IN: 1200 mL / OUT: 1100 mL / NET: 100 mL        PHYSICAL EXAM:  VS: T 98.1, HR 72, /74, RR 18, SpO2 99% on RA  Constitutional: NAD, awake and alert  EYES: EOMI  ENT:  Normal Hearing, no tonsillar exudates   Neck: Soft and supple, No JVD  Respiratory: Breath sounds are clear bilaterally, No wheezing, rales or rhonchi  Cardiovascular: S1 and S2, regular rate and rhythm, no Murmurs, gallops or rubs  Gastrointestinal: Bowel Sounds present, soft, +RLQ TTP, No CVA tenderness or flank pain. Chevron scar well healed, nondistended, no guarding, no rebound  Extremities: No cyanosis or clubbing; warm to touch  Vascular: 2+ peripheral pulses lower ex  Neurological: A/O x 3, no focal deficits  Musculoskeletal: 5/5 strength b/l upper and lower extremities  Skin: No rashes, warm & dry  Psych: no depression or anhedonia  HEME: no bruises, no nose bleeds      LABS:                        10.7   5.60  )-----------( 256      ( 07 Nov 2017 04:50 )             32.1     11-07    138  |  103  |  14  ----------------------------<  94  4.4   |  24  |  1.41<H>    Ca    8.5      07 Nov 2017 04:50  Phos  2.7     11-07  Mg     2.1     11-07                RADIOLOGY & ADDITIONAL TESTS:    No new additional studies.

## 2017-11-07 NOTE — PROGRESS NOTE ADULT - PROBLEM SELECTOR PLAN 7
- Patient with enlarged prostate on CT A/P  - cont tamsulosin & finasteride  - Good urine outpt w/o bryson  - Will check urine studies but suspect elevated Cr 2/2 post- obstructive diuresis - Patient with enlarged prostate on CT A/P  - cont tamsulosin & finasteride  - Good urine outpt w/o bryson

## 2017-11-07 NOTE — PROGRESS NOTE ADULT - PROBLEM SELECTOR PLAN 5
- RP mass visualized on CT A/P with R hydronephrosis along concerning for metastatic disease?  - F/u Heme/onc recs, requesting tissue biopsy of RP mass  - Pain control for now adequate RP mass visualized on CT A/P with R hydronephrosis   - F/u Heme/onc recs, requesting tissue biopsy of RP mass  - Pain control for now adequate RP mass visualized on CT A/P with R hydronephrosis   - F/u Heme/onc recs, IR to biopsy RP mass tomorrow  - Pain control for now adequate

## 2017-11-07 NOTE — PROGRESS NOTE ADULT - PROBLEM SELECTOR PLAN 3
- right RP mass with multiple arterially enhancing lesions at the hepatic dome found on CT A/P concerning for recurrence of pancreatic CA vs. new malignancy?   - CEA noted to be elevated to 6.3 in June  - will obtain tissue pathology per Heme/Onc recommendations - IR consulted however biopsy anticipated to technically challenging due to location   - GI initially deferred to IR, however GI will discuss with Radiology and advanced Endoscopy for EUS-guided biopsy today. Patient NPO since MN - right RP mass with multiple arterially enhancing lesions at the hepatic dome found on CT A/P concerning for recurrence of pancreatic CA vs. new malignancy?   - CEA noted to be elevated to 6.3 in June  - will obtain tissue pathology per Heme/Onc recommendations - IR consulted however biopsy anticipated to technically challenging due to location   - GI to perform biopsy . Patient NPO since MN - right RP mass with multiple arterially enhancing lesions at the hepatic dome found on CT A/P concerning for recurrence of pancreatic CA vs. new malignancy?   - CEA noted to be elevated to 6.3 in June  - will obtain tissue pathology per Heme/Onc recommendations - IR consulted however biopsy anticipated to technically challenging due to location   - GI to perform RP biopsy today. Patient NPO since MN Right RP mass with multiple arterially enhancing lesions at the hepatic dome found on CT A/P concerning for recurrence of pancreatic CA vs. new malignancy?   - CEA noted to be elevated to 6.3 in June  - will obtain tissue pathology per Heme/Onc recommendations - IR consulted however biopsy anticipated to technically challenging due to location   - GI to perform RP biopsy today. Patient NPO since MN Right RP mass with multiple arterially enhancing lesions at the hepatic dome found on CT A/P concerning for recurrence of pancreatic CA vs. new malignancy?   - CEA noted to be elevated to 6.3 in June  - will obtain tissue pathology per Heme/Onc recommendations   - GI unable to access RP mass yesterday  - IR to perform RP biopsy tomorrow  - Patient NPO at MN

## 2017-11-07 NOTE — PROGRESS NOTE ADULT - PROBLEM SELECTOR PLAN 6
- likely multifactorial 2/2 RP mass causing hydronephrosis vs. BPH vs. pre-renal etiology in setting of poor PO intake/sepsis/ post obstructive diuresis  vs contrast induced (on 11/2)  - Cr still elevated to 1.52 this AM (increased from 1.38 yesterday) , on IVF as patient having post obstruction diuresis  - Increased IVF rate from 75 to 100 as patient is possibly not keeping up with demand; I&Os - negative 1.6L in 24hrs, Prior 24hrs patient was negative 1.85L - likely multifactorial 2/2 RP mass causing hydronephrosis vs. BPH vs. pre-renal etiology in setting of poor PO intake/sepsis/ post obstructive diuresis  vs contrast induced (on 11/2)  - Cr still elevated to 1.52 this AM (increased from 1.38 yesterday) , on IVF as patient having post obstruction diuresis  - c/w IVF @ 100 as patient is possibly not keeping up with demand and responding well to increased rate as crt slightly downtrending this AM; I&Os - negative 1.7L in 24hrs, Prior 24hrs patient was negative 1.6L likely multifactorial 2/2 RP mass causing hydronephrosis vs. BPH vs. pre-renal etiology in setting of poor PO intake/sepsis/ post obstructive diuresis  vs contrast induced (on 11/2). Now improving on todays labs.    - c/w IVF @ 100 for now   - Continue to monitor I and IOs likely multifactorial 2/2 RP mass causing hydronephrosis vs. BPH vs. pre-renal etiology in setting of poor PO intake/sepsis/ post obstructive diuresis  vs contrast induced (on 11/2). Crt now improving to 1.2 from 1.4 yesterday    - c/w IVF @ 100 for now; decrease to 75 tonight  - Continue to monitor I and IOs

## 2017-11-07 NOTE — PROGRESS NOTE ADULT - PROBLEM SELECTOR PLAN 4
- complicated by epigastric pain radiating to RUQ - Now improved, patient has mild RLQ which is well controlled on PRN, Patient has taken 3 Dilaudid PRN's in 24hrs  - will monitor pain response  - Tylenol PRN for mild to moderate pain  - Dilaudid 0.5 IV q6 PRN for severe pain   - no bowel movement for 5 days without significant stool burden seen on CT A/P; cont bowel regimen and monitor Complicated by epigastric pain radiating to RUQ - Now improved, patient has mild RLQ which is well controlled on PRN, Patient has taken 3 Dilaudid PRN's in 24hrs  - will monitor pain response  - Tylenol PRN for mild to moderate pain  - Dilaudid 0.5 IV q6 PRN for severe pain   - no bowel movement for 5 days without significant stool burden seen on CT A/P; cont bowel regimen and monitor Complicated by epigastric pain radiating to RUQ - Now improved, patient has mild RLQ which is well controlled on PRN, Patient has taken 4mg Dilaudid IV PRN's in 24hrs  - will monitor pain response  - Tylenol PRN for mild to moderate pain  - Dilaudid 0.5 IV q6 PRN for severe pain   - last BM yesterday was loose and brown - cont bowel regimen and monitor

## 2017-11-07 NOTE — PROGRESS NOTE ADULT - ASSESSMENT
72M with pancreatic adenocarcinoma (dx. 2015) s/p whipple s/p gemcitabine (x 6 cycles) & RT (completed 2016) , s/p Xeloda presents for 2-week history of abdominal pain found to have right RP mass on CT likely recurrence of pancreatic CA vs. new malignancy c/b R. ureteral compression , hydronephrosis  sepsis 2/2 pyelo requiring urgent ureteral stent placement by urology for decompression.

## 2017-11-07 NOTE — PROGRESS NOTE ADULT - ATTENDING COMMENTS
Patient seen and examined.     Ongoing conversation w/ IR and GI in regards to the best and safest approach for RP mass biopsy. GI attempted biopsy today but was unable to visualize area and safely obtain biopsy. Case further discussed with IR today w/ plan now for IR to attempt to obtain biopsy on Thursday. Patient to be NPO after midnight tomorrow.

## 2017-11-08 LAB
BASOPHILS # BLD AUTO: 0.05 K/UL — SIGNIFICANT CHANGE UP (ref 0–0.2)
BASOPHILS NFR BLD AUTO: 1.2 % — SIGNIFICANT CHANGE UP (ref 0–2)
BUN SERPL-MCNC: 14 MG/DL — SIGNIFICANT CHANGE UP (ref 7–23)
CALCIUM SERPL-MCNC: 8.7 MG/DL — SIGNIFICANT CHANGE UP (ref 8.4–10.5)
CHLORIDE SERPL-SCNC: 100 MMOL/L — SIGNIFICANT CHANGE UP (ref 98–107)
CO2 SERPL-SCNC: 24 MMOL/L — SIGNIFICANT CHANGE UP (ref 22–31)
CREAT SERPL-MCNC: 1.22 MG/DL — SIGNIFICANT CHANGE UP (ref 0.5–1.3)
EOSINOPHIL # BLD AUTO: 0.18 K/UL — SIGNIFICANT CHANGE UP (ref 0–0.5)
EOSINOPHIL NFR BLD AUTO: 4.2 % — SIGNIFICANT CHANGE UP (ref 0–6)
GLUCOSE SERPL-MCNC: 91 MG/DL — SIGNIFICANT CHANGE UP (ref 70–99)
HCT VFR BLD CALC: 33.3 % — LOW (ref 39–50)
HGB BLD-MCNC: 10.9 G/DL — LOW (ref 13–17)
IMM GRANULOCYTES # BLD AUTO: 0.01 # — SIGNIFICANT CHANGE UP
IMM GRANULOCYTES NFR BLD AUTO: 0.2 % — SIGNIFICANT CHANGE UP (ref 0–1.5)
LYMPHOCYTES # BLD AUTO: 0.73 K/UL — LOW (ref 1–3.3)
LYMPHOCYTES # BLD AUTO: 17.1 % — SIGNIFICANT CHANGE UP (ref 13–44)
MAGNESIUM SERPL-MCNC: 2 MG/DL — SIGNIFICANT CHANGE UP (ref 1.6–2.6)
MCHC RBC-ENTMCNC: 29.8 PG — SIGNIFICANT CHANGE UP (ref 27–34)
MCHC RBC-ENTMCNC: 32.7 % — SIGNIFICANT CHANGE UP (ref 32–36)
MCV RBC AUTO: 91 FL — SIGNIFICANT CHANGE UP (ref 80–100)
MONOCYTES # BLD AUTO: 0.35 K/UL — SIGNIFICANT CHANGE UP (ref 0–0.9)
MONOCYTES NFR BLD AUTO: 8.2 % — SIGNIFICANT CHANGE UP (ref 2–14)
NEUTROPHILS # BLD AUTO: 2.96 K/UL — SIGNIFICANT CHANGE UP (ref 1.8–7.4)
NEUTROPHILS NFR BLD AUTO: 69.1 % — SIGNIFICANT CHANGE UP (ref 43–77)
NRBC # FLD: 0 — SIGNIFICANT CHANGE UP
PHOSPHATE SERPL-MCNC: 2.7 MG/DL — SIGNIFICANT CHANGE UP (ref 2.5–4.5)
PLATELET # BLD AUTO: 251 K/UL — SIGNIFICANT CHANGE UP (ref 150–400)
PMV BLD: 10.6 FL — SIGNIFICANT CHANGE UP (ref 7–13)
POTASSIUM SERPL-MCNC: 4 MMOL/L — SIGNIFICANT CHANGE UP (ref 3.5–5.3)
POTASSIUM SERPL-SCNC: 4 MMOL/L — SIGNIFICANT CHANGE UP (ref 3.5–5.3)
RBC # BLD: 3.66 M/UL — LOW (ref 4.2–5.8)
RBC # FLD: 13 % — SIGNIFICANT CHANGE UP (ref 10.3–14.5)
SODIUM SERPL-SCNC: 137 MMOL/L — SIGNIFICANT CHANGE UP (ref 135–145)
WBC # BLD: 4.28 K/UL — SIGNIFICANT CHANGE UP (ref 3.8–10.5)
WBC # FLD AUTO: 4.28 K/UL — SIGNIFICANT CHANGE UP (ref 3.8–10.5)

## 2017-11-08 PROCEDURE — 99233 SBSQ HOSP IP/OBS HIGH 50: CPT

## 2017-11-08 RX ORDER — SODIUM CHLORIDE 9 MG/ML
1000 INJECTION INTRAMUSCULAR; INTRAVENOUS; SUBCUTANEOUS
Qty: 0 | Refills: 0 | Status: DISCONTINUED | OUTPATIENT
Start: 2017-11-08 | End: 2017-11-10

## 2017-11-08 RX ORDER — SODIUM CHLORIDE 9 MG/ML
1000 INJECTION INTRAMUSCULAR; INTRAVENOUS; SUBCUTANEOUS
Qty: 0 | Refills: 0 | Status: DISCONTINUED | OUTPATIENT
Start: 2017-11-08 | End: 2017-11-08

## 2017-11-08 RX ORDER — HYDROMORPHONE HYDROCHLORIDE 2 MG/ML
4 INJECTION INTRAMUSCULAR; INTRAVENOUS; SUBCUTANEOUS EVERY 6 HOURS
Qty: 0 | Refills: 0 | Status: DISCONTINUED | OUTPATIENT
Start: 2017-11-08 | End: 2017-11-11

## 2017-11-08 RX ADMIN — POLYETHYLENE GLYCOL 3350 17 GRAM(S): 17 POWDER, FOR SOLUTION ORAL at 06:50

## 2017-11-08 RX ADMIN — HYDROMORPHONE HYDROCHLORIDE 1 MILLIGRAM(S): 2 INJECTION INTRAMUSCULAR; INTRAVENOUS; SUBCUTANEOUS at 05:36

## 2017-11-08 RX ADMIN — HYDROMORPHONE HYDROCHLORIDE 4 MILLIGRAM(S): 2 INJECTION INTRAMUSCULAR; INTRAVENOUS; SUBCUTANEOUS at 18:16

## 2017-11-08 RX ADMIN — POLYETHYLENE GLYCOL 3350 17 GRAM(S): 17 POWDER, FOR SOLUTION ORAL at 17:17

## 2017-11-08 RX ADMIN — HYDROMORPHONE HYDROCHLORIDE 4 MILLIGRAM(S): 2 INJECTION INTRAMUSCULAR; INTRAVENOUS; SUBCUTANEOUS at 17:16

## 2017-11-08 RX ADMIN — SENNA PLUS 2 TABLET(S): 8.6 TABLET ORAL at 21:46

## 2017-11-08 RX ADMIN — Medication 100 MILLIGRAM(S): at 21:46

## 2017-11-08 RX ADMIN — TAMSULOSIN HYDROCHLORIDE 0.4 MILLIGRAM(S): 0.4 CAPSULE ORAL at 21:46

## 2017-11-08 RX ADMIN — Medication 100 MILLIGRAM(S): at 06:49

## 2017-11-08 RX ADMIN — HEPARIN SODIUM 5000 UNIT(S): 5000 INJECTION INTRAVENOUS; SUBCUTANEOUS at 13:09

## 2017-11-08 RX ADMIN — FINASTERIDE 5 MILLIGRAM(S): 5 TABLET, FILM COATED ORAL at 13:09

## 2017-11-08 RX ADMIN — HYDROMORPHONE HYDROCHLORIDE 1 MILLIGRAM(S): 2 INJECTION INTRAMUSCULAR; INTRAVENOUS; SUBCUTANEOUS at 05:50

## 2017-11-08 RX ADMIN — Medication 100 MILLIGRAM(S): at 13:10

## 2017-11-08 RX ADMIN — HEPARIN SODIUM 5000 UNIT(S): 5000 INJECTION INTRAVENOUS; SUBCUTANEOUS at 05:38

## 2017-11-08 RX ADMIN — Medication 325 MILLIGRAM(S): at 13:09

## 2017-11-08 NOTE — PROGRESS NOTE ADULT - ATTENDING COMMENTS
Patient seen and examined.     Pt s/p EUS w/ GI  yesterday however  was unable to visualize area and safely obtain biopsy. Plan is for IR to attempt to obtain biopsy on Tomorrow. NPO after midnight.

## 2017-11-08 NOTE — CHART NOTE - NSCHARTNOTEFT_GEN_A_CORE
IR Pre-Procedure Note:    72M pmhx of pancreatic adenocarcinoma( 2015) s/p whipple/ s/p gemcitabine X 6 cycles, and 1 month of radiation( last tx with gemcitabine in 2016) and xeloda p/w abdominal pain x 2 weeks found to have RP mass on CT A/P. Attempted biopsy by GI via EUS unsuccessful. IR to attempt biopsy tomorrow. Patient and family made aware.    Nadir Lambert, Care Team A, Intern

## 2017-11-08 NOTE — PROGRESS NOTE ADULT - PROBLEM SELECTOR PLAN 4
Complicated by epigastric pain radiating to RUQ - Now improved, patient has mild RLQ which is well controlled on PRN, Patient has taken 3 Dilaudid PRN's in 24hrs  - will monitor pain response  - Tylenol PRN for mild to moderate pain  - Dilaudid 0.5 IV q6 PRN for severe pain   - no bowel movement for 5 days without significant stool burden seen on CT A/P; cont bowel regimen and monitor Complicated by epigastric pain radiating to RUQ - Now improved, patient has mild RLQ which is well controlled on PRN,  - will monitor pain response  - Tylenol PRN for mild to moderate pain  - Dilaudid 0.5 IV q6 PRN for severe pain   - monitor bowel movement. No significant stool burden seen on CT A/P; cont bowel regimen and monitor

## 2017-11-08 NOTE — PROGRESS NOTE ADULT - PROBLEM SELECTOR PLAN 7
- Patient with enlarged prostate on CT A/P  - cont tamsulosin & finasteride  - Good urine outpt w/o bryson

## 2017-11-08 NOTE — PROGRESS NOTE ADULT - PROBLEM SELECTOR PLAN 1
On admission febrile to 102 , w/ HR >90 amd + CVA tenderness; now has been afebrile with urine cx & blood cx NGTD  - possibly secondary to ureteral compression in setting of new right RP mass  now s/p stent  - completed 5 day course of zosyn  -  Ludwig d/c'ed 11/4 given afebrile > 24hrs; post void-residual on bladder scans - consistently in low 100s  - outpatient Urology follow up for stent exchange in 3 months

## 2017-11-08 NOTE — PROGRESS NOTE ADULT - PROBLEM SELECTOR PLAN 6
likely multifactorial 2/2 RP mass causing hydronephrosis vs. BPH vs. pre-renal etiology in setting of poor PO intake/sepsis/ post obstructive diuresis  vs contrast induced (on 11/2). Now improving to 1.2 today from 1.4 yesterday  - c/w IVF @ 100 for now; will decrease to 75 tonight  - Continue to monitor I and IOs

## 2017-11-08 NOTE — PROGRESS NOTE ADULT - PROBLEM SELECTOR PLAN 2
Patient with hydronephrosis of R kidney seen on CT A/P 2/2 right RP mass  - s/p ureteral stent placement with Ludwig on 11/2 by Urology. Ludwig now dcd  - c/w tamsulosin & finasteride for likely BPH given enlarged prostate on CT A/P  - cont to monitor UOP, Out 1900 over past 24 hours Patient with hydronephrosis of R kidney seen on CT A/P 2/2 right RP mass  - s/p ureteral stent placement with Ludwig on 11/2 by Urology. Ludwig now dcd  - c/w tamsulosin & finasteride for likely BPH given enlarged prostate on CT A/P  - cont to monitor UOP, Net neg 1900 cc in past 24 hours

## 2017-11-08 NOTE — PROGRESS NOTE ADULT - PROBLEM SELECTOR PLAN 3
Right RP mass with multiple arterially enhancing lesions at the hepatic dome found on CT A/P concerning for recurrence of pancreatic CA vs. new malignancy?   - CEA noted to be elevated to 6.3 in June  - will obtain tissue pathology per Heme/Onc recommendations  - GI unable to access RP mass yesterday  - IR will attempt to biopsy RP mass tomorrow   - NPO at midnight

## 2017-11-08 NOTE — PROGRESS NOTE ADULT - PROBLEM SELECTOR PLAN 5
RP mass visualized on CT A/P with R hydronephrosis   - F/u Heme/onc recs, requesting tissue biopsy of RP mass  - Pain control adequate; will transition from IV to PO pain meds as tolerated RP mass visualized on CT A/P with R hydronephrosis. Recurrence of pancreatic cancer vs new malignancy?   - F/u Heme/onc recs, requesting tissue biopsy of RP mass which is being arranged   - Pain control adequate; will transition from IV to PO pain meds as tolerated

## 2017-11-08 NOTE — PROGRESS NOTE ADULT - SUBJECTIVE AND OBJECTIVE BOX
Patient is a 72y old  Male who presents with a chief complaint of Came for complaints of Epigastric Pain radiating from the right side Abdomen to the left side, no nausea or vomiting (05 Nov 2017 09:51)      SUBJECTIVE / OVERNIGHT EVENTS: GI was unable to reach RP mas by EUS yesterday. Patient is comfortable this morning with no complaints of pain or discomfort.     MEDICATIONS  (STANDING):  docusate sodium 100 milliGRAM(s) Oral three times a day  ferrous    sulfate 325 milliGRAM(s) Oral daily  finasteride 5 milliGRAM(s) Oral daily  heparin  Injectable 5000 Unit(s) SubCutaneous every 8 hours  polyethylene glycol 3350 17 Gram(s) Oral two times a day  senna 2 Tablet(s) Oral at bedtime  sodium chloride 0.9%. 1000 milliLiter(s) (100 mL/Hr) IV Continuous <Continuous>  tamsulosin 0.4 milliGRAM(s) Oral at bedtime    MEDICATIONS  (PRN):  acetaminophen   Tablet 650 milliGRAM(s) Oral every 6 hours PRN For Temp greater than 38 C (100.4 F)  acetaminophen   Tablet. 650 milliGRAM(s) Oral every 6 hours PRN Mild to moderate pain 1-5  HYDROmorphone  Injectable 1 milliGRAM(s) IV Push every 4 hours PRN For moderate to severe pain  simethicone 80 milliGRAM(s) Chew every 6 hours PRN Dyspepsia        CAPILLARY BLOOD GLUCOSE        I&O's Summary    07 Nov 2017 07:01  -  08 Nov 2017 07:00  --------------------------------------------------------  IN: 0 mL / OUT: 1900 mL / NET: -1900 mL        PHYSICAL EXAM:  VS: T 98.5, HR 70, /87, RR 18, SpO2 100% on RA  Constitutional: NAD, awake and alert  EYES: EOMI  ENT:  Normal Hearing, no tonsillar exudates   Neck: Soft and supple, No JVD  Respiratory: Breath sounds are clear bilaterally, No wheezing, rales or rhonchi  Cardiovascular: S1 and S2, regular rate and rhythm, no Murmurs, gallops or rubs  Gastrointestinal: Bowel Sounds present, soft, abdomen nt/nd, No CVA tenderness or flank pain. Chevron scar well healed, nondistended, no guarding, no rebound  Extremities: No cyanosis or clubbing; warm to touch  Vascular: 2+ peripheral pulses lower ex  Neurological: A/O x 3, no focal deficits  Musculoskeletal: 5/5 strength b/l upper and lower extremities  Skin: No rashes, warm & dry  Psych: no depression or anhedonia  HEME: no bruises, no nose bleeds    LABS:                        10.9   4.28  )-----------( 251      ( 08 Nov 2017 06:00 )             33.3     11-08    137  |  100  |  14  ----------------------------<  91  4.0   |  24  |  1.22    Ca    8.7      08 Nov 2017 06:00  Phos  2.7     11-08  Mg     2.0     11-08                RADIOLOGY & ADDITIONAL TESTS:    No new additional studies.

## 2017-11-09 ENCOUNTER — RESULT REVIEW (OUTPATIENT)
Age: 72
End: 2017-11-09

## 2017-11-09 LAB
BUN SERPL-MCNC: 16 MG/DL — SIGNIFICANT CHANGE UP (ref 7–23)
CALCIUM SERPL-MCNC: 8.9 MG/DL — SIGNIFICANT CHANGE UP (ref 8.4–10.5)
CHLORIDE SERPL-SCNC: 99 MMOL/L — SIGNIFICANT CHANGE UP (ref 98–107)
CO2 SERPL-SCNC: 27 MMOL/L — SIGNIFICANT CHANGE UP (ref 22–31)
CREAT SERPL-MCNC: 1.24 MG/DL — SIGNIFICANT CHANGE UP (ref 0.5–1.3)
GLUCOSE SERPL-MCNC: 99 MG/DL — SIGNIFICANT CHANGE UP (ref 70–99)
HCT VFR BLD CALC: 33.9 % — LOW (ref 39–50)
HGB BLD-MCNC: 11.3 G/DL — LOW (ref 13–17)
MAGNESIUM SERPL-MCNC: 2.1 MG/DL — SIGNIFICANT CHANGE UP (ref 1.6–2.6)
MCHC RBC-ENTMCNC: 30 PG — SIGNIFICANT CHANGE UP (ref 27–34)
MCHC RBC-ENTMCNC: 33.3 % — SIGNIFICANT CHANGE UP (ref 32–36)
MCV RBC AUTO: 89.9 FL — SIGNIFICANT CHANGE UP (ref 80–100)
NRBC # FLD: 0 — SIGNIFICANT CHANGE UP
PHOSPHATE SERPL-MCNC: 2.8 MG/DL — SIGNIFICANT CHANGE UP (ref 2.5–4.5)
PLATELET # BLD AUTO: 261 K/UL — SIGNIFICANT CHANGE UP (ref 150–400)
PMV BLD: 9.7 FL — SIGNIFICANT CHANGE UP (ref 7–13)
POTASSIUM SERPL-MCNC: 4.3 MMOL/L — SIGNIFICANT CHANGE UP (ref 3.5–5.3)
POTASSIUM SERPL-SCNC: 4.3 MMOL/L — SIGNIFICANT CHANGE UP (ref 3.5–5.3)
RBC # BLD: 3.77 M/UL — LOW (ref 4.2–5.8)
RBC # FLD: 12.9 % — SIGNIFICANT CHANGE UP (ref 10.3–14.5)
SODIUM SERPL-SCNC: 137 MMOL/L — SIGNIFICANT CHANGE UP (ref 135–145)
WBC # BLD: 5.84 K/UL — SIGNIFICANT CHANGE UP (ref 3.8–10.5)
WBC # FLD AUTO: 5.84 K/UL — SIGNIFICANT CHANGE UP (ref 3.8–10.5)

## 2017-11-09 PROCEDURE — 88173 CYTOPATH EVAL FNA REPORT: CPT | Mod: 26

## 2017-11-09 PROCEDURE — 88305 TISSUE EXAM BY PATHOLOGIST: CPT | Mod: 26

## 2017-11-09 PROCEDURE — 88342 IMHCHEM/IMCYTCHM 1ST ANTB: CPT | Mod: 26,59

## 2017-11-09 PROCEDURE — 77012 CT SCAN FOR NEEDLE BIOPSY: CPT | Mod: 26

## 2017-11-09 PROCEDURE — 99233 SBSQ HOSP IP/OBS HIGH 50: CPT

## 2017-11-09 PROCEDURE — 88360 TUMOR IMMUNOHISTOCHEM/MANUAL: CPT | Mod: 26

## 2017-11-09 PROCEDURE — 10022: CPT

## 2017-11-09 PROCEDURE — 88341 IMHCHEM/IMCYTCHM EA ADD ANTB: CPT | Mod: 26,59

## 2017-11-09 RX ADMIN — SENNA PLUS 2 TABLET(S): 8.6 TABLET ORAL at 21:32

## 2017-11-09 RX ADMIN — HYDROMORPHONE HYDROCHLORIDE 4 MILLIGRAM(S): 2 INJECTION INTRAMUSCULAR; INTRAVENOUS; SUBCUTANEOUS at 14:03

## 2017-11-09 RX ADMIN — HYDROMORPHONE HYDROCHLORIDE 4 MILLIGRAM(S): 2 INJECTION INTRAMUSCULAR; INTRAVENOUS; SUBCUTANEOUS at 21:32

## 2017-11-09 RX ADMIN — HYDROMORPHONE HYDROCHLORIDE 4 MILLIGRAM(S): 2 INJECTION INTRAMUSCULAR; INTRAVENOUS; SUBCUTANEOUS at 07:15

## 2017-11-09 RX ADMIN — Medication 325 MILLIGRAM(S): at 12:25

## 2017-11-09 RX ADMIN — HYDROMORPHONE HYDROCHLORIDE 4 MILLIGRAM(S): 2 INJECTION INTRAMUSCULAR; INTRAVENOUS; SUBCUTANEOUS at 15:03

## 2017-11-09 RX ADMIN — HYDROMORPHONE HYDROCHLORIDE 4 MILLIGRAM(S): 2 INJECTION INTRAMUSCULAR; INTRAVENOUS; SUBCUTANEOUS at 22:02

## 2017-11-09 RX ADMIN — POLYETHYLENE GLYCOL 3350 17 GRAM(S): 17 POWDER, FOR SOLUTION ORAL at 17:34

## 2017-11-09 RX ADMIN — FINASTERIDE 5 MILLIGRAM(S): 5 TABLET, FILM COATED ORAL at 12:25

## 2017-11-09 RX ADMIN — Medication 100 MILLIGRAM(S): at 21:32

## 2017-11-09 RX ADMIN — TAMSULOSIN HYDROCHLORIDE 0.4 MILLIGRAM(S): 0.4 CAPSULE ORAL at 21:32

## 2017-11-09 RX ADMIN — HYDROMORPHONE HYDROCHLORIDE 4 MILLIGRAM(S): 2 INJECTION INTRAMUSCULAR; INTRAVENOUS; SUBCUTANEOUS at 07:45

## 2017-11-09 NOTE — PROGRESS NOTE ADULT - PROBLEM SELECTOR PLAN 3
Right RP mass with multiple arterially enhancing lesions at the hepatic dome found on CT A/P concerning for recurrence of pancreatic CA vs. new malignancy?   - CEA noted to be elevated to 6.3 in June  - will obtain tissue pathology per Heme/Onc recommendations  - GI unable to access RP mass on 11/7  - IR will attempt to biopsy RP mass today  - NPO since midnight

## 2017-11-09 NOTE — PROGRESS NOTE ADULT - PROBLEM SELECTOR PLAN 1
On admission febrile to 102 , w/ HR >90 amd + CVA tenderness; now has been afebrile with urine cx & blood cx NGTD  - possibly secondary to ureteral compression in setting of new right RP mass  now s/p stent  - completed 5 day course of zosyn  -  Ludwig d/c'ed 11/4 given afebrile > 24hrs; post void-residual on bladder scans - consistently in low 100s  - outpatient Urology follow up for stent exchange in 3 months On admission febrile to 102 , w/ HR >90 and + CVA tenderness; now has been afebrile with urine cx & blood cx NGTD  - possibly secondary to ureteral compression in setting of new right RP mass  now s/p stent  - completed 5 day course of zosyn  -  Ludwig d/c'ed 11/4   - outpatient Urology follow up for stent exchange in 3 months

## 2017-11-09 NOTE — PROGRESS NOTE ADULT - PROBLEM SELECTOR PLAN 4
Complicated by epigastric pain radiating to RUQ - Now improved, patient has mild RLQ which is well controlled on PRN,  - will monitor pain response  - Tylenol PRN for mild to moderate pain  - Dilaudid 4mg PO q6h PRN for severe pain   - monitor bowel movement. No significant stool burden seen on CT A/P; cont bowel regimen and monitor Complicated by epigastric pain radiating to RUQ - Now improved, patient has mild RLQ which is well controlled on PRN,  - will monitor pain response  - Tylenol PRN for mild to moderate pain  - Dilaudid 4mg PO q6h PRN for severe pain   - monitor bowel movement.; cont bowel regimen and monitor

## 2017-11-09 NOTE — PROGRESS NOTE ADULT - ASSESSMENT
72M with pancreatic adenocarcinoma (dx. 2015) s/p whipple s/p gemcitabine (x 6 cycles) & RT (completed 2016) , s/p Xeloda presents for 2-week history of abdominal pain found to have right RP mass on CT likely recurrence of pancreatic CA vs. new malignancy c/b R. ureteral compression , hydronephrosis  sepsis 2/2 pyelo requiring urgent ureteral stent placement by urology for decompression. 72M with pancreatic adenocarcinoma (dx. 2015) s/p whipple s/p gemcitabine (x 6 cycles) & RT (completed 2016) , s/p Xeloda presents for 2-week history of abdominal pain found to have right RP mass on CT likely recurrence of pancreatic CA vs. new malignancy c/b R. ureteral compression , hydronephrosis  sepsis 2/2 pyelo requiring urgent ureteral stent placement by urology for decompression, now pending RP mass biopsy.

## 2017-11-09 NOTE — PROGRESS NOTE ADULT - SUBJECTIVE AND OBJECTIVE BOX
Patient is a 72y old  Male who presents with a chief complaint of Came for complaints of Epigastric Pain radiating from the right side Abdomen to the left side, no nausea or vomiting (05 Nov 2017 09:51)      SUBJECTIVE / OVERNIGHT EVENTS: Patient complaining of mild epigastric pain overnight since being transitioned off of IV dilaudid but only received 1 PO dose. Pt. feeling better after PRN given in AM. No other complaints such as fever, chills, n/v/d/c. NPO since midnight for IR procedure today.    MEDICATIONS  (STANDING):  docusate sodium 100 milliGRAM(s) Oral three times a day  ferrous    sulfate 325 milliGRAM(s) Oral daily  finasteride 5 milliGRAM(s) Oral daily  heparin  Injectable 5000 Unit(s) SubCutaneous every 8 hours  polyethylene glycol 3350 17 Gram(s) Oral two times a day  senna 2 Tablet(s) Oral at bedtime  sodium chloride 0.9%. 1000 milliLiter(s) (75 mL/Hr) IV Continuous <Continuous>  tamsulosin 0.4 milliGRAM(s) Oral at bedtime    MEDICATIONS  (PRN):  acetaminophen   Tablet 650 milliGRAM(s) Oral every 6 hours PRN For Temp greater than 38 C (100.4 F)  acetaminophen   Tablet. 650 milliGRAM(s) Oral every 6 hours PRN Mild to moderate pain 1-5  HYDROmorphone   Tablet 4 milliGRAM(s) Oral every 6 hours PRN Severe Pain (7 - 10)  simethicone 80 milliGRAM(s) Chew every 6 hours PRN Dyspepsia        CAPILLARY BLOOD GLUCOSE        I&O's Summary    08 Nov 2017 07:01  -  09 Nov 2017 07:00  --------------------------------------------------------  IN: 870 mL / OUT: 400 mL / NET: 470 mL        PHYSICAL EXAM:  VS: T 98.4, HR 87, /92, RR 18, SpO2 98%  Constitutional: NAD, awake and alert  EYES: EOMI  ENT:  Normal Hearing, no tonsillar exudates   Neck: Soft and supple, No JVD  Respiratory: Breath sounds are clear bilaterally, No wheezing, rales or rhonchi  Cardiovascular: S1 and S2, regular rate and rhythm, no Murmurs, gallops or rubs  Gastrointestinal: Bowel Sounds present, soft, abdomen nondistended, mildly tender in epigastric area, No CVA tenderness or flank pain. Chevron scar well healed, nondistended, no guarding, no rebound  Extremities: No cyanosis or clubbing; wwp  Vascular: 2+ peripheral pulses lower ex  Neurological: A/O x 3, no focal deficits  Musculoskeletal: 5/5 strength b/l upper and lower extremities  Skin: No rashes, warm & dry  Psych: no depression or anhedonia  HEME: no bruises, no nose bleeds    LABS:                        11.3   5.84  )-----------( 261      ( 09 Nov 2017 04:40 )             33.9     11-09    137  |  99  |  16  ----------------------------<  99  4.3   |  27  |  1.24    Ca    8.9      09 Nov 2017 04:40  Phos  2.8     11-09  Mg     2.1     11-09                RADIOLOGY & ADDITIONAL TESTS:    No new additional studies.

## 2017-11-09 NOTE — PROGRESS NOTE ADULT - PROBLEM SELECTOR PLAN 2
Patient with hydronephrosis of R kidney seen on CT A/P 2/2 right RP mass  - s/p ureteral stent placement with Ludwig on 11/2 by Urology. Ludwig now dcd  - c/w tamsulosin & finasteride for likely BPH given enlarged prostate on CT A/P  - cont to monitor UOP, Pt went for IR procedure this AM - will clarify UO with nurse

## 2017-11-09 NOTE — PROGRESS NOTE ADULT - ATTENDING COMMENTS
Patient seen and examined.     RP mass biopsy  w/ GI on 11/7 unsuccessful. Plan is for IR to attempt biopsy today.

## 2017-11-09 NOTE — PROGRESS NOTE ADULT - PROBLEM SELECTOR PLAN 6
likely multifactorial 2/2 RP mass causing hydronephrosis vs. BPH vs. pre-renal etiology in setting of poor PO intake/sepsis/ post obstructive diuresis  vs contrast induced (on 11/2). Now with slight uptrend to 1.24 from 1.22 yesterday but overall improved.  - c/w IVF @ 76   - Continue to monitor I and IOs

## 2017-11-09 NOTE — PROGRESS NOTE ADULT - PROBLEM SELECTOR PLAN 5
RP mass visualized on CT A/P with R hydronephrosis. Recurrence of pancreatic cancer vs new malignancy?   - F/u Heme/onc recs, requesting tissue biopsy of RP mass which is being arranged   - Pain control adequate on dilaudid PO PRN when given RP mass visualized on CT A/P with R hydronephrosis. Recurrence of pancreatic cancer vs new malignancy?   - F/u Heme/onc recs, requesting tissue biopsy of RP mass which is being arranged   - Patient will then f/u w/ heme/onc as outpatient

## 2017-11-10 LAB
BASOPHILS # BLD AUTO: 0.03 K/UL — SIGNIFICANT CHANGE UP (ref 0–0.2)
BASOPHILS NFR BLD AUTO: 0.7 % — SIGNIFICANT CHANGE UP (ref 0–2)
BLD GP AB SCN SERPL QL: NEGATIVE — SIGNIFICANT CHANGE UP
BUN SERPL-MCNC: 18 MG/DL — SIGNIFICANT CHANGE UP (ref 7–23)
CALCIUM SERPL-MCNC: 9 MG/DL — SIGNIFICANT CHANGE UP (ref 8.4–10.5)
CHLORIDE SERPL-SCNC: 98 MMOL/L — SIGNIFICANT CHANGE UP (ref 98–107)
CO2 SERPL-SCNC: 21 MMOL/L — LOW (ref 22–31)
CREAT SERPL-MCNC: 1.18 MG/DL — SIGNIFICANT CHANGE UP (ref 0.5–1.3)
EOSINOPHIL # BLD AUTO: 0.11 K/UL — SIGNIFICANT CHANGE UP (ref 0–0.5)
EOSINOPHIL NFR BLD AUTO: 2.5 % — SIGNIFICANT CHANGE UP (ref 0–6)
GLUCOSE SERPL-MCNC: 96 MG/DL — SIGNIFICANT CHANGE UP (ref 70–99)
HCT VFR BLD CALC: 33.6 % — LOW (ref 39–50)
HCT VFR BLD CALC: 34.7 % — LOW (ref 39–50)
HGB BLD-MCNC: 11 G/DL — LOW (ref 13–17)
HGB BLD-MCNC: 11.4 G/DL — LOW (ref 13–17)
IMM GRANULOCYTES # BLD AUTO: 0.01 # — SIGNIFICANT CHANGE UP
IMM GRANULOCYTES NFR BLD AUTO: 0.2 % — SIGNIFICANT CHANGE UP (ref 0–1.5)
LYMPHOCYTES # BLD AUTO: 0.9 K/UL — LOW (ref 1–3.3)
LYMPHOCYTES # BLD AUTO: 20.2 % — SIGNIFICANT CHANGE UP (ref 13–44)
MAGNESIUM SERPL-MCNC: 2.1 MG/DL — SIGNIFICANT CHANGE UP (ref 1.6–2.6)
MCHC RBC-ENTMCNC: 29.3 PG — SIGNIFICANT CHANGE UP (ref 27–34)
MCHC RBC-ENTMCNC: 29.9 PG — SIGNIFICANT CHANGE UP (ref 27–34)
MCHC RBC-ENTMCNC: 32.7 % — SIGNIFICANT CHANGE UP (ref 32–36)
MCHC RBC-ENTMCNC: 32.9 % — SIGNIFICANT CHANGE UP (ref 32–36)
MCV RBC AUTO: 89.6 FL — SIGNIFICANT CHANGE UP (ref 80–100)
MCV RBC AUTO: 91.1 FL — SIGNIFICANT CHANGE UP (ref 80–100)
MONOCYTES # BLD AUTO: 0.37 K/UL — SIGNIFICANT CHANGE UP (ref 0–0.9)
MONOCYTES NFR BLD AUTO: 8.3 % — SIGNIFICANT CHANGE UP (ref 2–14)
NEUTROPHILS # BLD AUTO: 3.04 K/UL — SIGNIFICANT CHANGE UP (ref 1.8–7.4)
NEUTROPHILS NFR BLD AUTO: 68.1 % — SIGNIFICANT CHANGE UP (ref 43–77)
NRBC # FLD: 0 — SIGNIFICANT CHANGE UP
NRBC # FLD: 0 — SIGNIFICANT CHANGE UP
PHOSPHATE SERPL-MCNC: 2.8 MG/DL — SIGNIFICANT CHANGE UP (ref 2.5–4.5)
PLATELET # BLD AUTO: 262 K/UL — SIGNIFICANT CHANGE UP (ref 150–400)
PLATELET # BLD AUTO: 271 K/UL — SIGNIFICANT CHANGE UP (ref 150–400)
PMV BLD: 10.2 FL — SIGNIFICANT CHANGE UP (ref 7–13)
PMV BLD: 10.5 FL — SIGNIFICANT CHANGE UP (ref 7–13)
POTASSIUM SERPL-MCNC: 4.1 MMOL/L — SIGNIFICANT CHANGE UP (ref 3.5–5.3)
POTASSIUM SERPL-SCNC: 4.1 MMOL/L — SIGNIFICANT CHANGE UP (ref 3.5–5.3)
RBC # BLD: 3.75 M/UL — LOW (ref 4.2–5.8)
RBC # BLD: 3.81 M/UL — LOW (ref 4.2–5.8)
RBC # FLD: 13.2 % — SIGNIFICANT CHANGE UP (ref 10.3–14.5)
RBC # FLD: 13.2 % — SIGNIFICANT CHANGE UP (ref 10.3–14.5)
RH IG SCN BLD-IMP: POSITIVE — SIGNIFICANT CHANGE UP
SODIUM SERPL-SCNC: 135 MMOL/L — SIGNIFICANT CHANGE UP (ref 135–145)
WBC # BLD: 4.46 K/UL — SIGNIFICANT CHANGE UP (ref 3.8–10.5)
WBC # BLD: 6.5 K/UL — SIGNIFICANT CHANGE UP (ref 3.8–10.5)
WBC # FLD AUTO: 4.46 K/UL — SIGNIFICANT CHANGE UP (ref 3.8–10.5)
WBC # FLD AUTO: 6.5 K/UL — SIGNIFICANT CHANGE UP (ref 3.8–10.5)

## 2017-11-10 PROCEDURE — 99233 SBSQ HOSP IP/OBS HIGH 50: CPT

## 2017-11-10 PROCEDURE — 74000: CPT | Mod: 26

## 2017-11-10 PROCEDURE — 99232 SBSQ HOSP IP/OBS MODERATE 35: CPT | Mod: GC

## 2017-11-10 RX ORDER — PANTOPRAZOLE SODIUM 20 MG/1
8 TABLET, DELAYED RELEASE ORAL
Qty: 80 | Refills: 0 | Status: DISCONTINUED | OUTPATIENT
Start: 2017-11-10 | End: 2017-11-10

## 2017-11-10 RX ORDER — PANTOPRAZOLE SODIUM 20 MG/1
40 TABLET, DELAYED RELEASE ORAL
Qty: 0 | Refills: 0 | Status: DISCONTINUED | OUTPATIENT
Start: 2017-11-10 | End: 2017-11-11

## 2017-11-10 RX ORDER — SODIUM CHLORIDE 9 MG/ML
1000 INJECTION INTRAMUSCULAR; INTRAVENOUS; SUBCUTANEOUS
Qty: 0 | Refills: 0 | Status: DISCONTINUED | OUTPATIENT
Start: 2017-11-10 | End: 2017-11-10

## 2017-11-10 RX ADMIN — Medication 650 MILLIGRAM(S): at 22:09

## 2017-11-10 RX ADMIN — PANTOPRAZOLE SODIUM 40 MILLIGRAM(S): 20 TABLET, DELAYED RELEASE ORAL at 10:22

## 2017-11-10 RX ADMIN — HYDROMORPHONE HYDROCHLORIDE 4 MILLIGRAM(S): 2 INJECTION INTRAMUSCULAR; INTRAVENOUS; SUBCUTANEOUS at 23:00

## 2017-11-10 RX ADMIN — Medication 100 MILLIGRAM(S): at 05:32

## 2017-11-10 RX ADMIN — HEPARIN SODIUM 5000 UNIT(S): 5000 INJECTION INTRAVENOUS; SUBCUTANEOUS at 05:33

## 2017-11-10 RX ADMIN — HYDROMORPHONE HYDROCHLORIDE 4 MILLIGRAM(S): 2 INJECTION INTRAMUSCULAR; INTRAVENOUS; SUBCUTANEOUS at 06:02

## 2017-11-10 RX ADMIN — Medication 325 MILLIGRAM(S): at 12:50

## 2017-11-10 RX ADMIN — HYDROMORPHONE HYDROCHLORIDE 4 MILLIGRAM(S): 2 INJECTION INTRAMUSCULAR; INTRAVENOUS; SUBCUTANEOUS at 13:55

## 2017-11-10 RX ADMIN — PANTOPRAZOLE SODIUM 40 MILLIGRAM(S): 20 TABLET, DELAYED RELEASE ORAL at 17:10

## 2017-11-10 RX ADMIN — HYDROMORPHONE HYDROCHLORIDE 4 MILLIGRAM(S): 2 INJECTION INTRAMUSCULAR; INTRAVENOUS; SUBCUTANEOUS at 14:55

## 2017-11-10 RX ADMIN — POLYETHYLENE GLYCOL 3350 17 GRAM(S): 17 POWDER, FOR SOLUTION ORAL at 05:32

## 2017-11-10 RX ADMIN — HYDROMORPHONE HYDROCHLORIDE 4 MILLIGRAM(S): 2 INJECTION INTRAMUSCULAR; INTRAVENOUS; SUBCUTANEOUS at 22:05

## 2017-11-10 RX ADMIN — FINASTERIDE 5 MILLIGRAM(S): 5 TABLET, FILM COATED ORAL at 12:50

## 2017-11-10 RX ADMIN — Medication 100 MILLIGRAM(S): at 22:03

## 2017-11-10 RX ADMIN — SIMETHICONE 80 MILLIGRAM(S): 80 TABLET, CHEWABLE ORAL at 22:04

## 2017-11-10 RX ADMIN — SENNA PLUS 2 TABLET(S): 8.6 TABLET ORAL at 22:03

## 2017-11-10 RX ADMIN — HYDROMORPHONE HYDROCHLORIDE 4 MILLIGRAM(S): 2 INJECTION INTRAMUSCULAR; INTRAVENOUS; SUBCUTANEOUS at 05:32

## 2017-11-10 RX ADMIN — TAMSULOSIN HYDROCHLORIDE 0.4 MILLIGRAM(S): 0.4 CAPSULE ORAL at 22:04

## 2017-11-10 NOTE — PROGRESS NOTE ADULT - ATTENDING COMMENTS
Patient seen and examined.     Now s/p RP mass bx yesterday by IR.     This am nurse reported patient w/ worsening abdominal pain and dark stools. At bedside patient w/ more significant TTP diffusely but more in the epigastric and right side of the abdomen. Per patient he was also feeling dizzy when he was having a BM but feels better now while laying down.   This is concerning given pts recent procedure that per IR would be difficult approach as they would have to go through bowel.  -will make NPO  -STAT CBC, type and screen, will transfuse if H/H <7 or acutely dropping  -start protonix IV  -monitor vitals, serial abdominal exams, monitor BMs   -Abdominal Xray   -GI called to evaluate, will f/u recs

## 2017-11-10 NOTE — PROGRESS NOTE ADULT - ASSESSMENT
72M with pancreatic adenocarcinoma (dx. 2015) s/p whipple s/p gemcitabine (x 6 cycles) & RT (completed 2016) , s/p Xeloda presents for 2-week history of abdominal pain found to have right RP mass on CT likely recurrence of pancreatic CA vs. new malignancy c/b R. ureteral compression , hydronephrosis  sepsis 2/2 pyelo requiring urgent ureteral stent placement by urology for decompression, now s/p RP mass biopsy yesterday.

## 2017-11-10 NOTE — PROGRESS NOTE ADULT - PROBLEM SELECTOR PLAN 4
Complicated by epigastric pain radiating to RUQ - Now improved, patient has mild RLQ which is well controlled on PRN  - will monitor pain response  - Tylenol PRN for mild to moderate pain  - Dilaudid 4mg PO q6h PRN for severe pain   - monitor bowel movement.; cont bowel regimen and monitor On admission febrile to 102 , w/ HR >90 and + CVA tenderness; now has been afebrile with urine cx & blood cx NGTD  - possibly secondary to ureteral compression in setting of new right RP mass  now s/p stent  - s/p 5 day course of zosyn  -  Ludwig d/c'ed 11/4   - outpatient Urology follow up for stent exchange in 3 months

## 2017-11-10 NOTE — PROGRESS NOTE ADULT - PROBLEM SELECTOR PLAN 5
RP mass visualized on CT A/P with R hydronephrosis. Recurrence of pancreatic cancer vs new malignancy?   -f/u RP mass biopsy pathology  - F/u Heme/onc recs  - Patient will then f/u w/ heme/onc as outpatient

## 2017-11-10 NOTE — PROGRESS NOTE ADULT - SUBJECTIVE AND OBJECTIVE BOX
Patient is a 72y old  Male who presents with a chief complaint of Came for complaints of Epigastric Pain radiating from the right side Abdomen to the left side, no nausea or vomiting (05 Nov 2017 09:51)      SUBJECTIVE / OVERNIGHT EVENTS:  The patient had 2 episode of melena, one last night and one this am. His abd pain is stable since prior to procedure.  MEDICATIONS  (STANDING):  docusate sodium 100 milliGRAM(s) Oral three times a day  ferrous    sulfate 325 milliGRAM(s) Oral daily  finasteride 5 milliGRAM(s) Oral daily  pantoprazole  Injectable 40 milliGRAM(s) IV Push two times a day  polyethylene glycol 3350 17 Gram(s) Oral two times a day  senna 2 Tablet(s) Oral at bedtime  tamsulosin 0.4 milliGRAM(s) Oral at bedtime    MEDICATIONS  (PRN):  acetaminophen   Tablet 650 milliGRAM(s) Oral every 6 hours PRN For Temp greater than 38 C (100.4 F)  acetaminophen   Tablet. 650 milliGRAM(s) Oral every 6 hours PRN Mild to moderate pain 1-5  HYDROmorphone   Tablet 4 milliGRAM(s) Oral every 6 hours PRN Severe Pain (7 - 10)  simethicone 80 milliGRAM(s) Chew every 6 hours PRN Dyspepsia        CAPILLARY BLOOD GLUCOSE        I&O's Summary    09 Nov 2017 07:01  -  10 Nov 2017 07:00  --------------------------------------------------------  IN: 100 mL / OUT: 0 mL / NET: 100 mL    10 Nov 2017 07:01  -  10 Nov 2017 22:00  --------------------------------------------------------  IN: 100 mL / OUT: 0 mL / NET: 100 mL        ROS:  General: no fevers chills  Neuro: No headache  MSK: No back pain  Cardiac: No chest pain, palpitations  Pulmonary: No SOB or cough  GI: As per HPI  : No dysuria or hesitancy  Skin: No rash or pruritis      PHYSICAL EXAM:  GENERAL: NAD, well-developed  HEAD:  Atraumatic, Normocephalic  EYES: EOMI, PERRLA, conjunctiva and sclera anicteric  NECK: Supple, No JVD  CHEST/LUNG: Clear to auscultation bilaterally; No wheeze  HEART: Regular rate and rhythm; No murmurs, rubs, or gallops  ABDOMEN: Soft, slightly tender in R mid abdomen, Nondistended; Bowel sounds present, no hepatosplenomegaly, no rebound or guarding  EXTREMITIES:  2+ Peripheral Pulses, No clubbing, cyanosis, or edema  PSYCH: AAOx3  NEUROLOGY: non-focal, no asterixis  SKIN: No rashes or lesions, no palmar erythema or Dupuytren's contracture, no gynecomastia  RECTUM: brown stool    LABS:                        11.4   6.50  )-----------( 262      ( 10 Nov 2017 12:42 )             34.7     11-10    135  |  98  |  18  ----------------------------<  96  4.1   |  21<L>  |  1.18    Ca    9.0      10 Nov 2017 06:00  Phos  2.8     11-10  Mg     2.1     11-10                  RADIOLOGY & ADDITIONAL TESTS:

## 2017-11-10 NOTE — PROGRESS NOTE ADULT - SUBJECTIVE AND OBJECTIVE BOX
Patient is a 72y old  Male who presents with a chief complaint of Came for complaints of Epigastric Pain radiating from the right side Abdomen to the left side, no nausea or vomiting (05 Nov 2017 09:51)      SUBJECTIVE / OVERNIGHT EVENTS: Patient had RP mass biopsied yesterday by IR. Patient feels well this AM with no complaints. Pain is well-controlled w/ 16mg dilaudid PO over past 24 hours.    MEDICATIONS  (STANDING):  docusate sodium 100 milliGRAM(s) Oral three times a day  ferrous    sulfate 325 milliGRAM(s) Oral daily  finasteride 5 milliGRAM(s) Oral daily  heparin  Injectable 5000 Unit(s) SubCutaneous every 8 hours  polyethylene glycol 3350 17 Gram(s) Oral two times a day  senna 2 Tablet(s) Oral at bedtime  sodium chloride 0.9%. 1000 milliLiter(s) (75 mL/Hr) IV Continuous <Continuous>  tamsulosin 0.4 milliGRAM(s) Oral at bedtime    MEDICATIONS  (PRN):  acetaminophen   Tablet 650 milliGRAM(s) Oral every 6 hours PRN For Temp greater than 38 C (100.4 F)  acetaminophen   Tablet. 650 milliGRAM(s) Oral every 6 hours PRN Mild to moderate pain 1-5  HYDROmorphone   Tablet 4 milliGRAM(s) Oral every 6 hours PRN Severe Pain (7 - 10)  simethicone 80 milliGRAM(s) Chew every 6 hours PRN Dyspepsia        CAPILLARY BLOOD GLUCOSE        I&O's Summary    09 Nov 2017 07:01  -  10 Nov 2017 07:00  --------------------------------------------------------  IN: 100 mL / OUT: 0 mL / NET: 100 mL        PHYSICAL EXAM:  VS: T 98.5, HR 84, /92, RR 18, SpO2 100% on RA  Constitutional: NAD, awake and alert  EYES: EOMI  ENT:  Normal Hearing, no tonsillar exudates   Neck: Soft and supple, No JVD  Respiratory: Breath sounds are clear bilaterally, No wheezing, rales or rhonchi  Cardiovascular: S1 and S2, regular rate and rhythm, no Murmurs, gallops or rubs  Gastrointestinal: Surgery site with dressing that is c/d/i - no discharge, tenderness or surrounding erythema. Bowel Sounds present, soft, abdomen nondistended, nontender, No CVA tenderness or flank pain. Chevron scar well healed, nondistended, no guarding, no rebound  Extremities: No cyanosis or clubbing; wwp  Vascular: 2+ peripheral pulses lower ex  Neurological: A/O x 3, no focal deficits  Musculoskeletal: 5/5 strength b/l upper and lower extremities  Skin: No rashes, warm & dry  Psych: no depression or anhedonia  HEME: no bruises, no nose bleeds        LABS:                        11.0   4.46  )-----------( 271      ( 10 Nov 2017 06:00 )             33.6     11-10    135  |  98  |  18  ----------------------------<  96  4.1   |  21<L>  |  1.18    Ca    9.0      10 Nov 2017 06:00  Phos  2.8     11-10  Mg     2.1     11-10                RADIOLOGY & ADDITIONAL TESTS:    No new additional studies.

## 2017-11-10 NOTE — PROGRESS NOTE ADULT - PROBLEM SELECTOR PLAN 6
likely multifactorial 2/2 RP mass causing hydronephrosis vs. BPH vs. pre-renal etiology in setting of poor PO intake/sepsis/ post obstructive diuresis  vs contrast induced (on 11/2). Downtrending to 1.18 from 1.24 yesterday   - c/w IVF @ 76   - Continue to monitor I and IOs

## 2017-11-10 NOTE — PROGRESS NOTE ADULT - PROBLEM SELECTOR PLAN 1
On admission febrile to 102 , w/ HR >90 and + CVA tenderness; now has been afebrile with urine cx & blood cx NGTD  - possibly secondary to ureteral compression in setting of new right RP mass  now s/p stent  - s/p 5 day course of zosyn  -  Ludwig d/c'ed 11/4   - outpatient Urology follow up for stent exchange in 3 months Right RP mass with multiple arterially enhancing lesions at the hepatic dome found on CT A/P concerning for recurrence of pancreatic CA vs. new malignancy?   - CEA noted to be elevated to 6.3 in June  - will obtain tissue pathology per Heme/Onc recommendations  - RP mass biopsied by IR yesterday - results of pathology pending

## 2017-11-10 NOTE — PROGRESS NOTE ADULT - PROBLEM SELECTOR PLAN 2
Patient with hydronephrosis of R kidney seen on CT A/P 2/2 right RP mass  - s/p ureteral stent placement   - c/w tamsulosin & finasteride for likely BPH given enlarged prostate on CT A/P  - cont to monitor UOP Complicated by epigastric pain radiating to RUQ - Now improved, patient has mild RLQ which is well controlled on PRN  - will monitor pain response  - Tylenol PRN for mild to moderate pain  - Dilaudid 4mg PO q6h PRN for severe pain   - monitor bowel movement.; cont bowel regimen and monitor

## 2017-11-10 NOTE — PROGRESS NOTE ADULT - ASSESSMENT
Impression:    1. Melena: given no decrease in the hemoglobin and current brown stool on exam, suspect this was transient small amt of bleeding from biopsies taken on EGD. No clinically concerning lesions were seen on EGD 3 days ago so low risk for development of an ulcer.    2. RP mass: s/p IR biopsy    Recommendation:  -trend CBC in the am  -continue clear liquids, if hgb stable in am can advance diet as tolerated

## 2017-11-11 DIAGNOSIS — R19.00 INTRA-ABDOMINAL AND PELVIC SWELLING, MASS AND LUMP, UNSPECIFIED SITE: ICD-10-CM

## 2017-11-11 DIAGNOSIS — R50.9 FEVER, UNSPECIFIED: ICD-10-CM

## 2017-11-11 DIAGNOSIS — R10.9 UNSPECIFIED ABDOMINAL PAIN: ICD-10-CM

## 2017-11-11 LAB
APPEARANCE UR: SIGNIFICANT CHANGE UP
BACTERIA # UR AUTO: SIGNIFICANT CHANGE UP
BASOPHILS # BLD AUTO: 0.01 K/UL — SIGNIFICANT CHANGE UP (ref 0–0.2)
BASOPHILS NFR BLD AUTO: 0.1 % — SIGNIFICANT CHANGE UP (ref 0–2)
BILIRUB UR-MCNC: SIGNIFICANT CHANGE UP
BLOOD UR QL VISUAL: HIGH
BUN SERPL-MCNC: 19 MG/DL — SIGNIFICANT CHANGE UP (ref 7–23)
CALCIUM SERPL-MCNC: 8.6 MG/DL — SIGNIFICANT CHANGE UP (ref 8.4–10.5)
CHLORIDE SERPL-SCNC: 95 MMOL/L — LOW (ref 98–107)
CO2 SERPL-SCNC: 23 MMOL/L — SIGNIFICANT CHANGE UP (ref 22–31)
COLOR SPEC: YELLOW — SIGNIFICANT CHANGE UP
CREAT ?TM UR-MCNC: 101.56 MG/DL — SIGNIFICANT CHANGE UP
CREAT SERPL-MCNC: 1.33 MG/DL — HIGH (ref 0.5–1.3)
EOSINOPHIL # BLD AUTO: 0.02 K/UL — SIGNIFICANT CHANGE UP (ref 0–0.5)
EOSINOPHIL NFR BLD AUTO: 0.3 % — SIGNIFICANT CHANGE UP (ref 0–6)
GLUCOSE SERPL-MCNC: 120 MG/DL — HIGH (ref 70–99)
GLUCOSE UR-MCNC: 250 — SIGNIFICANT CHANGE UP
HCT VFR BLD CALC: 34.3 % — LOW (ref 39–50)
HGB BLD-MCNC: 11.6 G/DL — LOW (ref 13–17)
IMM GRANULOCYTES # BLD AUTO: 0.02 # — SIGNIFICANT CHANGE UP
IMM GRANULOCYTES NFR BLD AUTO: 0.3 % — SIGNIFICANT CHANGE UP (ref 0–1.5)
KETONES UR-MCNC: SIGNIFICANT CHANGE UP
LEUKOCYTE ESTERASE UR-ACNC: HIGH
LYMPHOCYTES # BLD AUTO: 0.32 K/UL — LOW (ref 1–3.3)
LYMPHOCYTES # BLD AUTO: 4.2 % — LOW (ref 13–44)
MAGNESIUM SERPL-MCNC: 1.9 MG/DL — SIGNIFICANT CHANGE UP (ref 1.6–2.6)
MCHC RBC-ENTMCNC: 30.2 PG — SIGNIFICANT CHANGE UP (ref 27–34)
MCHC RBC-ENTMCNC: 33.8 % — SIGNIFICANT CHANGE UP (ref 32–36)
MCV RBC AUTO: 89.3 FL — SIGNIFICANT CHANGE UP (ref 80–100)
METHOD TYPE: SIGNIFICANT CHANGE UP
MONOCYTES # BLD AUTO: 0.45 K/UL — SIGNIFICANT CHANGE UP (ref 0–0.9)
MONOCYTES NFR BLD AUTO: 6 % — SIGNIFICANT CHANGE UP (ref 2–14)
MUCOUS THREADS # UR AUTO: SIGNIFICANT CHANGE UP
NEUTROPHILS # BLD AUTO: 6.73 K/UL — SIGNIFICANT CHANGE UP (ref 1.8–7.4)
NEUTROPHILS NFR BLD AUTO: 89.1 % — HIGH (ref 43–77)
NITRITE UR-MCNC: NEGATIVE — SIGNIFICANT CHANGE UP
NRBC # FLD: 0 — SIGNIFICANT CHANGE UP
ORGANISM # SPEC MICROSCOPIC CNT: SIGNIFICANT CHANGE UP
ORGANISM # SPEC MICROSCOPIC CNT: SIGNIFICANT CHANGE UP
PH UR: 6 — SIGNIFICANT CHANGE UP (ref 4.6–8)
PHOSPHATE SERPL-MCNC: 2 MG/DL — LOW (ref 2.5–4.5)
PLATELET # BLD AUTO: 239 K/UL — SIGNIFICANT CHANGE UP (ref 150–400)
PMV BLD: 10.5 FL — SIGNIFICANT CHANGE UP (ref 7–13)
POTASSIUM SERPL-MCNC: 4.4 MMOL/L — SIGNIFICANT CHANGE UP (ref 3.5–5.3)
POTASSIUM SERPL-SCNC: 4.4 MMOL/L — SIGNIFICANT CHANGE UP (ref 3.5–5.3)
PROT UR-MCNC: 30 — SIGNIFICANT CHANGE UP
RBC # BLD: 3.84 M/UL — LOW (ref 4.2–5.8)
RBC # FLD: 13.2 % — SIGNIFICANT CHANGE UP (ref 10.3–14.5)
RBC CASTS # UR COMP ASSIST: HIGH (ref 0–?)
SODIUM SERPL-SCNC: 132 MMOL/L — LOW (ref 135–145)
SODIUM UR-SCNC: 36 MEQ/L — SIGNIFICANT CHANGE UP
SP GR SPEC: 1.02 — SIGNIFICANT CHANGE UP (ref 1–1.03)
SPECIMEN SOURCE: SIGNIFICANT CHANGE UP
SPECIMEN SOURCE: SIGNIFICANT CHANGE UP
SQUAMOUS # UR AUTO: SIGNIFICANT CHANGE UP
UROBILINOGEN FLD QL: 4 E.U. — HIGH (ref 0.1–0.2)
UUN UR-MCNC: 631.7 MG/DL — SIGNIFICANT CHANGE UP
WBC # BLD: 7.55 K/UL — SIGNIFICANT CHANGE UP (ref 3.8–10.5)
WBC # FLD AUTO: 7.55 K/UL — SIGNIFICANT CHANGE UP (ref 3.8–10.5)
WBC UR QL: HIGH (ref 0–?)

## 2017-11-11 PROCEDURE — 99233 SBSQ HOSP IP/OBS HIGH 50: CPT

## 2017-11-11 RX ORDER — PIPERACILLIN AND TAZOBACTAM 4; .5 G/20ML; G/20ML
3.38 INJECTION, POWDER, LYOPHILIZED, FOR SOLUTION INTRAVENOUS EVERY 8 HOURS
Qty: 0 | Refills: 0 | Status: DISCONTINUED | OUTPATIENT
Start: 2017-11-11 | End: 2017-11-13

## 2017-11-11 RX ORDER — SODIUM CHLORIDE 9 MG/ML
1000 INJECTION INTRAMUSCULAR; INTRAVENOUS; SUBCUTANEOUS
Qty: 0 | Refills: 0 | Status: DISCONTINUED | OUTPATIENT
Start: 2017-11-11 | End: 2017-11-11

## 2017-11-11 RX ORDER — PIPERACILLIN AND TAZOBACTAM 4; .5 G/20ML; G/20ML
3.38 INJECTION, POWDER, LYOPHILIZED, FOR SOLUTION INTRAVENOUS ONCE
Qty: 0 | Refills: 0 | Status: COMPLETED | OUTPATIENT
Start: 2017-11-11 | End: 2017-11-11

## 2017-11-11 RX ORDER — SODIUM CHLORIDE 9 MG/ML
1000 INJECTION INTRAMUSCULAR; INTRAVENOUS; SUBCUTANEOUS
Qty: 0 | Refills: 0 | Status: DISCONTINUED | OUTPATIENT
Start: 2017-11-11 | End: 2017-11-13

## 2017-11-11 RX ORDER — HYDROMORPHONE HYDROCHLORIDE 2 MG/ML
6 INJECTION INTRAMUSCULAR; INTRAVENOUS; SUBCUTANEOUS EVERY 6 HOURS
Qty: 0 | Refills: 0 | Status: DISCONTINUED | OUTPATIENT
Start: 2017-11-11 | End: 2017-11-13

## 2017-11-11 RX ORDER — SODIUM CHLORIDE 9 MG/ML
1000 INJECTION INTRAMUSCULAR; INTRAVENOUS; SUBCUTANEOUS ONCE
Qty: 0 | Refills: 0 | Status: COMPLETED | OUTPATIENT
Start: 2017-11-11 | End: 2017-11-11

## 2017-11-11 RX ORDER — SODIUM,POTASSIUM PHOSPHATES 278-250MG
1 POWDER IN PACKET (EA) ORAL
Qty: 0 | Refills: 0 | Status: COMPLETED | OUTPATIENT
Start: 2017-11-11 | End: 2017-11-11

## 2017-11-11 RX ADMIN — Medication 325 MILLIGRAM(S): at 11:42

## 2017-11-11 RX ADMIN — FINASTERIDE 5 MILLIGRAM(S): 5 TABLET, FILM COATED ORAL at 11:40

## 2017-11-11 RX ADMIN — Medication 1 TABLET(S): at 22:47

## 2017-11-11 RX ADMIN — HYDROMORPHONE HYDROCHLORIDE 6 MILLIGRAM(S): 2 INJECTION INTRAMUSCULAR; INTRAVENOUS; SUBCUTANEOUS at 17:54

## 2017-11-11 RX ADMIN — SODIUM CHLORIDE 1000 MILLILITER(S): 9 INJECTION INTRAMUSCULAR; INTRAVENOUS; SUBCUTANEOUS at 17:11

## 2017-11-11 RX ADMIN — PIPERACILLIN AND TAZOBACTAM 25 GRAM(S): 4; .5 INJECTION, POWDER, LYOPHILIZED, FOR SOLUTION INTRAVENOUS at 22:43

## 2017-11-11 RX ADMIN — HYDROMORPHONE HYDROCHLORIDE 4 MILLIGRAM(S): 2 INJECTION INTRAMUSCULAR; INTRAVENOUS; SUBCUTANEOUS at 12:22

## 2017-11-11 RX ADMIN — SODIUM CHLORIDE 100 MILLILITER(S): 9 INJECTION INTRAMUSCULAR; INTRAVENOUS; SUBCUTANEOUS at 11:40

## 2017-11-11 RX ADMIN — PIPERACILLIN AND TAZOBACTAM 25 GRAM(S): 4; .5 INJECTION, POWDER, LYOPHILIZED, FOR SOLUTION INTRAVENOUS at 14:03

## 2017-11-11 RX ADMIN — TAMSULOSIN HYDROCHLORIDE 0.4 MILLIGRAM(S): 0.4 CAPSULE ORAL at 22:43

## 2017-11-11 RX ADMIN — PIPERACILLIN AND TAZOBACTAM 200 GRAM(S): 4; .5 INJECTION, POWDER, LYOPHILIZED, FOR SOLUTION INTRAVENOUS at 09:14

## 2017-11-11 RX ADMIN — HYDROMORPHONE HYDROCHLORIDE 4 MILLIGRAM(S): 2 INJECTION INTRAMUSCULAR; INTRAVENOUS; SUBCUTANEOUS at 05:57

## 2017-11-11 RX ADMIN — Medication 1 TABLET(S): at 11:39

## 2017-11-11 RX ADMIN — HYDROMORPHONE HYDROCHLORIDE 4 MILLIGRAM(S): 2 INJECTION INTRAMUSCULAR; INTRAVENOUS; SUBCUTANEOUS at 11:39

## 2017-11-11 RX ADMIN — Medication 1 TABLET(S): at 17:14

## 2017-11-11 RX ADMIN — Medication 1 TABLET(S): at 09:14

## 2017-11-11 RX ADMIN — POLYETHYLENE GLYCOL 3350 17 GRAM(S): 17 POWDER, FOR SOLUTION ORAL at 17:12

## 2017-11-11 RX ADMIN — Medication 100 MILLIGRAM(S): at 05:56

## 2017-11-11 RX ADMIN — Medication 100 MILLIGRAM(S): at 14:03

## 2017-11-11 RX ADMIN — HYDROMORPHONE HYDROCHLORIDE 6 MILLIGRAM(S): 2 INJECTION INTRAMUSCULAR; INTRAVENOUS; SUBCUTANEOUS at 17:11

## 2017-11-11 RX ADMIN — SODIUM CHLORIDE 75 MILLILITER(S): 9 INJECTION INTRAMUSCULAR; INTRAVENOUS; SUBCUTANEOUS at 09:15

## 2017-11-11 RX ADMIN — PANTOPRAZOLE SODIUM 40 MILLIGRAM(S): 20 TABLET, DELAYED RELEASE ORAL at 05:56

## 2017-11-11 RX ADMIN — SENNA PLUS 2 TABLET(S): 8.6 TABLET ORAL at 22:43

## 2017-11-11 RX ADMIN — Medication 100 MILLIGRAM(S): at 22:43

## 2017-11-11 RX ADMIN — POLYETHYLENE GLYCOL 3350 17 GRAM(S): 17 POWDER, FOR SOLUTION ORAL at 05:56

## 2017-11-11 NOTE — PROGRESS NOTE ADULT - PROBLEM SELECTOR PLAN 4
Complicated by epigastric pain radiating to RUQ - Now improved, patient has mild RLQ which is well controlled on PRN  - will monitor pain response  - Tylenol PRN for mild to moderate pain  - Dilaudid 4mg PO q6h PRN for severe pain ( required 4 PRN's overnight)  - cont bowel regimen and monitor Complicated by epigastric pain radiating to RUQ - Now improved  - will monitor pain response  - Tylenol PRN for mild to moderate pain  - Dilaudid 4mg PO q6h PRN for severe pain ( required 4 PRN's overnight)  - cont bowel regimen and monitor

## 2017-11-11 NOTE — PROGRESS NOTE ADULT - PROBLEM SELECTOR PLAN 2
- Patient with HILTON this AM in setting of fever last night.  - Suspect pre-renal vs. UTI  - Will f/u UA/UCX  - Start IVF at 100cc/hr for 24hrs - Patient with HILTON this AM in setting of likely sepsis   - Suspect pre-renal vs. UTI  - Start IVF at 100cc/hr for 24hrs

## 2017-11-11 NOTE — PROGRESS NOTE ADULT - ATTENDING COMMENTS
Patient seen and examined.     Now s/p RP mass bx yesterday by IR w/ course now c/b suspected sepsis (fever, tachycardia) w/ associated HILTON. DDx high for abdominal pathology in light of recent procedure/ bx of RP mass and stent placement for hydronephrosis, UTI also on the differential. Pt w/o respiratory complaints or diarrhea.   -will start zosyn  -f/u blood cxs and f/u urine cx,   -IVFs  -will consider CT A/P  -monitor vitals, serial abdominal exams

## 2017-11-11 NOTE — PROGRESS NOTE ADULT - PROBLEM SELECTOR PLAN 3
Right RP mass with multiple arterially enhancing lesions at the hepatic dome found on CT A/P concerning for recurrence of pancreatic CA vs. new malignancy?   - CEA noted to be elevated to 6.3 in June  - will obtain tissue pathology per Heme/Onc recommendations  - RP mass biopsied by IR 11/9- results of pathology pending, c/b melena x2 on 11/10 with stable H/H.  - GI consulted for melena ( now resolved), suspect transient bleed for biopsies, no indication for EGD at this time  - Regular diet for now Right RP mass with multiple arterially enhancing lesions at the hepatic dome found on CT A/P concerning for recurrence of pancreatic CA vs. new malignancy?   - CEA noted to be elevated to 6.3 in June  - will obtain tissue pathology per Heme/Onc recommendations  - RP mass biopsied by IR 11/9- results of pathology pending, c/b melena x2 on 11/10 with stable H/H.  - GI consulted for melena ( now resolved), suspect transient bleed from biopsies, no indication for EGD at this time  - Regular diet for now

## 2017-11-11 NOTE — PROGRESS NOTE ADULT - PROBLEM SELECTOR PLAN 1
- Patient febrile overnight, Tmax 102, . Meeting SIRS criteria w/o clear source. qSOFA score 0  - Patient blood cultured but no UA, no UCX. Will obtain UA & UCX  - Started Zosyn empirically, will f/u cx and monitor fever curves.  - Will consider repeat CT A/P to assess RP mass/ureteral stent and possible hydronephrosis - Patient febrile overnight, Tmax 102, . Suspect sepsis . Currently no source. qSOFA score 1 (SBP<100)  - Patient blood cultured but no UA, no UCX. Will obtain UA & UCX  - Started Zosyn empirically, will f/u cx and monitor fever curves.  - Will consider repeat CT A/P to assess RP mass/ureteral stent and possible hydronephrosis

## 2017-11-11 NOTE — PROGRESS NOTE ADULT - PROBLEM SELECTOR PLAN 5
Patient with hydronephrosis of R kidney seen on CT A/P 2/2 right RP mass  - s/p ureteral stent placement, now resolved   - c/w tamsulosin & finasteride for likely BPH given enlarged prostate on CT A/P  - cont to monitor UOP  - outpatient Urology follow up for stent exchange in 3 months

## 2017-11-11 NOTE — PROGRESS NOTE ADULT - SUBJECTIVE AND OBJECTIVE BOX
Patient is a 72y old  Male who presents with a chief complaint of Came for complaints of Epigastric Pain radiating from the right side Abdomen to the left side, no nausea or vomiting (05 Nov 2017 09:51)      INTERVAL HPI/OVERNIGHT EVENTS: Overnight patient febrile to 102, bcx sent. Patient endorses fevers, chills, diaphoresis last night. Patient with improved Abdominal Pain, denies Nausea, vomiting, melena.    MEDICATIONS  (STANDING):  docusate sodium 100 milliGRAM(s) Oral three times a day  ferrous    sulfate 325 milliGRAM(s) Oral daily  finasteride 5 milliGRAM(s) Oral daily  piperacillin/tazobactam IVPB. 3.375 Gram(s) IV Intermittent every 8 hours  polyethylene glycol 3350 17 Gram(s) Oral two times a day  potassium acid phosphate/sodium acid phosphate tablet (K-PHOS No. 2) 1 Tablet(s) Oral four times a day with meals  senna 2 Tablet(s) Oral at bedtime  sodium chloride 0.9%. 1000 milliLiter(s) (75 mL/Hr) IV Continuous <Continuous>  tamsulosin 0.4 milliGRAM(s) Oral at bedtime    MEDICATIONS  (PRN):  acetaminophen   Tablet 650 milliGRAM(s) Oral every 6 hours PRN For Temp greater than 38 C (100.4 F)  acetaminophen   Tablet. 650 milliGRAM(s) Oral every 6 hours PRN Mild to moderate pain 1-5  HYDROmorphone   Tablet 4 milliGRAM(s) Oral every 6 hours PRN Severe Pain (7 - 10)  simethicone 80 milliGRAM(s) Chew every 6 hours PRN Dyspepsia      Allergies    No Known Allergies    Intolerances      Vital Signs Last 24 Hrs  T(C): 37.6 (11 Nov 2017 06:09), Max: 39 (10 Nov 2017 22:56)  T(F): 99.7 (11 Nov 2017 06:09), Max: 102.2 (10 Nov 2017 22:56)  HR: 92 (11 Nov 2017 06:09) (80 - 104)  BP: 127/85 (11 Nov 2017 06:09) (126/87 - 133/87)  BP(mean): --  RR: 18 (11 Nov 2017 06:09) (18 - 18)  SpO2: 100% (11 Nov 2017 06:09) (98% - 100%)  I&O's Summary    10 Nov 2017 07:01  -  11 Nov 2017 07:00  --------------------------------------------------------  IN: 100 mL / OUT: 500 mL / NET: -400 mL    11 Nov 2017 07:01  -  11 Nov 2017 09:51  --------------------------------------------------------  IN: 0 mL / OUT: 500 mL / NET: -500 mL        PHYSICAL EXAM:  GENERAL: NAD, well-groomed, well-developed  HEAD:  Atraumatic, Normocephalic  EYES: EOMI, PERRLA, conjunctiva and sclera clear  ENMT: No tonsillar erythema, exudates, or enlargement; Moist mucous membranes, Good dentition, No lesions  NECK: Supple, No JVD  NERVOUS SYSTEM:  Alert & Oriented X3, Good concentration; Motor Strength 5/5 B/L upper and lower extremities  CHEST/LUNG: Clear to auscultation bilaterally; No rales, rhonchi, wheezing, or rubs  HEART: Regular rate and rhythm; No murmurs, rubs, or gallops  ABDOMEN: Surgery site with dressing that is c/d/i - no discharge, tenderness or surrounding erythema. Bowel Sounds present, soft, abdomen nondistended, nontender, No CVA tenderness or flank pain. Chevron scar well healed, nondistended, no guarding, no rebound  EXTREMITIES:  2+ Peripheral Pulses, No clubbing, cyanosis, or edema  LYMPH: No lymphadenopathy noted  SKIN: No rashes or lesions    LABS:                        11.6   7.55  )-----------( 239      ( 11 Nov 2017 06:00 )             34.3     11-11    132<L>  |  95<L>  |  19  ----------------------------<  120<H>  4.4   |  23  |  1.33<H>    Ca    8.6      11 Nov 2017 06:00  Phos  2.0     11-11  Mg     1.9     11-11

## 2017-11-11 NOTE — PROGRESS NOTE ADULT - ASSESSMENT
72M with pancreatic adenocarcinoma (dx. 2015) s/p whipple s/p gemcitabine (x 6 cycles) & RT (completed 2016) , s/p Xeloda presents for 2-week history of abdominal pain found to have right RP mass on CT likely recurrence of pancreatic CA vs. new malignancy c/b R. ureteral compression , hydronephrosis  sepsis 2/2 pyelo requiring urgent ureteral stent placement by urology for decompression, now s/p RP mass biopsy on 11/9 c/b melena with stable H/H now febrile

## 2017-11-12 DIAGNOSIS — R78.81 BACTEREMIA: ICD-10-CM

## 2017-11-12 LAB
BUN SERPL-MCNC: 19 MG/DL — SIGNIFICANT CHANGE UP (ref 7–23)
CALCIUM SERPL-MCNC: 8.2 MG/DL — LOW (ref 8.4–10.5)
CHLORIDE SERPL-SCNC: 99 MMOL/L — SIGNIFICANT CHANGE UP (ref 98–107)
CO2 SERPL-SCNC: 22 MMOL/L — SIGNIFICANT CHANGE UP (ref 22–31)
CREAT SERPL-MCNC: 1.31 MG/DL — HIGH (ref 0.5–1.3)
GLUCOSE SERPL-MCNC: 103 MG/DL — HIGH (ref 70–99)
HCT VFR BLD CALC: 29.4 % — LOW (ref 39–50)
HGB BLD-MCNC: 9.6 G/DL — LOW (ref 13–17)
MAGNESIUM SERPL-MCNC: 2.1 MG/DL — SIGNIFICANT CHANGE UP (ref 1.6–2.6)
MCHC RBC-ENTMCNC: 30.1 PG — SIGNIFICANT CHANGE UP (ref 27–34)
MCHC RBC-ENTMCNC: 32.7 % — SIGNIFICANT CHANGE UP (ref 32–36)
MCV RBC AUTO: 92.2 FL — SIGNIFICANT CHANGE UP (ref 80–100)
NRBC # FLD: 0 — SIGNIFICANT CHANGE UP
ORGANISM # SPEC MICROSCOPIC CNT: SIGNIFICANT CHANGE UP
ORGANISM # SPEC MICROSCOPIC CNT: SIGNIFICANT CHANGE UP
PHOSPHATE SERPL-MCNC: 2.7 MG/DL — SIGNIFICANT CHANGE UP (ref 2.5–4.5)
PLATELET # BLD AUTO: 232 K/UL — SIGNIFICANT CHANGE UP (ref 150–400)
PMV BLD: 10.7 FL — SIGNIFICANT CHANGE UP (ref 7–13)
POTASSIUM SERPL-MCNC: 4.2 MMOL/L — SIGNIFICANT CHANGE UP (ref 3.5–5.3)
POTASSIUM SERPL-SCNC: 4.2 MMOL/L — SIGNIFICANT CHANGE UP (ref 3.5–5.3)
RBC # BLD: 3.19 M/UL — LOW (ref 4.2–5.8)
RBC # FLD: 13.6 % — SIGNIFICANT CHANGE UP (ref 10.3–14.5)
SODIUM SERPL-SCNC: 134 MMOL/L — LOW (ref 135–145)
SPECIMEN SOURCE: SIGNIFICANT CHANGE UP
SPECIMEN SOURCE: SIGNIFICANT CHANGE UP
WBC # BLD: 4.78 K/UL — SIGNIFICANT CHANGE UP (ref 3.8–10.5)
WBC # FLD AUTO: 4.78 K/UL — SIGNIFICANT CHANGE UP (ref 3.8–10.5)

## 2017-11-12 PROCEDURE — 99233 SBSQ HOSP IP/OBS HIGH 50: CPT

## 2017-11-12 RX ORDER — SODIUM CHLORIDE 9 MG/ML
1000 INJECTION INTRAMUSCULAR; INTRAVENOUS; SUBCUTANEOUS
Qty: 0 | Refills: 0 | Status: DISCONTINUED | OUTPATIENT
Start: 2017-11-12 | End: 2017-11-13

## 2017-11-12 RX ORDER — HYDROMORPHONE HYDROCHLORIDE 2 MG/ML
4 INJECTION INTRAMUSCULAR; INTRAVENOUS; SUBCUTANEOUS ONCE
Qty: 0 | Refills: 0 | Status: DISCONTINUED | OUTPATIENT
Start: 2017-11-12 | End: 2017-11-12

## 2017-11-12 RX ADMIN — POLYETHYLENE GLYCOL 3350 17 GRAM(S): 17 POWDER, FOR SOLUTION ORAL at 05:11

## 2017-11-12 RX ADMIN — PIPERACILLIN AND TAZOBACTAM 25 GRAM(S): 4; .5 INJECTION, POWDER, LYOPHILIZED, FOR SOLUTION INTRAVENOUS at 23:03

## 2017-11-12 RX ADMIN — Medication 100 MILLIGRAM(S): at 13:26

## 2017-11-12 RX ADMIN — FINASTERIDE 5 MILLIGRAM(S): 5 TABLET, FILM COATED ORAL at 13:25

## 2017-11-12 RX ADMIN — HYDROMORPHONE HYDROCHLORIDE 6 MILLIGRAM(S): 2 INJECTION INTRAMUSCULAR; INTRAVENOUS; SUBCUTANEOUS at 23:32

## 2017-11-12 RX ADMIN — HYDROMORPHONE HYDROCHLORIDE 6 MILLIGRAM(S): 2 INJECTION INTRAMUSCULAR; INTRAVENOUS; SUBCUTANEOUS at 05:11

## 2017-11-12 RX ADMIN — HYDROMORPHONE HYDROCHLORIDE 6 MILLIGRAM(S): 2 INJECTION INTRAMUSCULAR; INTRAVENOUS; SUBCUTANEOUS at 05:41

## 2017-11-12 RX ADMIN — Medication 650 MILLIGRAM(S): at 07:20

## 2017-11-12 RX ADMIN — POLYETHYLENE GLYCOL 3350 17 GRAM(S): 17 POWDER, FOR SOLUTION ORAL at 17:11

## 2017-11-12 RX ADMIN — SODIUM CHLORIDE 100 MILLILITER(S): 9 INJECTION INTRAMUSCULAR; INTRAVENOUS; SUBCUTANEOUS at 18:34

## 2017-11-12 RX ADMIN — PIPERACILLIN AND TAZOBACTAM 25 GRAM(S): 4; .5 INJECTION, POWDER, LYOPHILIZED, FOR SOLUTION INTRAVENOUS at 13:25

## 2017-11-12 RX ADMIN — HYDROMORPHONE HYDROCHLORIDE 6 MILLIGRAM(S): 2 INJECTION INTRAMUSCULAR; INTRAVENOUS; SUBCUTANEOUS at 11:40

## 2017-11-12 RX ADMIN — Medication 650 MILLIGRAM(S): at 09:50

## 2017-11-12 RX ADMIN — Medication 325 MILLIGRAM(S): at 13:26

## 2017-11-12 RX ADMIN — TAMSULOSIN HYDROCHLORIDE 0.4 MILLIGRAM(S): 0.4 CAPSULE ORAL at 23:05

## 2017-11-12 RX ADMIN — Medication 650 MILLIGRAM(S): at 07:50

## 2017-11-12 RX ADMIN — HYDROMORPHONE HYDROCHLORIDE 6 MILLIGRAM(S): 2 INJECTION INTRAMUSCULAR; INTRAVENOUS; SUBCUTANEOUS at 23:02

## 2017-11-12 RX ADMIN — PIPERACILLIN AND TAZOBACTAM 25 GRAM(S): 4; .5 INJECTION, POWDER, LYOPHILIZED, FOR SOLUTION INTRAVENOUS at 05:11

## 2017-11-12 RX ADMIN — Medication 100 MILLIGRAM(S): at 05:11

## 2017-11-12 RX ADMIN — HYDROMORPHONE HYDROCHLORIDE 6 MILLIGRAM(S): 2 INJECTION INTRAMUSCULAR; INTRAVENOUS; SUBCUTANEOUS at 10:40

## 2017-11-12 NOTE — PROGRESS NOTE ADULT - ATTENDING COMMENTS
Patient seen and examined.     Now s/p RP mass bx by IR w/ course now c/b sepsis 2/2 E.Coli bacteremia. DDx high for abdominal pathology in light of recent procedure/ bx of RP mass and stent placement for hydronephrosis. Pt w/o respiratory complaints or diarrhea.   -c/w zosyn, if patient decompensates would broaded abx coverage   -f/u blood cxs sensitivities and f/u urine cx,   -c/w IVFs for sepsis and HILTON  -Patient w/ abdominal pain that has been on and off since admission. Will consider CT A/P if abdominal exam worsens  -monitor vitals, serial abdominal exams

## 2017-11-12 NOTE — PROGRESS NOTE ADULT - PROBLEM SELECTOR PLAN 4
Epigastric pain that he states is similar in nature and intensity to his chronic pain - not at surgery site  - will monitor pain response  - Tylenol PRN for mild to moderate pain  - Dilaudid 6mg PO q6h PRN for severe pain ( required 2 PRN's overnight)  - cont bowel regimen and monitor

## 2017-11-12 NOTE — PROGRESS NOTE ADULT - PROBLEM SELECTOR PLAN 3
Right RP mass with multiple arterially enhancing lesions at the hepatic dome found on CT A/P concerning for recurrence of pancreatic CA vs. new malignancy?   - CEA noted to be elevated to 6.3 in June  - will f/u tissue pathology per Heme/Onc recommendations  - RP mass biopsied by IR 11/9- results of pathology pending, c/b melena x2 on 11/10 with stable H/H.  - GI consulted for melena (now resolved), suspect transient bleed from biopsies, no indication for EGD at this time  - Regular diet for now

## 2017-11-12 NOTE — PROGRESS NOTE ADULT - PROBLEM SELECTOR PLAN 2
- Patient with HILTON this AM in setting of likely sepsis   - Suspect pre-renal vs. UTI  - Start IVF at 100cc/hr for 24hrs Patient developed HILTON yesterday AM that still persists this AM but with slight d/t to 1.31  - Suspect pre-renal vs. UTI  - c/w IVF at 100cc/hr for 24hrs Patient developed HILTON yesterday AM that still persists this AM but with slight d/t to 1.31. Suspect prerenal in setting of sepsis   - c/w IVF at 100cc/hr

## 2017-11-12 NOTE — PROGRESS NOTE ADULT - SUBJECTIVE AND OBJECTIVE BOX
Patient is a 72y old  Male who presents with a chief complaint of Came for complaints of Epigastric Pain radiating from the right side Abdomen to the left side, no nausea or vomiting (2017 09:51)      SUBJECTIVE / OVERNIGHT EVENTS: Patient with low grade fever this morning with temperature of 100.7. Complaining of his chronic abdominal pain that he experiences in his epigastric area (not at site of recent surgery). Denies SOB, CP, palps, diaphoresis, hematochezia, melena, n/c/v/d/c. Had BM yesterday that was loose and brown.      MEDICATIONS  (STANDING):  docusate sodium 100 milliGRAM(s) Oral three times a day  ferrous    sulfate 325 milliGRAM(s) Oral daily  finasteride 5 milliGRAM(s) Oral daily  piperacillin/tazobactam IVPB. 3.375 Gram(s) IV Intermittent every 8 hours  polyethylene glycol 3350 17 Gram(s) Oral two times a day  senna 2 Tablet(s) Oral at bedtime  sodium chloride 0.9%. 1000 milliLiter(s) (100 mL/Hr) IV Continuous <Continuous>  tamsulosin 0.4 milliGRAM(s) Oral at bedtime    MEDICATIONS  (PRN):  acetaminophen   Tablet 650 milliGRAM(s) Oral every 6 hours PRN For Temp greater than 38 C (100.4 F)  acetaminophen   Tablet. 650 milliGRAM(s) Oral every 6 hours PRN Mild to moderate pain 1-5  HYDROmorphone   Tablet 6 milliGRAM(s) Oral every 6 hours PRN Severe Pain (6 - 10)  simethicone 80 milliGRAM(s) Chew every 6 hours PRN Dyspepsia        CAPILLARY BLOOD GLUCOSE        I&O's Summary    2017 07:  -  2017 07:00  --------------------------------------------------------  IN: 1250 mL / OUT: 1000 mL / NET: 250 mL    2017 07:  -  2017 09:21  --------------------------------------------------------  IN: 0 mL / OUT: 500 mL / NET: -500 mL        PHYSICAL EXAM:  VS: T 98.3, HR 72, /83, RR 18, SpO2 98% on RA  GENERAL: NAD, well-groomed, well-developed  HEAD:  Atraumatic, Normocephalic  EYES: EOMI, PERRLA, conjunctiva and sclera clear  ENMT: No tonsillar erythema, exudates, or enlargement; Moist mucous membranes, Good dentition, No lesions  NECK: Supple, No JVD  NERVOUS SYSTEM:  Alert & Oriented X3, Good concentration; Motor Strength 5/5 B/L upper and lower extremities  CHEST/LUNG: Clear to auscultation bilaterally; No rales, rhonchi, wheezing, or rubs  HEART: Regular rate and rhythm; No murmurs, rubs, or gallops  ABDOMEN: Surgery site with dressing that is c/d/i - no discharge, tenderness or surrounding erythema. Bowel Sounds present, soft, abdomen nondistended, mildly tender in epigastric area (not at surgery site), No CVA tenderness or flank pain. Chevron scar well healed, nondistended, no guarding, no rebound  EXTREMITIES:  2+ Peripheral Pulses, No clubbing, cyanosis, or edema  LYMPH: No lymphadenopathy noted  SKIN: No rashes or lesions    LABS:                        9.6    4.78  )-----------( 232      ( 2017 06:20 )             29.4     11-12    134<L>  |  99  |  19  ----------------------------<  103<H>  4.2   |  22  |  1.31<H>    Ca    8.2<L>      2017 06:20  Phos  2.7     11-12  Mg     2.1     11-12            Urinalysis Basic - ( 2017 09:30 )    Color: YELLOW / Appearance: HAZY / S.018 / pH: 6.0  Gluc: 250 / Ketone: SMALL  / Bili: SMALL / Urobili: 4 E.U.   Blood: TRACE / Protein: 30 / Nitrite: NEGATIVE   Leuk Esterase: SMALL / RBC: 5-10 / WBC 5-10   Sq Epi: OCC / Non Sq Epi: x / Bacteria: FEW        RADIOLOGY & ADDITIONAL TESTS:    No new additional studies.

## 2017-11-12 NOTE — PROGRESS NOTE ADULT - ASSESSMENT
72M with pancreatic adenocarcinoma (dx. 2015) s/p whipple s/p gemcitabine (x 6 cycles) & RT (completed 2016) , s/p Xeloda presents for 2-week history of abdominal pain found to have right RP mass on CT likely recurrence of pancreatic CA vs. new malignancy c/b R. ureteral compression , hydronephrosis  sepsis 2/2 pyelo requiring urgent ureteral stent placement by urology for decompression, now s/p RP mass biopsy on 11/9 c/b melena with stable H/H now febrile 72M with pancreatic adenocarcinoma (dx. 2015) s/p whipple s/p gemcitabine (x 6 cycles) & RT (completed 2016) , s/p Xeloda presents for 2-week history of abdominal pain found to have right RP mass on CT likely recurrence of pancreatic CA vs. new malignancy c/b R. ureteral compression , hydronephrosis  sepsis 2/2 pyelo requiring urgent ureteral stent placement by urology for decompression, now s/p RP mass biopsy on 11/9 c/b melena with stable H/H now febrile and found to have e. coli bacteremia. 72M with pancreatic adenocarcinoma (dx. 2015) s/p whipple s/p gemcitabine (x 6 cycles) & RT (completed 2016) , s/p Xeloda presents for 2-week history of abdominal pain found to have right RP mass on CT likely recurrence of pancreatic CA vs. new malignancy c/b R. ureteral compression , hydronephrosis  sepsis 2/2 pyelo requiring urgent ureteral stent placement by urology for decompression, now s/p RP mass biopsy on 11/9 c/b melena with stable H/H now, course c/b sepsis 2/2 e. coli bacteremia.

## 2017-11-12 NOTE — PROGRESS NOTE ADULT - PROBLEM SELECTOR PLAN 1
- Patient febrile overnight, Tmax 102, . Also with fever this AM to 100.7. Suspect sepsis . Currently no source. qSOFA score 1 (SBP<100)  - Patient blood cultured but no UA, no UCX. Will obtain UA & UCX  - c/w Zosyn for now; will f/u cx and monitor fever curves.  - Will consider repeat CT A/P to assess RP mass/ureteral stent and possible hydronephrosis Patient febrile overnight, Tmax 102, . Also with fever this AM to 100.7. BCx from yesterday growing E. Coli Currently no source. qSOFA score 1 (SBP<100)  - UA with small leuk est but negative nitrite  - f/u urine cultures  - c/w Zosyn for now; will f/u cx and monitor fever curves.  - Will consider repeat CT A/P to assess RP mass/ureteral stent and possible hydronephrosis Patient febrile overnight, Tmax 102, . Also with fever this AM to 100.7. BCx from yesterday growing E. Coli. Currently no source but most likely 2/2 recent IR procedure as operation was done by going through bowel. qSOFA score 1 (SBP<100)  - UA with small leuk est but negative nitrite  - f/u urine cultures  - c/w Zosyn for now; will f/u cx and monitor fever curves.  - Will consider repeat CT A/P to assess RP mass/ureteral stent and possible hydronephrosis but not at this time due to current [place]IL[place] Patient febrile overnight to 100.7. BCx from yesterday growing E. Coli. Currently no source but most likely 2/2 recent IR procedure as operation was done by going through bowel. qSOFA score 1 (SBP<100)  - UA with small leuk est but negative nitrite  - f/u urine cultures  - c/w Zosyn for now; will f/u cx and monitor fever curves.  - Will consider repeat CT A/P to assess RP mass/ureteral stent and for intraabdominal pathology

## 2017-11-12 NOTE — PROGRESS NOTE ADULT - PROBLEM SELECTOR PLAN 5
Patient with hydronephrosis of R kidney seen on CT A/P 2/2 right RP mass  - s/p ureteral stent placement, now resolved   - c/w tamsulosin & finasteride for likely BPH given enlarged prostate on CT A/P  - cont to monitor UOP  - outpatient Urology follow up for stent exchange in 3 months Patient with hydronephrosis of R kidney seen on CT A/P 2/2 right RP mass  - s/p ureteral stent placement, now resolved   - c/w tamsulosin & finasteride for likely BPH given enlarged prostate on CT A/P  - cont to monitor UOP  -pt will need outpatient Urology follow up for stent exchange ~ q 3 months

## 2017-11-13 ENCOUNTER — OUTPATIENT (OUTPATIENT)
Dept: OUTPATIENT SERVICES | Facility: HOSPITAL | Age: 72
LOS: 1 days | Discharge: HOME | End: 2017-11-13

## 2017-11-13 DIAGNOSIS — D64.9 ANEMIA, UNSPECIFIED: ICD-10-CM

## 2017-11-13 DIAGNOSIS — C25.9 MALIGNANT NEOPLASM OF PANCREAS, UNSPECIFIED: Chronic | ICD-10-CM

## 2017-11-13 DIAGNOSIS — C25.9 MALIGNANT NEOPLASM OF PANCREAS, UNSPECIFIED: ICD-10-CM

## 2017-11-13 LAB
-  AMIKACIN: SIGNIFICANT CHANGE UP
-  AMIKACIN: SIGNIFICANT CHANGE UP
-  AMPICILLIN/SULBACTAM: SIGNIFICANT CHANGE UP
-  AMPICILLIN/SULBACTAM: SIGNIFICANT CHANGE UP
-  AMPICILLIN: SIGNIFICANT CHANGE UP
-  AMPICILLIN: SIGNIFICANT CHANGE UP
-  AZTREONAM: SIGNIFICANT CHANGE UP
-  AZTREONAM: SIGNIFICANT CHANGE UP
-  CEFAZOLIN: SIGNIFICANT CHANGE UP
-  CEFAZOLIN: SIGNIFICANT CHANGE UP
-  CEFEPIME: SIGNIFICANT CHANGE UP
-  CEFEPIME: SIGNIFICANT CHANGE UP
-  CEFOXITIN: SIGNIFICANT CHANGE UP
-  CEFOXITIN: SIGNIFICANT CHANGE UP
-  CEFTAZIDIME: SIGNIFICANT CHANGE UP
-  CEFTAZIDIME: SIGNIFICANT CHANGE UP
-  CEFTRIAXONE: SIGNIFICANT CHANGE UP
-  CEFTRIAXONE: SIGNIFICANT CHANGE UP
-  CIPROFLOXACIN: SIGNIFICANT CHANGE UP
-  CIPROFLOXACIN: SIGNIFICANT CHANGE UP
-  ERTAPENEM: SIGNIFICANT CHANGE UP
-  ERTAPENEM: SIGNIFICANT CHANGE UP
-  GENTAMICIN: SIGNIFICANT CHANGE UP
-  GENTAMICIN: SIGNIFICANT CHANGE UP
-  IMIPENEM: SIGNIFICANT CHANGE UP
-  IMIPENEM: SIGNIFICANT CHANGE UP
-  LEVOFLOXACIN: SIGNIFICANT CHANGE UP
-  LEVOFLOXACIN: SIGNIFICANT CHANGE UP
-  MEROPENEM: SIGNIFICANT CHANGE UP
-  MEROPENEM: SIGNIFICANT CHANGE UP
-  NITROFURANTOIN: SIGNIFICANT CHANGE UP
-  PIPERACILLIN/TAZOBACTAM: SIGNIFICANT CHANGE UP
-  PIPERACILLIN/TAZOBACTAM: SIGNIFICANT CHANGE UP
-  TIGECYCLINE: SIGNIFICANT CHANGE UP
-  TIGECYCLINE: SIGNIFICANT CHANGE UP
-  TOBRAMYCIN: SIGNIFICANT CHANGE UP
-  TOBRAMYCIN: SIGNIFICANT CHANGE UP
-  TRIMETHOPRIM/SULFAMETHOXAZOLE: SIGNIFICANT CHANGE UP
-  TRIMETHOPRIM/SULFAMETHOXAZOLE: SIGNIFICANT CHANGE UP
ALBUMIN SERPL ELPH-MCNC: 2.9 G/DL — LOW (ref 3.3–5)
ALP SERPL-CCNC: 335 U/L — HIGH (ref 40–120)
ALT FLD-CCNC: 40 U/L — SIGNIFICANT CHANGE UP (ref 4–41)
AST SERPL-CCNC: 35 U/L — SIGNIFICANT CHANGE UP (ref 4–40)
BACTERIA BLD CULT: SIGNIFICANT CHANGE UP
BACTERIA UR CULT: SIGNIFICANT CHANGE UP
BASOPHILS # BLD AUTO: 0.02 K/UL — SIGNIFICANT CHANGE UP (ref 0–0.2)
BASOPHILS NFR BLD AUTO: 0.4 % — SIGNIFICANT CHANGE UP (ref 0–2)
BILIRUB SERPL-MCNC: 2.4 MG/DL — HIGH (ref 0.2–1.2)
BLD GP AB SCN SERPL QL: NEGATIVE — SIGNIFICANT CHANGE UP
BUN SERPL-MCNC: 17 MG/DL — SIGNIFICANT CHANGE UP (ref 7–23)
CALCIUM SERPL-MCNC: 8.2 MG/DL — LOW (ref 8.4–10.5)
CHLORIDE SERPL-SCNC: 102 MMOL/L — SIGNIFICANT CHANGE UP (ref 98–107)
CO2 SERPL-SCNC: 23 MMOL/L — SIGNIFICANT CHANGE UP (ref 22–31)
CREAT SERPL-MCNC: 1.3 MG/DL — SIGNIFICANT CHANGE UP (ref 0.5–1.3)
E COLI DNA BLD POS QL NAA+NON-PROBE: SIGNIFICANT CHANGE UP
EOSINOPHIL # BLD AUTO: 0.07 K/UL — SIGNIFICANT CHANGE UP (ref 0–0.5)
EOSINOPHIL NFR BLD AUTO: 1.4 % — SIGNIFICANT CHANGE UP (ref 0–6)
GLUCOSE SERPL-MCNC: 120 MG/DL — HIGH (ref 70–99)
HCT VFR BLD CALC: 28.2 % — LOW (ref 39–50)
HGB BLD-MCNC: 9.3 G/DL — LOW (ref 13–17)
IMM GRANULOCYTES # BLD AUTO: 0.02 # — SIGNIFICANT CHANGE UP
IMM GRANULOCYTES NFR BLD AUTO: 0.4 % — SIGNIFICANT CHANGE UP (ref 0–1.5)
LYMPHOCYTES # BLD AUTO: 0.34 K/UL — LOW (ref 1–3.3)
LYMPHOCYTES # BLD AUTO: 6.6 % — LOW (ref 13–44)
MAGNESIUM SERPL-MCNC: 2.1 MG/DL — SIGNIFICANT CHANGE UP (ref 1.6–2.6)
MCHC RBC-ENTMCNC: 30.1 PG — SIGNIFICANT CHANGE UP (ref 27–34)
MCHC RBC-ENTMCNC: 33 % — SIGNIFICANT CHANGE UP (ref 32–36)
MCV RBC AUTO: 91.3 FL — SIGNIFICANT CHANGE UP (ref 80–100)
METHOD TYPE: SIGNIFICANT CHANGE UP
METHOD TYPE: SIGNIFICANT CHANGE UP
MONOCYTES # BLD AUTO: 0.4 K/UL — SIGNIFICANT CHANGE UP (ref 0–0.9)
MONOCYTES NFR BLD AUTO: 7.7 % — SIGNIFICANT CHANGE UP (ref 2–14)
NEUTROPHILS # BLD AUTO: 4.33 K/UL — SIGNIFICANT CHANGE UP (ref 1.8–7.4)
NEUTROPHILS NFR BLD AUTO: 83.5 % — HIGH (ref 43–77)
NRBC # FLD: 0 — SIGNIFICANT CHANGE UP
ORGANISM # SPEC MICROSCOPIC CNT: SIGNIFICANT CHANGE UP
ORGANISM # SPEC MICROSCOPIC CNT: SIGNIFICANT CHANGE UP
PHOSPHATE SERPL-MCNC: 2.8 MG/DL — SIGNIFICANT CHANGE UP (ref 2.5–4.5)
PLATELET # BLD AUTO: 183 K/UL — SIGNIFICANT CHANGE UP (ref 150–400)
PMV BLD: 10.6 FL — SIGNIFICANT CHANGE UP (ref 7–13)
POTASSIUM SERPL-MCNC: 4.5 MMOL/L — SIGNIFICANT CHANGE UP (ref 3.5–5.3)
POTASSIUM SERPL-SCNC: 4.5 MMOL/L — SIGNIFICANT CHANGE UP (ref 3.5–5.3)
PROT SERPL-MCNC: 5.6 G/DL — LOW (ref 6–8.3)
RBC # BLD: 3.09 M/UL — LOW (ref 4.2–5.8)
RBC # FLD: 13.9 % — SIGNIFICANT CHANGE UP (ref 10.3–14.5)
RH IG SCN BLD-IMP: POSITIVE — SIGNIFICANT CHANGE UP
SODIUM SERPL-SCNC: 136 MMOL/L — SIGNIFICANT CHANGE UP (ref 135–145)
SPECIMEN SOURCE: SIGNIFICANT CHANGE UP
WBC # BLD: 5.18 K/UL — SIGNIFICANT CHANGE UP (ref 3.8–10.5)
WBC # FLD AUTO: 5.18 K/UL — SIGNIFICANT CHANGE UP (ref 3.8–10.5)

## 2017-11-13 PROCEDURE — 99233 SBSQ HOSP IP/OBS HIGH 50: CPT | Mod: GC

## 2017-11-13 PROCEDURE — 74177 CT ABD & PELVIS W/CONTRAST: CPT | Mod: 26

## 2017-11-13 RX ORDER — SODIUM CHLORIDE 9 MG/ML
1000 INJECTION INTRAMUSCULAR; INTRAVENOUS; SUBCUTANEOUS
Qty: 0 | Refills: 0 | Status: DISCONTINUED | OUTPATIENT
Start: 2017-11-13 | End: 2017-11-13

## 2017-11-13 RX ORDER — ACETAMINOPHEN 500 MG
650 TABLET ORAL ONCE
Qty: 0 | Refills: 0 | Status: COMPLETED | OUTPATIENT
Start: 2017-11-13 | End: 2017-11-13

## 2017-11-13 RX ORDER — SODIUM CHLORIDE 9 MG/ML
1000 INJECTION INTRAMUSCULAR; INTRAVENOUS; SUBCUTANEOUS
Qty: 0 | Refills: 0 | Status: DISCONTINUED | OUTPATIENT
Start: 2017-11-13 | End: 2017-11-14

## 2017-11-13 RX ORDER — HYDROMORPHONE HYDROCHLORIDE 2 MG/ML
1 INJECTION INTRAMUSCULAR; INTRAVENOUS; SUBCUTANEOUS EVERY 6 HOURS
Qty: 0 | Refills: 0 | Status: DISCONTINUED | OUTPATIENT
Start: 2017-11-13 | End: 2017-11-16

## 2017-11-13 RX ORDER — CEFTRIAXONE 500 MG/1
1 INJECTION, POWDER, FOR SOLUTION INTRAMUSCULAR; INTRAVENOUS EVERY 24 HOURS
Qty: 0 | Refills: 0 | Status: DISCONTINUED | OUTPATIENT
Start: 2017-11-13 | End: 2017-11-14

## 2017-11-13 RX ADMIN — SENNA PLUS 2 TABLET(S): 8.6 TABLET ORAL at 22:38

## 2017-11-13 RX ADMIN — CEFTRIAXONE 100 GRAM(S): 500 INJECTION, POWDER, FOR SOLUTION INTRAMUSCULAR; INTRAVENOUS at 18:42

## 2017-11-13 RX ADMIN — Medication 100 MILLIGRAM(S): at 22:38

## 2017-11-13 RX ADMIN — PIPERACILLIN AND TAZOBACTAM 25 GRAM(S): 4; .5 INJECTION, POWDER, LYOPHILIZED, FOR SOLUTION INTRAVENOUS at 13:41

## 2017-11-13 RX ADMIN — HYDROMORPHONE HYDROCHLORIDE 6 MILLIGRAM(S): 2 INJECTION INTRAMUSCULAR; INTRAVENOUS; SUBCUTANEOUS at 06:19

## 2017-11-13 RX ADMIN — HYDROMORPHONE HYDROCHLORIDE 1 MILLIGRAM(S): 2 INJECTION INTRAMUSCULAR; INTRAVENOUS; SUBCUTANEOUS at 18:57

## 2017-11-13 RX ADMIN — Medication 100 MILLIGRAM(S): at 13:41

## 2017-11-13 RX ADMIN — Medication 100 MILLIGRAM(S): at 05:48

## 2017-11-13 RX ADMIN — FINASTERIDE 5 MILLIGRAM(S): 5 TABLET, FILM COATED ORAL at 13:42

## 2017-11-13 RX ADMIN — Medication 650 MILLIGRAM(S): at 19:50

## 2017-11-13 RX ADMIN — Medication 325 MILLIGRAM(S): at 13:41

## 2017-11-13 RX ADMIN — SODIUM CHLORIDE 125 MILLILITER(S): 9 INJECTION INTRAMUSCULAR; INTRAVENOUS; SUBCUTANEOUS at 13:42

## 2017-11-13 RX ADMIN — PIPERACILLIN AND TAZOBACTAM 25 GRAM(S): 4; .5 INJECTION, POWDER, LYOPHILIZED, FOR SOLUTION INTRAVENOUS at 05:49

## 2017-11-13 RX ADMIN — TAMSULOSIN HYDROCHLORIDE 0.4 MILLIGRAM(S): 0.4 CAPSULE ORAL at 22:38

## 2017-11-13 RX ADMIN — HYDROMORPHONE HYDROCHLORIDE 1 MILLIGRAM(S): 2 INJECTION INTRAMUSCULAR; INTRAVENOUS; SUBCUTANEOUS at 18:42

## 2017-11-13 RX ADMIN — HYDROMORPHONE HYDROCHLORIDE 6 MILLIGRAM(S): 2 INJECTION INTRAMUSCULAR; INTRAVENOUS; SUBCUTANEOUS at 05:49

## 2017-11-13 NOTE — PROGRESS NOTE ADULT - SUBJECTIVE AND OBJECTIVE BOX
Patient is a 72y old  Male who presents with a chief complaint of Came for complaints of Epigastric Pain radiating from the right side Abdomen to the left side, no nausea or vomiting (05 Nov 2017 09:51)      SUBJECTIVE / OVERNIGHT EVENTS:  No BM. R sided abdominal pain improved.   MEDICATIONS  (STANDING):  docusate sodium 100 milliGRAM(s) Oral three times a day  ferrous    sulfate 325 milliGRAM(s) Oral daily  finasteride 5 milliGRAM(s) Oral daily  piperacillin/tazobactam IVPB. 3.375 Gram(s) IV Intermittent every 8 hours  polyethylene glycol 3350 17 Gram(s) Oral two times a day  senna 2 Tablet(s) Oral at bedtime  tamsulosin 0.4 milliGRAM(s) Oral at bedtime    MEDICATIONS  (PRN):  acetaminophen   Tablet 650 milliGRAM(s) Oral every 6 hours PRN For Temp greater than 38 C (100.4 F)  acetaminophen   Tablet. 650 milliGRAM(s) Oral every 6 hours PRN Mild to moderate pain 1-5  HYDROmorphone   Tablet 6 milliGRAM(s) Oral every 6 hours PRN Severe Pain (6 - 10)  simethicone 80 milliGRAM(s) Chew every 6 hours PRN Dyspepsia        CAPILLARY BLOOD GLUCOSE        I&O's Summary    12 Nov 2017 07:01  -  13 Nov 2017 07:00  --------------------------------------------------------  IN: 820 mL / OUT: 500 mL / NET: 320 mL        ROS:    Neuro: No headache  MSK: No back pain  Cardiac: No chest pain, palpitations  Pulmonary: No SOB or cough  GI: As per HPI  : No dysuria or hesitancy  Skin: No rash or pruritis      PHYSICAL EXAM:  GENERAL: NAD, well-developed  HEAD:  Atraumatic, Normocephalic  EYES: EOMI, PERRLA, conjunctiva and sclera anicteric  NECK: Supple, No JVD  CHEST/LUNG: Clear to auscultation bilaterally; No wheeze  HEART: Regular rate and rhythm; No murmurs, rubs, or gallops  ABDOMEN: Soft, R side of abdomen tender, Nondistended; Bowel sounds present, no hepatosplenomegaly, no rebound or guarding  EXTREMITIES:  2+ Peripheral Pulses, No clubbing, cyanosis, or edema  PSYCH: AAOx3  NEUROLOGY: non-focal, no asterixis  SKIN: No rashes or lesions, no palmar erythema or Dupuytren's contracture, no gynecomastia  RECTUM: brown stool    LABS:                        9.3    5.18  )-----------( 183      ( 13 Nov 2017 05:50 )             28.2     11-13    136  |  102  |  17  ----------------------------<  120<H>  4.5   |  23  |  1.30    Ca    8.2<L>      13 Nov 2017 05:50  Phos  2.8     11-13  Mg     2.1     11-13    TPro  5.6<L>  /  Alb  2.9<L>  /  TBili  2.4<H>  /  DBili  x   /  AST  35  /  ALT  40  /  AlkPhos  335<H>  11-13    LIVER FUNCTIONS - ( 13 Nov 2017 05:50 )  Alb: 2.9 g/dL / Pro: 5.6 g/dL / ALK PHOS: 335 u/L / ALT: 40 u/L / AST: 35 u/L / GGT: x                     RADIOLOGY & ADDITIONAL TESTS:

## 2017-11-13 NOTE — PROGRESS NOTE ADULT - ASSESSMENT
Impression:    1. Acute anemia: Unclear source of bleeding, about 1.5 g hgb loss. Brown stool on rectal exam and had EGD a few days ago. Could be from IR procedure.    2. Elevated bilirubin : Could be due to cholestasis of sepsis vs. a primary biliary sepsis    3. RP mass s/p biopsy    4. Ureteral obstruction s/p stenting    Recommendation:  -check abdominal US to r/o biliary obstruction  -trend Hgb  -protonix 40 IV BID Impression:    1. Acute anemia: Unclear source of bleeding, about 1.5 g hgb loss. Brown stool on rectal exam and had EGD a few days ago. Could be from IR procedure.    2. Elevated bilirubin : Could be due to cholestasis of sepsis vs. a primary biliary sepsis    3. RP mass s/p biopsy    4. Ureteral obstruction s/p stenting    Recommendation:  -check abdominal US to r/o biliary obstruction  -trend Hgb  -protonix 40 IV BID  -check direct bilirubin Impression:    1. Acute anemia: Unclear source of bleeding, about 1.5 g hgb loss. No overt GI bleeding: Brown stool on rectal exam and had EGD a few days ago. Could be from IR procedure.    2. Elevated bilirubin : Could be due to cholestasis of sepsis vs. a primary biliary sepsis    3. RP mass s/p biopsy    4. Ureteral obstruction s/p stenting    Recommendation:  -agree with CT abdomen and pelvis  -trend Hgb  -protonix 40 IV BID  -check direct bilirubin  -if biliary issue seen on CT please contact GI team

## 2017-11-13 NOTE — PROGRESS NOTE ADULT - SUBJECTIVE AND OBJECTIVE BOX
Patient is a 72y old  Male who presents with a chief complaint of Came for complaints of Epigastric Pain radiating from the right side Abdomen to the left side, no nausea or vomiting (2017 09:51)      SUBJECTIVE / OVERNIGHT EVENTS:    MEDICATIONS  (STANDING):  docusate sodium 100 milliGRAM(s) Oral three times a day  ferrous    sulfate 325 milliGRAM(s) Oral daily  finasteride 5 milliGRAM(s) Oral daily  piperacillin/tazobactam IVPB. 3.375 Gram(s) IV Intermittent every 8 hours  polyethylene glycol 3350 17 Gram(s) Oral two times a day  senna 2 Tablet(s) Oral at bedtime  tamsulosin 0.4 milliGRAM(s) Oral at bedtime    MEDICATIONS  (PRN):  acetaminophen   Tablet 650 milliGRAM(s) Oral every 6 hours PRN For Temp greater than 38 C (100.4 F)  acetaminophen   Tablet. 650 milliGRAM(s) Oral every 6 hours PRN Mild to moderate pain 1-5  HYDROmorphone   Tablet 6 milliGRAM(s) Oral every 6 hours PRN Severe Pain (6 - 10)  simethicone 80 milliGRAM(s) Chew every 6 hours PRN Dyspepsia        CAPILLARY BLOOD GLUCOSE        I&O's Summary    2017 07:01  -  2017 07:00  --------------------------------------------------------  IN: 820 mL / OUT: 500 mL / NET: 320 mL        PHYSICAL EXAM:  GENERAL: NAD, well-developed  HEAD:  Atraumatic, Normocephalic  EYES: EOMI, PERRLA, conjunctiva and sclera clear  NECK: Supple, No JVD  CHEST/LUNG: Clear to auscultation bilaterally; No wheeze  HEART: Regular rate and rhythm; No murmurs, rubs, or gallops  ABDOMEN: Soft, Nontender, Nondistended; Bowel sounds present  EXTREMITIES:  2+ Peripheral Pulses, No clubbing, cyanosis, or edema  PSYCH: AAOx3  NEUROLOGY: non-focal  SKIN: No rashes or lesions    LABS:                        9.3    5.18  )-----------( 183      ( 2017 05:50 )             28.2     11-13    136  |  102  |  17  ----------------------------<  120<H>  4.5   |  23  |  1.30    Ca    8.2<L>      2017 05:50  Phos  2.8       Mg     2.1         TPro  5.6<L>  /  Alb  2.9<L>  /  TBili  2.4<H>  /  DBili  x   /  AST  35  /  ALT  40  /  AlkPhos  335<H>            Urinalysis Basic - ( 2017 09:30 )    Color: YELLOW / Appearance: HAZY / S.018 / pH: 6.0  Gluc: 250 / Ketone: SMALL  / Bili: SMALL / Urobili: 4 E.U.   Blood: TRACE / Protein: 30 / Nitrite: NEGATIVE   Leuk Esterase: SMALL / RBC: 5-10 / WBC 5-10   Sq Epi: OCC / Non Sq Epi: x / Bacteria: FEW        RADIOLOGY & ADDITIONAL TESTS:    No new additional studies. Patient is a 72y old  Male who presents with a chief complaint of Came for complaints of Epigastric Pain radiating from the right side Abdomen to the left side, no nausea or vomiting (2017 09:51)      SUBJECTIVE / OVERNIGHT EVENTS: No complaints about epigastric pain this AM. States that he feels much improved since yesterday in terms of comfort and abdominal pain. Last BM was yesterday - loose and brown. No complaints of dizziness, light headedness, melena, hematochezia, hemoptysis, SOB, CP, f/c, n/v/d/c.    MEDICATIONS  (STANDING):  docusate sodium 100 milliGRAM(s) Oral three times a day  ferrous    sulfate 325 milliGRAM(s) Oral daily  finasteride 5 milliGRAM(s) Oral daily  piperacillin/tazobactam IVPB. 3.375 Gram(s) IV Intermittent every 8 hours  polyethylene glycol 3350 17 Gram(s) Oral two times a day  senna 2 Tablet(s) Oral at bedtime  tamsulosin 0.4 milliGRAM(s) Oral at bedtime    MEDICATIONS  (PRN):  acetaminophen   Tablet 650 milliGRAM(s) Oral every 6 hours PRN For Temp greater than 38 C (100.4 F)  acetaminophen   Tablet. 650 milliGRAM(s) Oral every 6 hours PRN Mild to moderate pain 1-5  HYDROmorphone   Tablet 6 milliGRAM(s) Oral every 6 hours PRN Severe Pain (6 - 10)  simethicone 80 milliGRAM(s) Chew every 6 hours PRN Dyspepsia        CAPILLARY BLOOD GLUCOSE        I&O's Summary    2017 07:01  -  2017 07:00  --------------------------------------------------------  IN: 820 mL / OUT: 500 mL / NET: 320 mL        PHYSICAL EXAM:  VS: T 97.8, HR 79, /64, RR 18, SpO2 97% on RA  GENERAL: NAD, well-groomed, well-developed  HEAD:  Atraumatic, Normocephalic  EYES: EOMI, PERRLA, conjunctiva and sclera clear  ENMT: No tonsillar erythema, exudates, or enlargement; Moist mucous membranes, Good dentition, No lesions  NECK: Supple, No JVD  NERVOUS SYSTEM:  Alert & Oriented X3, Good concentration; Motor Strength 5/5 B/L upper and lower extremities  CHEST/LUNG: Clear to auscultation bilaterally; No rales, rhonchi, wheezing, or rubs  HEART: Regular rate and rhythm; No murmurs, rubs, or gallops  ABDOMEN: Surgery site with dressing that is c/d/i - no discharge, tenderness or surrounding erythema. Bowel Sounds present, soft, abdomen nondistended, nontender in epigastric area (not at surgery site), No CVA tenderness or flank pain. Chevron scar well healed, nondistended, no guarding, no rebound  EXTREMITIES:  2+ Peripheral Pulses, No clubbing, cyanosis, or edema  LYMPH: No lymphadenopathy noted  SKIN: No rashes or lesions    LABS:                        9.3    5.18  )-----------( 183      ( 2017 05:50 )             28.2         136  |  102  |  17  ----------------------------<  120<H>  4.5   |  23  |  1.30    Ca    8.2<L>      2017 05:50  Phos  2.8       Mg     2.1         TPro  5.6<L>  /  Alb  2.9<L>  /  TBili  2.4<H>  /  DBili  x   /  AST  35  /  ALT  40  /  AlkPhos  335<H>            Urinalysis Basic - ( 2017 09:30 )    Color: YELLOW / Appearance: HAZY / S.018 / pH: 6.0  Gluc: 250 / Ketone: SMALL  / Bili: SMALL / Urobili: 4 E.U.   Blood: TRACE / Protein: 30 / Nitrite: NEGATIVE   Leuk Esterase: SMALL / RBC: 5-10 / WBC 5-10   Sq Epi: OCC / Non Sq Epi: x / Bacteria: FEW        RADIOLOGY & ADDITIONAL TESTS:    No new additional studies.

## 2017-11-13 NOTE — PROGRESS NOTE ADULT - PROBLEM SELECTOR PLAN 6
Patient with hydronephrosis of R kidney seen on CT A/P 2/2 right RP mass  - s/p ureteral stent placement, now resolved   - c/w tamsulosin & finasteride for likely BPH given enlarged prostate on CT A/P  - cont to monitor UOP  -pt will need outpatient Urology follow up for stent exchange ~ q 3 months

## 2017-11-13 NOTE — PROGRESS NOTE ADULT - PROBLEM SELECTOR PLAN 2
Patient with sudden Hgb drop from 11.6 on 11/11 to 9.6 yesterday and now 9.3 this AM. Patient no longer having melanotic stools and no complaints of dizziness or light headedness but has been hypotensive with SBP ~100 in s/o recent IR procedure through abdomen and persistent epigastric pain. Bili and alk phos also elevated to 2.4 and 335 respectively. GI aware.  -rectal exam showing no blood in vault this AM  -continue to assess patient at bedside for signs/symptoms of acute bleed  -monitor CBCs  -consider CT abdomen  -f/u GI recs

## 2017-11-13 NOTE — PROGRESS NOTE ADULT - PROBLEM SELECTOR PLAN 1
Patient febrile overnight. BCx from 11/10 growing E. coli; BCx from yesterday growing GNR. UCx growing GNR 10-49k. Currently no source but most likely 2/2 recent IR procedure as operation was done by going through bowel. qSOFA score 1 (SBP<100)  - c/w Zosyn for now; will f/u cx and monitor fever curves.  - Will consider repeat CT A/P to assess RP mass/ureteral stent and for intraabdominal pathology

## 2017-11-13 NOTE — PROGRESS NOTE ADULT - ATTENDING COMMENTS
Pt seen and examined, chart and labs reviewed.  Pt well known to me from 2 weeks ago.  Interval events included development of melena x2, now currently resolved with brown stool.  Pt also developed E.Coli bacteremia, possibly from translocation from gut bacteria during IR procedure.  Pt's blood cultures have not cleared despite being on IV Zosyn.  UCx now growing Klebsiella, largely pansensitive.      #E.Coli Bacteremia: likely a separate organism from what's growing in Ucx.  Will continue with IV Zosyn for now until sensitivities of Bcx return.  Given persistent fevers/rigors, will check CT abdomen for possible abscess formation.  Repeat Bcx sent this morning.  If any signs of acute decompensation or worsening fevers, would switch to IV meropenem.     #RP mass: s/p biopsy, awaiting pathology results.

## 2017-11-13 NOTE — PROGRESS NOTE ADULT - ASSESSMENT
72M with pancreatic adenocarcinoma (dx. 2015) s/p whipple s/p gemcitabine (x 6 cycles) & RT (completed 2016) , s/p Xeloda presents for 2-week history of abdominal pain found to have right RP mass on CT likely recurrence of pancreatic CA vs. new malignancy c/b R. ureteral compression , hydronephrosis  sepsis 2/2 pyelo requiring urgent ureteral stent placement by urology for decompression. S/p RP mass biopsy on 11/9 c/b melena that resolved yesterday but now with d/t H/H, course c/b sepsis 2/2 e. coli bacteremia.

## 2017-11-13 NOTE — PROGRESS NOTE ADULT - ATTENDING COMMENTS
Pt seen and examined. Agree with fellow's note. Plan discussed with patient and primary team.     Patient had chief complaint of possible gi bleed, not substantiated     Hepatology consult if needed    Outpatient gi followup [phone] in 1 month    will sign off. Pt seen and examined. Agree with fellow's note. Plan discussed with patient and primary team and daughter    Patient had chief complaint of possible gi bleed, not substantiated     Brown stool on our rectal exam today. No melena/hematochezia. No overt GI bleed.     Hepatology consult if needed    Outpatient gi followup [phone] in 1 month    will sign off.

## 2017-11-14 LAB
ALBUMIN SERPL ELPH-MCNC: 2.9 G/DL — LOW (ref 3.3–5)
ALP SERPL-CCNC: 334 U/L — HIGH (ref 40–120)
ALT FLD-CCNC: 33 U/L — SIGNIFICANT CHANGE UP (ref 4–41)
AST SERPL-CCNC: 29 U/L — SIGNIFICANT CHANGE UP (ref 4–40)
BASOPHILS # BLD AUTO: 0.01 K/UL — SIGNIFICANT CHANGE UP (ref 0–0.2)
BASOPHILS NFR BLD AUTO: 0.2 % — SIGNIFICANT CHANGE UP (ref 0–2)
BILIRUB SERPL-MCNC: 2.5 MG/DL — HIGH (ref 0.2–1.2)
BUN SERPL-MCNC: 14 MG/DL — SIGNIFICANT CHANGE UP (ref 7–23)
CALCIUM SERPL-MCNC: 8.6 MG/DL — SIGNIFICANT CHANGE UP (ref 8.4–10.5)
CHLORIDE SERPL-SCNC: 101 MMOL/L — SIGNIFICANT CHANGE UP (ref 98–107)
CO2 SERPL-SCNC: 21 MMOL/L — LOW (ref 22–31)
CREAT SERPL-MCNC: 1.13 MG/DL — SIGNIFICANT CHANGE UP (ref 0.5–1.3)
EOSINOPHIL # BLD AUTO: 0.1 K/UL — SIGNIFICANT CHANGE UP (ref 0–0.5)
EOSINOPHIL NFR BLD AUTO: 1.9 % — SIGNIFICANT CHANGE UP (ref 0–6)
GLUCOSE SERPL-MCNC: 121 MG/DL — HIGH (ref 70–99)
HCT VFR BLD CALC: 30.1 % — LOW (ref 39–50)
HGB BLD-MCNC: 9.9 G/DL — LOW (ref 13–17)
IMM GRANULOCYTES # BLD AUTO: 0.03 # — SIGNIFICANT CHANGE UP
IMM GRANULOCYTES NFR BLD AUTO: 0.6 % — SIGNIFICANT CHANGE UP (ref 0–1.5)
LYMPHOCYTES # BLD AUTO: 0.48 K/UL — LOW (ref 1–3.3)
LYMPHOCYTES # BLD AUTO: 9.2 % — LOW (ref 13–44)
MAGNESIUM SERPL-MCNC: 1.9 MG/DL — SIGNIFICANT CHANGE UP (ref 1.6–2.6)
MCHC RBC-ENTMCNC: 29.8 PG — SIGNIFICANT CHANGE UP (ref 27–34)
MCHC RBC-ENTMCNC: 32.9 % — SIGNIFICANT CHANGE UP (ref 32–36)
MCV RBC AUTO: 90.7 FL — SIGNIFICANT CHANGE UP (ref 80–100)
MONOCYTES # BLD AUTO: 0.36 K/UL — SIGNIFICANT CHANGE UP (ref 0–0.9)
MONOCYTES NFR BLD AUTO: 6.9 % — SIGNIFICANT CHANGE UP (ref 2–14)
NEUTROPHILS # BLD AUTO: 4.25 K/UL — SIGNIFICANT CHANGE UP (ref 1.8–7.4)
NEUTROPHILS NFR BLD AUTO: 81.2 % — HIGH (ref 43–77)
NRBC # FLD: 0 — SIGNIFICANT CHANGE UP
PHOSPHATE SERPL-MCNC: 2.5 MG/DL — SIGNIFICANT CHANGE UP (ref 2.5–4.5)
PLATELET # BLD AUTO: 199 K/UL — SIGNIFICANT CHANGE UP (ref 150–400)
PMV BLD: 10.5 FL — SIGNIFICANT CHANGE UP (ref 7–13)
POTASSIUM SERPL-MCNC: 3.8 MMOL/L — SIGNIFICANT CHANGE UP (ref 3.5–5.3)
POTASSIUM SERPL-SCNC: 3.8 MMOL/L — SIGNIFICANT CHANGE UP (ref 3.5–5.3)
PROT SERPL-MCNC: 5.9 G/DL — LOW (ref 6–8.3)
RBC # BLD: 3.32 M/UL — LOW (ref 4.2–5.8)
RBC # FLD: 14.1 % — SIGNIFICANT CHANGE UP (ref 10.3–14.5)
SODIUM SERPL-SCNC: 137 MMOL/L — SIGNIFICANT CHANGE UP (ref 135–145)
WBC # BLD: 5.23 K/UL — SIGNIFICANT CHANGE UP (ref 3.8–10.5)
WBC # FLD AUTO: 5.23 K/UL — SIGNIFICANT CHANGE UP (ref 3.8–10.5)

## 2017-11-14 PROCEDURE — 99233 SBSQ HOSP IP/OBS HIGH 50: CPT | Mod: GC

## 2017-11-14 PROCEDURE — 93970 EXTREMITY STUDY: CPT | Mod: 26

## 2017-11-14 PROCEDURE — 99232 SBSQ HOSP IP/OBS MODERATE 35: CPT | Mod: GC

## 2017-11-14 PROCEDURE — 99222 1ST HOSP IP/OBS MODERATE 55: CPT | Mod: GC

## 2017-11-14 RX ORDER — PIPERACILLIN AND TAZOBACTAM 4; .5 G/20ML; G/20ML
3.38 INJECTION, POWDER, LYOPHILIZED, FOR SOLUTION INTRAVENOUS ONCE
Qty: 0 | Refills: 0 | Status: COMPLETED | OUTPATIENT
Start: 2017-11-14 | End: 2017-11-14

## 2017-11-14 RX ORDER — HEPARIN SODIUM 5000 [USP'U]/ML
5000 INJECTION INTRAVENOUS; SUBCUTANEOUS EVERY 8 HOURS
Qty: 0 | Refills: 0 | Status: DISCONTINUED | OUTPATIENT
Start: 2017-11-14 | End: 2017-11-14

## 2017-11-14 RX ORDER — PIPERACILLIN AND TAZOBACTAM 4; .5 G/20ML; G/20ML
3.38 INJECTION, POWDER, LYOPHILIZED, FOR SOLUTION INTRAVENOUS EVERY 8 HOURS
Qty: 0 | Refills: 0 | Status: DISCONTINUED | OUTPATIENT
Start: 2017-11-14 | End: 2017-11-14

## 2017-11-14 RX ORDER — SODIUM CHLORIDE 9 MG/ML
1000 INJECTION INTRAMUSCULAR; INTRAVENOUS; SUBCUTANEOUS
Qty: 0 | Refills: 0 | Status: DISCONTINUED | OUTPATIENT
Start: 2017-11-14 | End: 2017-11-17

## 2017-11-14 RX ORDER — ENOXAPARIN SODIUM 100 MG/ML
65 INJECTION SUBCUTANEOUS
Qty: 0 | Refills: 0 | Status: DISCONTINUED | OUTPATIENT
Start: 2017-11-14 | End: 2017-11-15

## 2017-11-14 RX ORDER — MEROPENEM 1 G/30ML
1000 INJECTION INTRAVENOUS EVERY 8 HOURS
Qty: 0 | Refills: 0 | Status: DISCONTINUED | OUTPATIENT
Start: 2017-11-14 | End: 2017-11-17

## 2017-11-14 RX ADMIN — TAMSULOSIN HYDROCHLORIDE 0.4 MILLIGRAM(S): 0.4 CAPSULE ORAL at 22:25

## 2017-11-14 RX ADMIN — HYDROMORPHONE HYDROCHLORIDE 1 MILLIGRAM(S): 2 INJECTION INTRAMUSCULAR; INTRAVENOUS; SUBCUTANEOUS at 20:45

## 2017-11-14 RX ADMIN — ENOXAPARIN SODIUM 65 MILLIGRAM(S): 100 INJECTION SUBCUTANEOUS at 22:58

## 2017-11-14 RX ADMIN — Medication 325 MILLIGRAM(S): at 11:50

## 2017-11-14 RX ADMIN — PIPERACILLIN AND TAZOBACTAM 200 GRAM(S): 4; .5 INJECTION, POWDER, LYOPHILIZED, FOR SOLUTION INTRAVENOUS at 08:30

## 2017-11-14 RX ADMIN — HYDROMORPHONE HYDROCHLORIDE 1 MILLIGRAM(S): 2 INJECTION INTRAMUSCULAR; INTRAVENOUS; SUBCUTANEOUS at 05:51

## 2017-11-14 RX ADMIN — HYDROMORPHONE HYDROCHLORIDE 1 MILLIGRAM(S): 2 INJECTION INTRAMUSCULAR; INTRAVENOUS; SUBCUTANEOUS at 06:15

## 2017-11-14 RX ADMIN — Medication 650 MILLIGRAM(S): at 06:53

## 2017-11-14 RX ADMIN — MEROPENEM 100 MILLIGRAM(S): 1 INJECTION INTRAVENOUS at 22:25

## 2017-11-14 RX ADMIN — HEPARIN SODIUM 5000 UNIT(S): 5000 INJECTION INTRAVENOUS; SUBCUTANEOUS at 13:08

## 2017-11-14 RX ADMIN — MEROPENEM 100 MILLIGRAM(S): 1 INJECTION INTRAVENOUS at 13:08

## 2017-11-14 RX ADMIN — SODIUM CHLORIDE 125 MILLILITER(S): 9 INJECTION INTRAMUSCULAR; INTRAVENOUS; SUBCUTANEOUS at 05:55

## 2017-11-14 RX ADMIN — Medication 650 MILLIGRAM(S): at 07:30

## 2017-11-14 RX ADMIN — FINASTERIDE 5 MILLIGRAM(S): 5 TABLET, FILM COATED ORAL at 13:08

## 2017-11-14 RX ADMIN — HYDROMORPHONE HYDROCHLORIDE 1 MILLIGRAM(S): 2 INJECTION INTRAMUSCULAR; INTRAVENOUS; SUBCUTANEOUS at 21:15

## 2017-11-14 NOTE — CONSULT NOTE ADULT - ATTENDING COMMENTS
72 M pmhx of pancreatic adenocarcinoma( 2015) s/p whipple/ s/p gemcitabine X 6 cycles, and 1 month of radiation( last tx with gemcitabine in 2016) and xeloda p/w abdominal pain x 2 weeks. Patient found to have E. coli bacteremia, thought transient 2/2 to IR biopsy. Note that patient had EUS on 11/7 to evaluate retroperitoneal mass but could not bx. Note elevated bilirubin on LFTs, ? due to procedure vs obstruction. Bacteremia due to cholangitis vs due to biopsy vs occult; doubt UTI. Bacterial cultures found S to CTX.  - Continue Meropenem 1g q 8 for now  - If continue to improve, afebrile, will further narrow  - GI comment on rise in bilirubin in setting of persistent fevers  - Trend LFTs  - Follow up repeat BCXs    Jens Livingston MD  Pager [phone]  After 5pm and on weekends call [phone] 72 M pmhx of pancreatic adenocarcinoma( 2015) s/p whipple/ s/p gemcitabine X 6 cycles, and 1 month of radiation( last tx with gemcitabine in 2016) and xeloda p/w abdominal pain x 2 weeks. Patient found to have E. coli bacteremia, thought transient 2/2 to IR biopsy. Note that patient had EUS on 11/7 to evaluate retroperitoneal mass but could not bx. Note elevated bilirubin on LFTs, ? due to procedure vs obstruction. Bacteremia due to cholangitis vs due to biopsy vs septic thrombus vs occult; doubt UTI. Bacterial cultures found S to CTX.  - Continue Meropenem 1g q 8 for now  - If continue to improve, afebrile, will further narrow  - GI comment on rise in bilirubin in setting of persistent fevers  - Trend LFTs  - Follow up repeat BCXs    Jens Livingston MD  Pager [phone]  After 5pm and on weekends call [phone]

## 2017-11-14 NOTE — PROGRESS NOTE ADULT - PROBLEM SELECTOR PLAN 2
Patient with sudden Hgb drop from 11.6 on 11/11 to 9.6 yesterday and now 9.3 this AM. Patient no longer having melanotic stools and no complaints of dizziness or light headedness but has been hypotensive with SBP ~100 in s/o recent IR procedure through abdomen and persistent epigastric pain. Bili and alk phos also elevated to 2.4 and 335 respectively. GI aware.  -rectal exam showing no blood in vault this AM  -continue to assess patient at bedside for signs/symptoms of acute bleed  -monitor CBCs  -consider CT abdomen  -f/u GI recs Patient with sudden Hgb drop from 11.6 on 11/11 to 9.3 yesterday but now 9.9 this AM. Patient no longer having melanotic stools and no complaints of dizziness or light headedness, normotensive but in s/o recent IR procedure through abdomen and persistent epigastric pain. Bili and alk phos also elevated but stable at 2.5 and 334 respectively. GI aware.  -rectal exam showing no blood in vault yesterday  -continue to assess patient at bedside for signs/symptoms of acute bleed  -monitor CBCs  -f/u GI recs Patient with sudden Hgb drop from 11.6 on 11/11 to 9.3 yesterday but now 9.9 this AM. Patient no longer having melanotic stools and no complaints of dizziness or light headedness, normotensive but in s/o recent IR procedure through abdomen and persistent epigastric pain. Bili and alk phos also elevated but stable at 2.5 and 334 respectively. GI aware.  -rectal exam showing no blood in vault yesterday  -continue to assess patient at bedside for signs/symptoms of acute bleed  -monitor CBCs

## 2017-11-14 NOTE — CONSULT NOTE ADULT - ASSESSMENT
72 year old male s/p whipple and associated retroperitoneum mass, currently E.coli bacteremia  Assessment:   - WBC 5.36, Left shift   - GNR growth in BCxx2 on 11/12 and 11/13   - Klebsiella growth in Urine Cx   - E.coli growth in BCxx2 on 11/10     Plan:   - Pt was examined and evaluated 72 year old male s/p whipple and associated retroperitoneum mass, currently E.coli bacteremia  Assessment:   - WBC 5.36   - GNR growth in BCxx2 on 11/12 and 11/13   - Klebsiella growth in Urine Cx   - E.coli growth in BCxx2 on 11/10   - Febrile to 101.8 11/13 PM; currently afebrile    Plan:   - Pt was examined and evaluated   - Continue Meropenem ABx regiment. Will consider 10-14 days additional Abx treatment past clean BCxx2  - Will continue to follow cultures and data. May switch to Ceftriaxone based on presenting data.    - Will continue to follow on floors  - Thank you for the consult

## 2017-11-14 NOTE — PROGRESS NOTE ADULT - PROBLEM SELECTOR PLAN 4
Right RP mass with multiple arterially enhancing lesions at the hepatic dome found on CT A/P concerning for recurrence of pancreatic CA vs. new malignancy?   - CEA noted to be elevated to 6.3 in June  - will f/u tissue pathology per Heme/Onc recommendations  - RP mass biopsied by IR 11/9- results of pathology pending, c/b melena x2 on 11/10 with stable H/H.  - GI consulted for melena (now resolved), suspect transient bleed from biopsies, no indication for EGD at this time  - Regular diet for now Right RP mass with multiple arterially enhancing lesions at the hepatic dome found on CT A/P concerning for recurrence of pancreatic CA vs. new malignancy  - CEA noted to be elevated to 6.3 in June  - will f/u tissue pathology per Heme/Onc recommendations  - RP mass biopsied by IR 11/9- results of pathology pending

## 2017-11-14 NOTE — PROGRESS NOTE ADULT - PROBLEM SELECTOR PLAN 3
Slight d/t of crt to 1.30. Suspect prerenal in setting of sepsis   - c/w IVF at 100cc/hr Thought to be most likely 2/2 to prerenal  -resolved today after IVF yesterday  -cont to monitor Thought to be most likely 2/2 to prerenal  -resolved   -cont to monitor

## 2017-11-14 NOTE — PROGRESS NOTE ADULT - SUBJECTIVE AND OBJECTIVE BOX
Patient is a 72y old  Male who presents with a chief complaint of Came for complaints of Epigastric Pain radiating from the right side Abdomen to the left side, no nausea or vomiting (05 Nov 2017 09:51)      SUBJECTIVE / OVERNIGHT EVENTS: Patient experiencing rigors, fever to 101.8 and tachy to 122 last night but has since resolved after tylenol. Patient complaining of mild abdominal pain this AM.     MEDICATIONS  (STANDING):  docusate sodium 100 milliGRAM(s) Oral three times a day  ferrous    sulfate 325 milliGRAM(s) Oral daily  finasteride 5 milliGRAM(s) Oral daily  polyethylene glycol 3350 17 Gram(s) Oral two times a day  senna 2 Tablet(s) Oral at bedtime  sodium chloride 0.9%. 1000 milliLiter(s) (125 mL/Hr) IV Continuous <Continuous>  tamsulosin 0.4 milliGRAM(s) Oral at bedtime    MEDICATIONS  (PRN):  acetaminophen   Tablet 650 milliGRAM(s) Oral every 6 hours PRN For Temp greater than 38 C (100.4 F)  acetaminophen   Tablet. 650 milliGRAM(s) Oral every 6 hours PRN Mild to moderate pain 1-5  HYDROmorphone  Injectable 1 milliGRAM(s) IV Push every 6 hours PRN Moderate to severe pain 6-10  simethicone 80 milliGRAM(s) Chew every 6 hours PRN Dyspepsia        CAPILLARY BLOOD GLUCOSE        I&O's Summary    13 Nov 2017 07:01  -  14 Nov 2017 07:00  --------------------------------------------------------  IN: 0 mL / OUT: 600 mL / NET: -600 mL        PHYSICAL EXAM:  VS: T 99.5, HR 82, /93, RR 18, SpO2 100% on RA  GENERAL: mild distress from abdominal pain, sweating  HEAD:  Atraumatic, Normocephalic  EYES: EOMI, PERRLA, conjunctiva and sclera clear  ENMT: No tonsillar erythema, exudates, or enlargement; Moist mucous membranes, Good dentition, No lesions  NECK: Supple, No JVD  NERVOUS SYSTEM:  Alert & Oriented X3, Good concentration; Motor Strength 5/5 B/L upper and lower extremities  CHEST/LUNG: Clear to auscultation bilaterally; No rales, rhonchi, wheezing, or rubs  HEART: Regular rate and rhythm; No murmurs, rubs, or gallops  ABDOMEN: Surgery site with dressing that is c/d/i - no discharge, mild tenderness but no surrounding erythema. Bowel Sounds present, soft, abdomen nondistended, nontender in epigastric area (not at surgery site), No CVA tenderness or flank pain. Chevron scar well healed, nondistended, no guarding, no rebound  EXTREMITIES:  2+ Peripheral Pulses, No clubbing, cyanosis, or edema  LYMPH: No lymphadenopathy noted  SKIN: No rashes or lesions    LABS:                        9.9    5.23  )-----------( 199      ( 14 Nov 2017 06:15 )             30.1     11-14    137  |  101  |  14  ----------------------------<  121<H>  3.8   |  21<L>  |  1.13    Ca    8.6      14 Nov 2017 06:15  Phos  2.5     11-14  Mg     1.9     11-14    TPro  5.9<L>  /  Alb  2.9<L>  /  TBili  2.5<H>  /  DBili  x   /  AST  29  /  ALT  33  /  AlkPhos  334<H>  11-14              RADIOLOGY & ADDITIONAL TESTS:    CT A/P 11/13:  Preliminary read:  Improved right hydronephrosis status post placement of nephroureteral shunt. Otherwise no significant interval change from 11/02/2017. Redemonstration of retroperitoneal soft tissue recurrence. Enlarged heterogeneous appearance of the prostate is unchanged. Again noted the IVC appears markedly narrowed just inferior to the renal veins and extrinsic compression and/or invasion by tumor is not excluded. Thrombosis of the infrarenal IVC is not excluded. There is subtle distention of the central veins in the pelvis and in the deep veins of the proximal thighs. Lower extremity duplex ultrasound can be performed as clinically warranted to assess for cardiorespiratory phasicity. Patient is a 72y old  Male who presents with a chief complaint of Came for complaints of Epigastric Pain radiating from the right side Abdomen to the left side, no nausea or vomiting (05 Nov 2017 09:51)      SUBJECTIVE / OVERNIGHT EVENTS: Patient experiencing rigors, fever to 101.8 and tachy to 122 last night but has since resolved after tylenol. Patient complaining of mild abdominal pain this AM. BM this AM was loose and brown - no longer diarrhea. Denies CP, SOB, n/v/d/c.    MEDICATIONS  (STANDING):  docusate sodium 100 milliGRAM(s) Oral three times a day  ferrous    sulfate 325 milliGRAM(s) Oral daily  finasteride 5 milliGRAM(s) Oral daily  polyethylene glycol 3350 17 Gram(s) Oral two times a day  senna 2 Tablet(s) Oral at bedtime  sodium chloride 0.9%. 1000 milliLiter(s) (125 mL/Hr) IV Continuous <Continuous>  tamsulosin 0.4 milliGRAM(s) Oral at bedtime    MEDICATIONS  (PRN):  acetaminophen   Tablet 650 milliGRAM(s) Oral every 6 hours PRN For Temp greater than 38 C (100.4 F)  acetaminophen   Tablet. 650 milliGRAM(s) Oral every 6 hours PRN Mild to moderate pain 1-5  HYDROmorphone  Injectable 1 milliGRAM(s) IV Push every 6 hours PRN Moderate to severe pain 6-10  simethicone 80 milliGRAM(s) Chew every 6 hours PRN Dyspepsia        CAPILLARY BLOOD GLUCOSE        I&O's Summary    13 Nov 2017 07:01  -  14 Nov 2017 07:00  --------------------------------------------------------  IN: 0 mL / OUT: 600 mL / NET: -600 mL        PHYSICAL EXAM:  VS: T 99.5, HR 82, /93, RR 18, SpO2 100% on RA  GENERAL: mild distress from abdominal pain, sweating  HEAD:  Atraumatic, Normocephalic  EYES: EOMI, PERRLA, conjunctiva and sclera clear  ENMT: No tonsillar erythema, exudates, or enlargement; Moist mucous membranes, Good dentition, No lesions  NECK: Supple, No JVD  NERVOUS SYSTEM:  Alert & Oriented X3, Good concentration; Motor Strength 5/5 B/L upper and lower extremities  CHEST/LUNG: Clear to auscultation bilaterally; No rales, rhonchi, wheezing, or rubs  HEART: Regular rate and rhythm; No murmurs, rubs, or gallops  ABDOMEN: Surgery site with dressing that is c/d/i - no discharge, mild tenderness but no surrounding erythema. Bowel Sounds present, soft, abdomen nondistended, nontender in epigastric area (not at surgery site), No CVA tenderness or flank pain. Chevron scar well healed, nondistended, no guarding, no rebound  EXTREMITIES:  2+ Peripheral Pulses, No clubbing, cyanosis, or edema  LYMPH: No lymphadenopathy noted  SKIN: No rashes or lesions    LABS:                        9.9    5.23  )-----------( 199      ( 14 Nov 2017 06:15 )             30.1     11-14    137  |  101  |  14  ----------------------------<  121<H>  3.8   |  21<L>  |  1.13    Ca    8.6      14 Nov 2017 06:15  Phos  2.5     11-14  Mg     1.9     11-14    TPro  5.9<L>  /  Alb  2.9<L>  /  TBili  2.5<H>  /  DBili  x   /  AST  29  /  ALT  33  /  AlkPhos  334<H>  11-14              RADIOLOGY & ADDITIONAL TESTS:    CT A/P 11/13:  Preliminary read:  Improved right hydronephrosis status post placement of nephroureteral shunt. Otherwise no significant interval change from 11/02/2017. Redemonstration of retroperitoneal soft tissue recurrence. Enlarged heterogeneous appearance of the prostate is unchanged. Again noted the IVC appears markedly narrowed just inferior to the renal veins and extrinsic compression and/or invasion by tumor is not excluded. Thrombosis of the infrarenal IVC is not excluded. There is subtle distention of the central veins in the pelvis and in the deep veins of the proximal thighs. Lower extremity duplex ultrasound can be performed as clinically warranted to assess for cardiorespiratory phasicity.

## 2017-11-14 NOTE — PROGRESS NOTE ADULT - ATTENDING COMMENTS
Pt seen and examined, chart and labs reviewed.  Pt with persistent fevers despite being on IV Zosyn, then IV Ceftriaxone (based on Bcx sensitivities).  Pt was switched to IV meropenem for GNR bacteremia, sources including an ascending cholangitis vs. seeding s/p IR biopsy.  RP mass biopsy results are still pending, will contact pathology lab for preliminary results. Pt seen and examined, chart and labs reviewed.  Pt with persistent fevers despite being on IV Zosyn, then IV Ceftriaxone (based on Bcx sensitivities).  Pt was switched to IV meropenem for GNR bacteremia, sources including an ascending cholangitis vs. seeding s/p IR biopsy.  RP mass biopsy results are still pending, will contact pathology lab for preliminary results.  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

## 2017-11-14 NOTE — CONSULT NOTE ADULT - SUBJECTIVE AND OBJECTIVE BOX
HPI:  72M pmhx of pancreatic adenocarcinoma( 2015) s/p whipple/ s/p gemcitabine X 6 cycles, and 1 month of radiation( last tx with gemcitabine in 2016) and xeloda p/w abdominal pain x 2 weeks. Patient is Ethiopian only speaking,  services offered and declined, daughter at bedside provides translation. Patient states the abdominal pain is cramping in nature, begins in the epigastrium migrates to RUQ, starts at 1-2 worsens to 6/10 with regards to pain. Patient denies any diarrhea, states he is constipatied last 3 days, states when he goes its small round balls. States he had one episode of fever last week (38 celsius) x once. Denies vomiting, diarrhea, chest pain, dyspnea, melena, hematochezia. Recent travel to the [place]. No sick contacts    VS in the ED: T: 97.8, HR:61-65, BP: 145//90, RR: 14 100% on RA    Labs personally reviewed:  CBC significant for normocytic anemia Hgb of 11.8 ( baseline 12). CMP significant for an HILTON Cr of 1.66 ( baseline Cr close to 1), Alk phos of 243. Bicarb 29, AG 6. VBG w/o lactate, Ph of 7.36 with pCO2 of 56     CT A/P: Status post Whipple procedure with no significant change in appearance of the pancreas.  Interval progression of soft tissue recurrence in the retroperitoneum with progression in moderate right hydronephrosis.  Suggestion of multiple arterially enhancing lesions at the hepatic dome.  There is progression of moderate right hydronephrosis secondary to the retroperitoneal mass,   located between the duodenum and IVC, which has progressed since the PET/CT ( Aug 2017), now measuring 2.1 x 2 cm  A 3.5 x 3.3 cm infrarenal aortic aneurysm (02 Nov 2017 17:06)    ID consult: 72 year old male with pacreatic adencarcinoma. ID consulted for E. Coli bacteremia. Patient denies any abdominal pain at this time. Pt does admit to waxing and waning fevers, at AM and night time only. (+) Hydronephrosis w/ associated retroperitoneal mass located between the duodenum and IVC, which has progressed since the PET/CT ( Aug 2017), now measuring 2.1 x 2 cm  A 3.5 x 3.3 cm.    PAST MEDICAL & SURGICAL HISTORY:  Adenocarcinoma: Periampulla   Hepatobiliary type  Pancreatic carcinoma: s/p whipple in 2015  Jejunostomy tube in situ    Allergies  No Known Allergies  ANTIMICROBIALS:  meropenem IVPB 1000 every 8 hours    OTHER MEDS: MEDICATIONS  (STANDING):  acetaminophen   Tablet 650 every 6 hours PRN  acetaminophen   Tablet. 650 every 6 hours PRN  docusate sodium 100 three times a day  finasteride 5 daily  HYDROmorphone  Injectable 1 every 6 hours PRN  polyethylene glycol 3350 17 two times a day  senna 2 at bedtime  simethicone 80 every 6 hours PRN  tamsulosin 0.4 at bedtime    SOCIAL HISTORY:  [ ] etoh [ ] tobacco [ ] former smoker [ ] IVDU    FAMILY HISTORY:  Family history of heart disease (Father)    REVIEW OF SYSTEMS  [  ] ROS unobtainable because:    [  ] All other systems negative except as noted below:	    Constitutional:  [X] fever [X] weight loss  Skin:  [ ] rash [ ] phlebitis	  Eyes: [ ] icterus [ ] inflammation	  ENMT: [ ] discharge [ ] thrush [ ] ulcers [ ] exudates  Respiratory: [ ] dyspnea [ ] hemoptysis [ ] cough [ ] sputum	  Cardiovascular:  [ ] chest pain [ ] palpitations [ ] edema	  Gastrointestinal:  [ ] nausea [ ] vomiting [ ] diarrhea [ ] constipation [ ] pain	  Genitourinary:  [ ] dysuria [ ] frequency [ ] hematuria [ ] discharge [ ] flank pain  Musculoskeletal:  [ ] myalgias [ ] arthralgias [ ] arthritis	  Neurological:  [ ] headache [ ] seizures	  Psychiatric:  [ ] anxiety [ ] depression	  Hematology/Lymphatics:  [ ] lymphadenopathy  Endocrine:  [ ] adrenal [ ] thyroid  Allergic/Immunologic:	 [ ] transplant [ ] seasonal    Vital Signs Last 24 Hrs  T(F): 98.9 (11-14-17 @ 10:13), Max: 102.2 (11-10-17 @ 22:56)    Vital Signs Last 24 Hrs  HR: 102 (11-14-17 @ 10:13) (73 - 122)  BP: 101/73 (11-14-17 @ 10:13) (101/73 - 137/93)  RR: 18 (11-14-17 @ 10:13)  SpO2: 99% (11-14-17 @ 10:13) (96% - 100%)  Wt(kg): --    PHYSICAL EXAM:  General: non-toxic  HEAD/EYES: anicteric, PERRL  ENT:  supple  Cardiovascular:   S1, S2  Respiratory:  clear bilaterally  GI:  soft, non-tender, normal bowel sounds  :  no CVA tenderness   Musculoskeletal:  no synovitis  Neurologic:  grossly non-focal  Skin:  no rash  Lymph: no lymphadenopathy  Psychiatric:  appropriate affect  Vascular:  no phlebitis                        9.9    5.23  )-----------( 199      ( 14 Nov 2017 06:15 )             30.1   11-14    137  |  101  |  14  ----------------------------<  121<H>  3.8   |  21<L>  |  1.13    Ca    8.6      14 Nov 2017 06:15  Phos  2.5     11-14  Mg     1.9     11-14    TPro  5.9<L>  /  Alb  2.9<L>  /  TBili  2.5<H>  /  DBili  x   /  AST  29  /  ALT  33  /  AlkPhos  334<H>  11-14    MICROBIOLOGY:  BLOOD PERIPHERAL  11-13-17 --  --  --    BLOOD PERIPHERAL  11-12-17 --  --  --    URINE MIDSTREAM  11-11-17 --  --  Klebsiella pneumoniae    BLOOD  11-10-17 --  --  BLOOD CULTURE PCR  Escherichia coli    URINE MIDSTREAM  11-02-17 --  --  --      BLOOD PERIPHERAL  11-02-17 --  --  --    RADIOLOGY:    < from: CT Abdomen and Pelvis w/ Oral Cont and w/ IV Cont (11.02.17 @ 13:10) >  EXAM:  CT ABDOMEN AND PELVIS OC IC        PROCEDURE DATE:  Nov 2 2017         INTERPRETATION:  CLINICAL INFORMATION: Abdominal pain. Periampullary   cancer, recurrent. Status post Whipple procedure.    COMPARISON: PET/CT 8/7/2017, MRI 7/21/2017.    PROCEDURE:   CT of the Abdomen and Pelvis was performed with intravenous contrast.   Intravenous contrast: 90 ml Omnipaque 350. 10 ml discarded.  Oral contrast: positive contrast was administered.  Sagittal and coronal reformats were performed.    FINDINGS:    LOWER CHEST: The visualized lung bases are clear.        LIVER: There is suggestion of multiple arterially enhancing lesions at   the dome. Scattered cysts.  BILE DUCTS: Normal caliber.  GALLBLADDER: Surgically absent.  SPLEEN: Unremarkable.  PANCREAS: Post Whipple changes. Pancreas is otherwise unchanged in   appearance since prior MRI.  ADRENALS: Unremarkable.  KIDNEYS/URETERS: Multiple bilateral cysts, including a dominant   hemorrhagic cyst in the upper pole of left kidney, better characterized   on prior MRI. No hydronephrosis on the left. There is progression of   moderate right hydronephrosis secondary to the retroperitoneal mass,   located between the duodenum and IVC, which has progressed since the   PET/CT, now measuring 2.1 x 2 cm.      BLADDER: Within normal limits.  REPRODUCTIVE ORGANS: The prostate is enlarged.    BOWEL: Normal in course and caliber without obstruction. Appendix is   normal. Moderate amount of stool. A gastrojejunostomy is again noted.  PERITONEUM/RETROPERITONEUM: No pneumoperitoneum, ascites.  VESSELS: A 3.5 x 3.3 cm infrarenal aortic aneurysm. Mild vascular   calcifications.  BONES/SOFT TISSUES: Degenerative changes of the visualized axial spine.    IMPRESSION:  Status post Whipple procedurewith no significant change in appearance of   the pancreas.  Interval progression of soft tissue recurrence in the retroperitoneum   with progression in moderate right hydronephrosis.  Suggestion of multiple arterially enhancing lesions at the hepatic dome.   This can be better evaluated with MRI on outpatient basis.                            ARLIN MELO M.D. ATTENDING RADIOLOGIST  This document has been electronically signed. Nov 2 2017  2:14PM    < end of copied text > HPI:  72M pmhx of pancreatic adenocarcinoma( 2015) s/p whipple/ s/p gemcitabine X 6 cycles, and 1 month of radiation( last tx with gemcitabine in 2016) and xeloda p/w abdominal pain x 2 weeks. Patient is Malian only speaking,  services offered and declined, daughter at bedside provides translation. Patient states the abdominal pain is cramping in nature, begins in the epigastrium migrates to RUQ, starts at 1-2 worsens to 6/10 with regards to pain. Patient denies any diarrhea, states he is constipatied last 3 days, states when he goes its small round balls. States he had one episode of fever last week (38 celsius) x once. Denies vomiting, diarrhea, chest pain, dyspnea, melena, hematochezia. Recent travel to the [place]. No sick contacts    VS in the ED: T: 97.8, HR:61-65, BP: 145//90, RR: 14 100% on RA    Labs personally reviewed:  CBC significant for normocytic anemia Hgb of 11.8 ( baseline 12). CMP significant for an HILTON Cr of 1.66 ( baseline Cr close to 1), Alk phos of 243. Bicarb 29, AG 6. VBG w/o lactate, Ph of 7.36 with pCO2 of 56     CT A/P: Status post Whipple procedure with no significant change in appearance of the pancreas.  Interval progression of soft tissue recurrence in the retroperitoneum with progression in moderate right hydronephrosis.  Suggestion of multiple arterially enhancing lesions at the hepatic dome.  There is progression of moderate right hydronephrosis secondary to the retroperitoneal mass,   located between the duodenum and IVC, which has progressed since the PET/CT ( Aug 2017), now measuring 2.1 x 2 cm  A 3.5 x 3.3 cm infrarenal aortic aneurysm (02 Nov 2017 17:06)    ID consult: 72 year old male with pacreatic adencarcinoma. ID consulted for E. Coli bacteremia. Patient denies any abdominal pain at this time. Pt does admit to waxing and waning fevers, at AM and night time only. (+) Hydronephrosis w/ associated retroperitoneal mass located between the duodenum and IVC, which has progressed since the PET/CT ( Aug 2017), now measuring 2.1 x 2 cm  A 3.5 x 3.3 cm.    PAST MEDICAL & SURGICAL HISTORY:  Adenocarcinoma: Periampulla   Hepatobiliary type  Pancreatic carcinoma: s/p whipple in 2015  Jejunostomy tube in situ    Allergies  No Known Allergies  ANTIMICROBIALS:  meropenem IVPB 1000 every 8 hours    OTHER MEDS: MEDICATIONS  (STANDING):  acetaminophen   Tablet 650 every 6 hours PRN  acetaminophen   Tablet. 650 every 6 hours PRN  docusate sodium 100 three times a day  finasteride 5 daily  HYDROmorphone  Injectable 1 every 6 hours PRN  polyethylene glycol 3350 17 two times a day  senna 2 at bedtime  simethicone 80 every 6 hours PRN  tamsulosin 0.4 at bedtime    SOCIAL HISTORY:  [ ] etoh [ ] tobacco [ ] former smoker [ ] IVDU    FAMILY HISTORY:  Family history of heart disease (Father)    REVIEW OF SYSTEMS  [  ] ROS unobtainable because:    [X] All other systems negative except as noted below:	    Constitutional:  [X] fever [X] weight loss  Skin:  [ ] rash [ ] phlebitis	  Eyes: [ ] icterus [ ] inflammation	  ENMT: [ ] discharge [ ] thrush [ ] ulcers [ ] exudates  Respiratory: [ ] dyspnea [ ] hemoptysis [ ] cough [ ] sputum	  Cardiovascular:  [ ] chest pain [ ] palpitations [ ] edema	  Gastrointestinal:  [ ] nausea [ ] vomiting [ ] diarrhea [ ] constipation [ ] pain	  Genitourinary:  [ ] dysuria [ ] frequency [ ] hematuria [ ] discharge [ ] flank pain  Musculoskeletal:  [ ] myalgias [ ] arthralgias [ ] arthritis	  Neurological:  [ ] headache [ ] seizures	  Psychiatric:  [ ] anxiety [ ] depression	  Hematology/Lymphatics:  [ ] lymphadenopathy  Endocrine:  [ ] adrenal [ ] thyroid  Allergic/Immunologic:	 [ ] transplant [ ] seasonal    Vital Signs Last 24 Hrs  T(F): 98.9 (11-14-17 @ 10:13), Max: 102.2 (11-10-17 @ 22:56)    Vital Signs Last 24 Hrs  HR: 102 (11-14-17 @ 10:13) (73 - 122)  BP: 101/73 (11-14-17 @ 10:13) (101/73 - 137/93)  RR: 18 (11-14-17 @ 10:13)  SpO2: 99% (11-14-17 @ 10:13) (96% - 100%)  Wt(kg): --    PHYSICAL EXAM:  General: non-toxic  HEAD/EYES: anicteric, PERRL  ENT:  supple  Cardiovascular:   S1, S2  Respiratory:  clear bilaterally  GI:  soft, non-tender, normal bowel sounds  :  no CVA tenderness   Musculoskeletal:  no synovitis  Neurologic:  grossly non-focal  Skin:  no rash  Lymph: no lymphadenopathy  Psychiatric:  appropriate affect  Vascular:  no phlebitis                        9.9    5.23  )-----------( 199      ( 14 Nov 2017 06:15 )             30.1   11-14    137  |  101  |  14  ----------------------------<  121<H>  3.8   |  21<L>  |  1.13    Ca    8.6      14 Nov 2017 06:15  Phos  2.5     11-14  Mg     1.9     11-14    TPro  5.9<L>  /  Alb  2.9<L>  /  TBili  2.5<H>  /  DBili  x   /  AST  29  /  ALT  33  /  AlkPhos  334<H>  11-14    MICROBIOLOGY:  BLOOD PERIPHERAL  11-13-17 --  --  --    BLOOD PERIPHERAL  11-12-17 --  --  --    URINE MIDSTREAM  11-11-17 --  --  Klebsiella pneumoniae    BLOOD  11-10-17 --  --  BLOOD CULTURE PCR  Escherichia coli    URINE MIDSTREAM  11-02-17 --  --  --      BLOOD PERIPHERAL  11-02-17 --  --  --    RADIOLOGY:    < from: CT Abdomen and Pelvis w/ Oral Cont and w/ IV Cont (11.02.17 @ 13:10) >  EXAM:  CT ABDOMEN AND PELVIS OC IC        PROCEDURE DATE:  Nov 2 2017         INTERPRETATION:  CLINICAL INFORMATION: Abdominal pain. Periampullary   cancer, recurrent. Status post Whipple procedure.    COMPARISON: PET/CT 8/7/2017, MRI 7/21/2017.    PROCEDURE:   CT of the Abdomen and Pelvis was performed with intravenous contrast.   Intravenous contrast: 90 ml Omnipaque 350. 10 ml discarded.  Oral contrast: positive contrast was administered.  Sagittal and coronal reformats were performed.    FINDINGS:    LOWER CHEST: The visualized lung bases are clear.        LIVER: There is suggestion of multiple arterially enhancing lesions at   the dome. Scattered cysts.  BILE DUCTS: Normal caliber.  GALLBLADDER: Surgically absent.  SPLEEN: Unremarkable.  PANCREAS: Post Whipple changes. Pancreas is otherwise unchanged in   appearance since prior MRI.  ADRENALS: Unremarkable.  KIDNEYS/URETERS: Multiple bilateral cysts, including a dominant   hemorrhagic cyst in the upper pole of left kidney, better characterized   on prior MRI. No hydronephrosis on the left. There is progression of   moderate right hydronephrosis secondary to the retroperitoneal mass,   located between the duodenum and IVC, which has progressed since the   PET/CT, now measuring 2.1 x 2 cm.      BLADDER: Within normal limits.  REPRODUCTIVE ORGANS: The prostate is enlarged.    BOWEL: Normal in course and caliber without obstruction. Appendix is   normal. Moderate amount of stool. A gastrojejunostomy is again noted.  PERITONEUM/RETROPERITONEUM: No pneumoperitoneum, ascites.  VESSELS: A 3.5 x 3.3 cm infrarenal aortic aneurysm. Mild vascular   calcifications.  BONES/SOFT TISSUES: Degenerative changes of the visualized axial spine.    IMPRESSION:  Status post Whipple procedurewith no significant change in appearance of   the pancreas.  Interval progression of soft tissue recurrence in the retroperitoneum   with progression in moderate right hydronephrosis.  Suggestion of multiple arterially enhancing lesions at the hepatic dome.   This can be better evaluated with MRI on outpatient basis.

## 2017-11-14 NOTE — PROGRESS NOTE ADULT - PROBLEM SELECTOR PLAN 1
Patient febrile overnight. BCx from 11/10 growing E. coli; BCx from yesterday growing GNR. UCx growing GNR 10-49k. Currently no source but most likely 2/2 recent IR procedure as operation was done by going through bowel. qSOFA score 1 (SBP<100)  - c/w Zosyn for now; will f/u cx and monitor fever curves.  - Will consider repeat CT A/P to assess RP mass/ureteral stent and for intraabdominal pathology Patient febrile overnight. BCx from 11/10 growing E. coli; BCx from yesterday growing GNR. UCx growing GNR 10-49k. Currently no source but most likely 2/2 recent IR procedure as operation was done by going through bowel. qSOFA score 0  -IR consulted for CT findings showing severe left intrahepatic biliary duct - state that stent not useful given chronic left lobe atrophy that may likely be contributing  - c/w Zosyn for now; will f/u cx and monitor fever curves. Patient febrile overnight. BCx from 11/10 growing E. coli; BCx from yesterday growing GNR. UCx growing GNR 10-49k. Currently no source but most likely 2/2 recent IR procedure as operation was done by going through bowel.  - Abx switched to IV meropenem as patient continued to spike fevers on IV Zosyn and IV Ceftriaxone.  BCx sensitivities reviewed, hope to deescalate once fever trend improves   - Appreciate ID recommendations.  Possible ascending cholangitis vs seeding from recent IR procedure.

## 2017-11-15 LAB
-  AMIKACIN: SIGNIFICANT CHANGE UP
-  AMPICILLIN/SULBACTAM: SIGNIFICANT CHANGE UP
-  AZTREONAM: SIGNIFICANT CHANGE UP
-  CEFAZOLIN: SIGNIFICANT CHANGE UP
-  CEFEPIME: SIGNIFICANT CHANGE UP
-  CEFOXITIN: SIGNIFICANT CHANGE UP
-  CEFTAZIDIME: SIGNIFICANT CHANGE UP
-  CEFTRIAXONE: SIGNIFICANT CHANGE UP
-  CEFUROXIME: SIGNIFICANT CHANGE UP
-  CIPROFLOXACIN: SIGNIFICANT CHANGE UP
-  ERTAPENEM: SIGNIFICANT CHANGE UP
-  GENTAMICIN: SIGNIFICANT CHANGE UP
-  IMIPENEM: SIGNIFICANT CHANGE UP
-  LEVOFLOXACIN: SIGNIFICANT CHANGE UP
-  MEROPENEM: SIGNIFICANT CHANGE UP
-  PIPERACILLIN/TAZOBACTAM: SIGNIFICANT CHANGE UP
-  TIGECYCLINE: SIGNIFICANT CHANGE UP
-  TRIMETHOPRIM/SULFAMETHOXAZOLE: SIGNIFICANT CHANGE UP
BACTERIA BLD CULT: SIGNIFICANT CHANGE UP
BACTERIA BLD CULT: SIGNIFICANT CHANGE UP
BUN SERPL-MCNC: 11 MG/DL — SIGNIFICANT CHANGE UP (ref 7–23)
CALCIUM SERPL-MCNC: 8.1 MG/DL — LOW (ref 8.4–10.5)
CHLORIDE SERPL-SCNC: 100 MMOL/L — SIGNIFICANT CHANGE UP (ref 98–107)
CO2 SERPL-SCNC: 23 MMOL/L — SIGNIFICANT CHANGE UP (ref 22–31)
CREAT SERPL-MCNC: 1.03 MG/DL — SIGNIFICANT CHANGE UP (ref 0.5–1.3)
GLUCOSE SERPL-MCNC: 129 MG/DL — HIGH (ref 70–99)
HCT VFR BLD CALC: 27.8 % — LOW (ref 39–50)
HGB BLD-MCNC: 9.2 G/DL — LOW (ref 13–17)
MAGNESIUM SERPL-MCNC: 1.8 MG/DL — SIGNIFICANT CHANGE UP (ref 1.6–2.6)
MCHC RBC-ENTMCNC: 29.4 PG — SIGNIFICANT CHANGE UP (ref 27–34)
MCHC RBC-ENTMCNC: 33.1 % — SIGNIFICANT CHANGE UP (ref 32–36)
MCV RBC AUTO: 88.8 FL — SIGNIFICANT CHANGE UP (ref 80–100)
METHOD TYPE: SIGNIFICANT CHANGE UP
NRBC # FLD: 0 — SIGNIFICANT CHANGE UP
ORGANISM # SPEC MICROSCOPIC CNT: SIGNIFICANT CHANGE UP
ORGANISM # SPEC MICROSCOPIC CNT: SIGNIFICANT CHANGE UP
PHOSPHATE SERPL-MCNC: 1.7 MG/DL — LOW (ref 2.5–4.5)
PLATELET # BLD AUTO: 199 K/UL — SIGNIFICANT CHANGE UP (ref 150–400)
PMV BLD: 10.6 FL — SIGNIFICANT CHANGE UP (ref 7–13)
POTASSIUM SERPL-MCNC: 3.5 MMOL/L — SIGNIFICANT CHANGE UP (ref 3.5–5.3)
POTASSIUM SERPL-SCNC: 3.5 MMOL/L — SIGNIFICANT CHANGE UP (ref 3.5–5.3)
RBC # BLD: 3.13 M/UL — LOW (ref 4.2–5.8)
RBC # FLD: 14.3 % — SIGNIFICANT CHANGE UP (ref 10.3–14.5)
SODIUM SERPL-SCNC: 137 MMOL/L — SIGNIFICANT CHANGE UP (ref 135–145)
SPECIMEN SOURCE: SIGNIFICANT CHANGE UP
WBC # BLD: 5.76 K/UL — SIGNIFICANT CHANGE UP (ref 3.8–10.5)
WBC # FLD AUTO: 5.76 K/UL — SIGNIFICANT CHANGE UP (ref 3.8–10.5)

## 2017-11-15 PROCEDURE — 99232 SBSQ HOSP IP/OBS MODERATE 35: CPT

## 2017-11-15 PROCEDURE — 74177 CT ABD & PELVIS W/CONTRAST: CPT | Mod: 26

## 2017-11-15 PROCEDURE — 99233 SBSQ HOSP IP/OBS HIGH 50: CPT | Mod: GC

## 2017-11-15 RX ORDER — ENOXAPARIN SODIUM 100 MG/ML
40 INJECTION SUBCUTANEOUS EVERY 24 HOURS
Qty: 0 | Refills: 0 | Status: DISCONTINUED | OUTPATIENT
Start: 2017-11-15 | End: 2017-11-15

## 2017-11-15 RX ORDER — SODIUM,POTASSIUM PHOSPHATES 278-250MG
1 POWDER IN PACKET (EA) ORAL
Qty: 0 | Refills: 0 | Status: COMPLETED | OUTPATIENT
Start: 2017-11-15 | End: 2017-11-15

## 2017-11-15 RX ORDER — SODIUM,POTASSIUM PHOSPHATES 278-250MG
1 POWDER IN PACKET (EA) ORAL
Qty: 0 | Refills: 0 | Status: DISCONTINUED | OUTPATIENT
Start: 2017-11-15 | End: 2017-11-15

## 2017-11-15 RX ORDER — ENOXAPARIN SODIUM 100 MG/ML
65 INJECTION SUBCUTANEOUS
Qty: 0 | Refills: 0 | Status: DISCONTINUED | OUTPATIENT
Start: 2017-11-15 | End: 2017-11-17

## 2017-11-15 RX ADMIN — FINASTERIDE 5 MILLIGRAM(S): 5 TABLET, FILM COATED ORAL at 12:09

## 2017-11-15 RX ADMIN — HYDROMORPHONE HYDROCHLORIDE 1 MILLIGRAM(S): 2 INJECTION INTRAMUSCULAR; INTRAVENOUS; SUBCUTANEOUS at 13:23

## 2017-11-15 RX ADMIN — Medication 1 TABLET(S): at 17:23

## 2017-11-15 RX ADMIN — ENOXAPARIN SODIUM 65 MILLIGRAM(S): 100 INJECTION SUBCUTANEOUS at 06:23

## 2017-11-15 RX ADMIN — Medication 1 TABLET(S): at 08:40

## 2017-11-15 RX ADMIN — SENNA PLUS 2 TABLET(S): 8.6 TABLET ORAL at 22:00

## 2017-11-15 RX ADMIN — Medication 325 MILLIGRAM(S): at 12:09

## 2017-11-15 RX ADMIN — ENOXAPARIN SODIUM 40 MILLIGRAM(S): 100 INJECTION SUBCUTANEOUS at 12:52

## 2017-11-15 RX ADMIN — Medication 100 MILLIGRAM(S): at 13:05

## 2017-11-15 RX ADMIN — POLYETHYLENE GLYCOL 3350 17 GRAM(S): 17 POWDER, FOR SOLUTION ORAL at 17:23

## 2017-11-15 RX ADMIN — HYDROMORPHONE HYDROCHLORIDE 1 MILLIGRAM(S): 2 INJECTION INTRAMUSCULAR; INTRAVENOUS; SUBCUTANEOUS at 18:16

## 2017-11-15 RX ADMIN — Medication 100 MILLIGRAM(S): at 05:18

## 2017-11-15 RX ADMIN — MEROPENEM 100 MILLIGRAM(S): 1 INJECTION INTRAVENOUS at 05:19

## 2017-11-15 RX ADMIN — TAMSULOSIN HYDROCHLORIDE 0.4 MILLIGRAM(S): 0.4 CAPSULE ORAL at 22:00

## 2017-11-15 RX ADMIN — ENOXAPARIN SODIUM 65 MILLIGRAM(S): 100 INJECTION SUBCUTANEOUS at 22:00

## 2017-11-15 RX ADMIN — HYDROMORPHONE HYDROCHLORIDE 1 MILLIGRAM(S): 2 INJECTION INTRAMUSCULAR; INTRAVENOUS; SUBCUTANEOUS at 05:30

## 2017-11-15 RX ADMIN — POLYETHYLENE GLYCOL 3350 17 GRAM(S): 17 POWDER, FOR SOLUTION ORAL at 06:25

## 2017-11-15 RX ADMIN — Medication 1 TABLET(S): at 12:09

## 2017-11-15 RX ADMIN — MEROPENEM 100 MILLIGRAM(S): 1 INJECTION INTRAVENOUS at 13:05

## 2017-11-15 RX ADMIN — MEROPENEM 100 MILLIGRAM(S): 1 INJECTION INTRAVENOUS at 22:00

## 2017-11-15 RX ADMIN — Medication 100 MILLIGRAM(S): at 22:00

## 2017-11-15 RX ADMIN — HYDROMORPHONE HYDROCHLORIDE 1 MILLIGRAM(S): 2 INJECTION INTRAMUSCULAR; INTRAVENOUS; SUBCUTANEOUS at 05:14

## 2017-11-15 RX ADMIN — HYDROMORPHONE HYDROCHLORIDE 1 MILLIGRAM(S): 2 INJECTION INTRAMUSCULAR; INTRAVENOUS; SUBCUTANEOUS at 17:51

## 2017-11-15 RX ADMIN — HYDROMORPHONE HYDROCHLORIDE 1 MILLIGRAM(S): 2 INJECTION INTRAMUSCULAR; INTRAVENOUS; SUBCUTANEOUS at 12:24

## 2017-11-15 RX ADMIN — SODIUM CHLORIDE 75 MILLILITER(S): 9 INJECTION INTRAMUSCULAR; INTRAVENOUS; SUBCUTANEOUS at 06:25

## 2017-11-15 RX ADMIN — Medication 1 TABLET(S): at 22:00

## 2017-11-15 NOTE — PROGRESS NOTE ADULT - PROBLEM SELECTOR PLAN 4
Right RP mass with multiple arterially enhancing lesions at the hepatic dome found on CT A/P concerning for recurrence of pancreatic CA vs. new malignancy  - CEA noted to be elevated to 6.3 in June  - will f/u tissue pathology per Heme/Onc recommendations  - RP mass biopsied by IR 11/9- preliminary results c/f recurrent pancreatic cancer

## 2017-11-15 NOTE — PROGRESS NOTE ADULT - SUBJECTIVE AND OBJECTIVE BOX
CC: F/U for Bacteremia    Saw/spoke to patient. No fevers, no chills. Mild abd pain today. No other complaints.    Allergies  No Known Allergies    ANTIMICROBIALS:  meropenem IVPB 1000 every 8 hours    PE:    Vital Signs Last 24 Hrs  T(C): 37.1 (15 Nov 2017 05:15), Max: 37.4 (14 Nov 2017 14:39)  T(F): 98.7 (15 Nov 2017 05:15), Max: 99.4 (14 Nov 2017 14:39)  HR: 91 (15 Nov 2017 05:15) (80 - 97)  BP: 148/83 (15 Nov 2017 05:15) (117/75 - 148/83)  BP(mean): --  RR: 18 (15 Nov 2017 05:15) (18 - 18)  SpO2: 97% (15 Nov 2017 05:15) (97% - 99%)    Gen: AOx3, NAD, non-toxic, pleasant  CV: S1+S2 normal, no murmurs, nontachycardic  Resp: Clear bilat, no resp distress, no crackles/wheezes  Abd: Soft, nontender, +BS  Ext: No LE edema, no wounds    LABS:                        9.2    5.76  )-----------( 199      ( 15 Nov 2017 06:00 )             27.8     11-15    137  |  100  |  11  ----------------------------<  129<H>  3.5   |  23  |  1.03    Ca    8.1<L>      15 Nov 2017 06:00  Phos  1.7     11-15  Mg     1.8     11-15    TPro  5.9<L>  /  Alb  2.9<L>  /  TBili  2.5<H>  /  DBili  x   /  AST  29  /  ALT  33  /  AlkPhos  334<H>  11-14    MICROBIOLOGY:    BLOOD PERIPHERAL  11-14-17 NGTD    BLOOD PERIPHERAL  11-13-17 GNR    BLOOD PERIPHERAL  11-12-17 NGTD    URINE MIDSTREAM  11-11-17 --  --  Klebsiella pneumoniae    BLOOD  11-10-17 --  --  BLOOD CULTURE PCR  Escherichia coli    RADIOLOGY:    11/15 CT A/P:  IMPRESSION:     Status post Whipple procedure with 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    Findings are discussed with Doctor Luz on 11/15/2017 10:45 AM with   read back.

## 2017-11-15 NOTE — PROGRESS NOTE ADULT - PROBLEM SELECTOR PLAN 1
Patient febrile overnight. BCx from 11/10 growing E. coli; BCx from yesterday growing GNR. UCx growing GNR 10-49k. Currently no source but most likely 2/2 recent IR procedure as operation was done by going through bowel.  - c/w meropenem; BCx sensitivities reviewed, hope to deescalate once fever trend improves   - Appreciate ID recommendations.  Possible ascending cholangitis vs seeding from recent IR procedure. Patient febrile overnight. BCx from 11/10 growing E. coli; BCx from yesterday NGTD. Currently no source but most likely 2/2 recent IR procedure as operation was done by going through bowel.  - c/w meropenem; BCx sensitivities reviewed, hope to deescalate once fever trend improves   - Appreciate ID recommendations.  Possible ascending cholangitis vs seeding from recent IR procedure. Patient remains afebrile for past 24 hours. BCx from 11/10 growing E. coli; BCx from yesterday 11/14 NGTD. Currently no source but most likely from biliary source in setting of tumor compression of biliary tract  - c/w meropenem; BCx sensitivities reviewed, hope to deescalate once fever trend improves

## 2017-11-15 NOTE — PROGRESS NOTE ADULT - ATTENDING COMMENTS
Pt seen and examined, chart and labs reviewed.  Preliminary pathology of FNA consistent with metastatic adenocarcinoma, but awaiting final report.  CT venogram reviewed with radiology and there is evidence of both tumor invasion of the IVC as well as acute thrombus, requiring therapeutic anticoagulation (started on Lovenox yesterday).  Blood cultures since yesterday have cleared - discussed with ID Dr. Livingston that likely source is from biliary tract especially in setting of tumor compression.  Agree with continuing IV meropenem for now, possible transition to PO abx if pt remains afebrile.      This unfortunately is a complicated case, which I discussed extensively today with daughter and patient.  I also communicated with patient's outpatient oncologist (Dr. Jain) who would still offer chemotherapy not for curative intent, but for palliative intent.  Surg-onc evaluated patient today, no surgical intervention indicated at this time.  Hopeful that if patient remain afebrile for next 24 hours, we will be able to transition to PO abx and discharge patient home tomorrow or Friday with followup with Dr. Jain at [facility].

## 2017-11-15 NOTE — PROGRESS NOTE ADULT - PROBLEM SELECTOR PLAN 2
Patient with Hgb drop from 11.6 on 11/11 to 9.2 this AM. Patient no longer having melanotic stools and no complaints of dizziness or light headedness, normotensive but in s/o recent IR procedure through abdomen and persistent epigastric pain. GI aware.  -continue to assess patient at bedside for signs/symptoms of acute bleed  -monitor CBCs

## 2017-11-15 NOTE — CONSULT NOTE ADULT - SUBJECTIVE AND OBJECTIVE BOX
B TEAM / GENERAL SURGERY (#[id number]) CONSULT NOTE  ---------------------------------------------------------------------------------------------     HPI:     Patient is Yi speaking only, patient's daughter at bedside and acted as , patient refused  services.      Patient is a 72y old  Male who presents with a chief complaint of abdominal pain.  Patient complained of pain radiating across his upper abdomen for several weeks prior to admission.  Patient had similar pain a few months ago, but it was short-lived and resolved.  Patient denied nausea or emesis.  During this hospitalization, patient has been found to have an RP mass concerning for recurrent disease with possible invasion of the IVC and jejunum, along with hepatic lesions.  Patient has had EGD, and IR biopsy performed of RP mass (final pathology pending).  Patient has also been found to have R hydronephrosis and has had ureteral stent placement performed during this hospitalization.  Patient has had GNR bacteremia which appears to be cleared, currently on meropenem.  Surgical team called for evaluation of any possible surgical resection in setting of likely recurrent disease.      Patient is s/p whipple procedure done in 2015, and has recovered well surgically.  Patient's pathology c/w periampullary adenocarcinoma, T3N0.  Patient had adjuvant chemoradiation of Xeloda and Gemcitabine, completed in April 2016.  Patient had complaints of abdominal pain in June 2017 and was found to have an RP mass that was FDG-avid on PET CT, but was lost to followup [place]      PAST MEDICAL & SURGICAL HISTORY:  Adenocarcinoma: Periampulla Hepatobiliary type  Pancreatic carcinoma: s/p whipple in 2015  h/o Jejunostomy tube    FAMILY HISTORY:  Family history of heart disease (Father)    ALLERGIES: No Known Allergies    CURRENT MEDICATIONS  MEDICATIONS (STANDING): docusate sodium 100 milliGRAM(s) Oral three times a day  enoxaparin Injectable 65 milliGRAM(s) SubCutaneous two times a day  ferrous    sulfate 325 milliGRAM(s) Oral daily  finasteride 5 milliGRAM(s) Oral daily  meropenem IVPB 1000 milliGRAM(s) IV Intermittent every 8 hours  polyethylene glycol 3350 17 Gram(s) Oral two times a day  potassium acid phosphate/sodium acid phosphate tablet (K-PHOS No. 2) 1 Tablet(s) Oral four times a day with meals  senna 2 Tablet(s) Oral at bedtime  sodium chloride 0.9%. 1000 milliLiter(s) IV Continuous <Continuous>  tamsulosin 0.4 milliGRAM(s) Oral at bedtime    MEDICATIONS (PRN):acetaminophen   Tablet 650 milliGRAM(s) Oral every 6 hours PRN For Temp greater than 38 C (100.4 F)  acetaminophen   Tablet. 650 milliGRAM(s) Oral every 6 hours PRN Mild to moderate pain 1-5  HYDROmorphone  Injectable 1 milliGRAM(s) IV Push every 6 hours PRN Moderate to severe pain 6-10  simethicone 80 milliGRAM(s) Chew every 6 hours PRN Dyspepsia    --------------------------------------------------------------------------------------------    Vitals:   T(C): 37.1 (11-15-17 @ 05:15), Max: 37.4 (11-14-17 @ 14:39)  HR: 91 (11-15-17 @ 05:15) (80 - 97)  BP: 148/83 (11-15-17 @ 05:15) (117/75 - 148/83)  RR: 18 (11-15-17 @ 05:15) (18 - 18)  SpO2: 97% (11-15-17 @ 05:15) (97% - 99%)    PHYSICAL EXAM:   General: NAD, Sitting in bed comfortably  Neuro: A+O  GI/Abd: Soft, ND, mild tenderness in epigastric area, no rebound/guarding, no masses palpated, well-healed upper abdominal incision, dressing from IR procedure in place in [facility] C/D/I.    Vascular: All 4 extremities warm.    --------------------------------------------------------------------------------------------    LABS  CBC (11-15 @ 06:00)                              9.2<L>                         5.76    )----------------(  199        --    % Neutrophils, --    % Lymphocytes, ANC: --                                  27.8<L>  CBC (11-14 @ 06:15)                              9.9<L>                         5.23    )----------------(  199        81.2<H>% Neutrophils, 9.2<L>% Lymphocytes, ANC: 4.25                                30.1<L>    BMP (11-15 @ 06:00)             137     |  100     |  11    		Ca++ --      Ca 8.1<L>             ---------------------------------( 129<H>		Mg 1.8                3.5     |  23      |  1.03  			Ph 1.7<L>  BMP (11-14 @ 06:15)             137     |  101     |  14    		Ca++ --      Ca 8.6                ---------------------------------( 121<H>		Mg 1.9                3.8     |  21<L>   |  1.13  			Ph 2.5       LFTs (11-14 @ 06:15)      TPro 5.9<L> / Alb 2.9<L> / TBili 2.5<H> / DBili -- / AST 29 / ALT 33 / AlkPhos 334<H>    --------------------------------------------------------------------------------------------    MICROBIOLOGY    -> BLOOD PERIPHERAL Culture (11-14 @ 08:54)     NG    NG  NG    -> BLOOD PERIPHERAL Culture (11-13 @ 09:07)       ***** CRITICAL RESULT *****  PERSON CALLED / READ-BACK: HY,REGHU,RN/Y  DATE / TIME CALLED: 11/13/17 2237  CALLED BY: PRINCE NOE  GNR^Gram Neg Rods  AFTER: 12 HOURS INCUBATION  BOTTLE: AEROBIC BOTTLE    NG  NG    -> BLOOD PERIPHERAL Culture (11-12 @ 06:52)       ***** CRITICAL RESULT *****  PERSON CALLED / READ-BACK: JAMES HERNÁNDEZ RN. / Y  DATE / TIME CALLED: 11/12/17 1916  CALLED BY: SONA KHAN                *******************************                * This is an appended result. *      *******************************  A prior result that was reported as final has been changed.  GNR^Gram Neg Rods  AFTER: 11 HOURS INCUBATION  BOTTLE: AEROBIC   ANAEROBIC BOTTLES    NG  NG    -> URINE MIDSTREAM Culture (11-11 @ 09:39)     NG    Klebsiella pneumoniae  NG    -> BLOOD Culture (11-10 @ 22:58)       ***** CRITICAL RESULT *****  PERSON CALLED / READ-BACK: SHEILA MUNGUIA RN. / Y  DATE / TIME CALLED: 11/11/17 1746  CALLED BY: SONA KHAN  GNR^Gram Neg Rods  AFTER: 18 HOURS INCUBATION  BOTTLE: ANAEROBIC BOTTLE    BLOOD CULTURE PCR  Escherichia coli  NG      --------------------------------------------------------------------------------------------    IMAGING  < from: CT Abdomen and Pelvis w/ IV Cont (11.15.17 @ 09:50) >  IMPRESSION:     Status post Whipple procedure with 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    < end of copied text >    < from: CT Abdomen and Pelvis w/ Oral Cont and w/ IV Cont (11.13.17 @ 21:45) >  IMPRESSION:     Placement of a right nephroureteral stent with interval resolution of   right-sided hydronephrosis.  Persistent hypoenhancement of the right   kidney.  Bladder wall thickening. Correlate for underlying infection.  Retroperitoneal soft tissue mass inseparable from the IVC and jejunum.   Filling defect within the IVC which probably represents thrombus.   Distended infrarenal IVC and common iliac veins, suspicious for thrombus.    < end of copied text >    < from: CT Abdomen and Pelvis w/ Oral Cont and w/ IV Cont (11.02.17 @ 13:10) >  IMPRESSION:  Status post Whipple procedurewith no significant change in appearance of   the pancreas.  Interval progression of soft tissue recurrence in the retroperitoneum   with progression in moderate right hydronephrosis.  Suggestion of multiple arterially enhancing lesions at the hepatic dome.   This can be better evaluated with MRI on outpatient basis.    < end of copied text >      --------------------------------------------------------------------------------------------

## 2017-11-15 NOTE — CONSULT NOTE ADULT - ASSESSMENT
ASSESSMENT: Patient is a 72y old m with with h/o periampullary adenocarcinoma s/p whipple (2015) and adjuvant chemoradiation with likely recurrent disease, RP mass.      PLAN:    - No acute surgical intervention indicated  - f/u Pathology from IR biopsy of RP mass.   - ?hepatic mets with lesions at hepatic dome.  - No clinical evidence of bowel obstruction from tumor burden  - C/w abx for GNR bacteremia per ID  - Will Follow.   - Patient and plan discussed with Attending, Dr. Rojo.     Treva Mike MD PGY3  B Team Surgery, #[id number]

## 2017-11-15 NOTE — CONSULT NOTE ADULT - ATTENDING COMMENTS
Pt seen and examined.  Agree with resident note.  CT scan reviewed.  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  Will need oncology eval and chemotherapy.  No surgical intervention at this time.  Pt tolerating diet and denies abdominal pain.

## 2017-11-15 NOTE — PROGRESS NOTE ADULT - PROBLEM SELECTOR PLAN 5
Epigastric pain that he states is similar in nature and intensity to his chronic pain but tenderness also spreading to RUQ over recent surgery site although with no discharge, warmth, surrounding erythema. CT abdomen can not r/o IVC thrombus but otherwise no acute findings.  -f/u CT venogram to further evaluate possible IVC thrombus  - will cont to monitor  - Tylenol PRN for mild to moderate pain  - c/w Dilaudid 1mg IV q6h PRN for severe pain yesterday; plan to transition to PO as tolerated possibly tomorrow  - cont bowel regimen and monitor Mild epigastric pain that he states is similar in nature and intensity to his chronic pain. CT abdomen can not r/o IVC thrombus but otherwise no acute findings.  -f/u CT venogram to further evaluate possible IVC thrombus  - will cont to monitor  - Tylenol PRN for mild to moderate pain  - c/w Dilaudid 1mg IV q6h PRN for severe pain yesterday; plan to transition to PO as tolerated possibly tomorrow  - cont bowel regimen and monitor

## 2017-11-15 NOTE — PROGRESS NOTE ADULT - PROBLEM SELECTOR PLAN 7
RP mass visualized on CT A/P with R hydronephrosis. Recurrence of pancreatic cancer vs new malignancy?   -f/u RP mass biopsy pathology final read (preliminary results c/f recurrent panc ca)  - F/u Heme/onc recs  - Patient will then f/u w/ heme/onc as outpatient

## 2017-11-15 NOTE — PROGRESS NOTE ADULT - SUBJECTIVE AND OBJECTIVE BOX
Patient is a 72y old  Male who presents with a chief complaint of Came for complaints of Epigastric Pain radiating from the right side Abdomen to the left side, no nausea or vomiting (05 Nov 2017 09:51)      SUBJECTIVE / OVERNIGHT EVENTS: Patient complaining of mild epigastric pain this AM but improving after recent dilaudid PRN. Patient otherwise feels much better than yesterday with no other complaints. Last BM was yesterday - loose and brown. Denies dizziness, light headedness, dizziness, SOB, CP, palps, n/v/d/c.     MEDICATIONS  (STANDING):  docusate sodium 100 milliGRAM(s) Oral three times a day  enoxaparin Injectable 65 milliGRAM(s) SubCutaneous two times a day  ferrous    sulfate 325 milliGRAM(s) Oral daily  finasteride 5 milliGRAM(s) Oral daily  meropenem IVPB 1000 milliGRAM(s) IV Intermittent every 8 hours  polyethylene glycol 3350 17 Gram(s) Oral two times a day  potassium acid phosphate/sodium acid phosphate tablet (K-PHOS No. 2) 1 Tablet(s) Oral four times a day with meals  senna 2 Tablet(s) Oral at bedtime  sodium chloride 0.9%. 1000 milliLiter(s) (75 mL/Hr) IV Continuous <Continuous>  tamsulosin 0.4 milliGRAM(s) Oral at bedtime    MEDICATIONS  (PRN):  acetaminophen   Tablet 650 milliGRAM(s) Oral every 6 hours PRN For Temp greater than 38 C (100.4 F)  acetaminophen   Tablet. 650 milliGRAM(s) Oral every 6 hours PRN Mild to moderate pain 1-5  HYDROmorphone  Injectable 1 milliGRAM(s) IV Push every 6 hours PRN Moderate to severe pain 6-10  simethicone 80 milliGRAM(s) Chew every 6 hours PRN Dyspepsia        CAPILLARY BLOOD GLUCOSE        I&O's Summary    14 Nov 2017 07:01  -  15 Nov 2017 07:00  --------------------------------------------------------  IN: 1750 mL / OUT: 600 mL / NET: 1150 mL        PHYSICAL EXAM:  VS: T 98.7, HR 91, /83, RR 18, SpO2 97% on RA  GENERAL: NAD  HEAD:  Atraumatic, Normocephalic  EYES: EOMI, PERRLA, conjunctiva and sclera clear  ENMT: No tonsillar erythema, exudates, or enlargement; Moist mucous membranes, Good dentition, No lesions  NECK: Supple, No JVD  NERVOUS SYSTEM:  Alert & Oriented X3, Good concentration; Motor Strength 5/5 B/L upper and lower extremities  CHEST/LUNG: Clear to auscultation bilaterally; No rales, rhonchi, wheezing, or rubs  HEART: Regular rate and rhythm; No murmurs, rubs, or gallops  ABDOMEN: Surgery site with dressing that is c/d/i - no discharge, no tenderness or surrounding erythema. Bowel Sounds present, soft, abdomen nondistended, tender in epigastric area (not at surgery site), No CVA tenderness or flank pain. Chevron scar well healed, nondistended, no guarding, no rebound  EXTREMITIES:  2+ Peripheral Pulses, No clubbing, cyanosis, or edema  LYMPH: No lymphadenopathy noted  SKIN: No rashes or lesions        LABS:                        9.2    5.76  )-----------( 199      ( 15 Nov 2017 06:00 )             27.8     11-15    137  |  100  |  11  ----------------------------<  129<H>  3.5   |  23  |  1.03    Ca    8.1<L>      15 Nov 2017 06:00  Phos  1.7     11-15  Mg     1.8     11-15    TPro  5.9<L>  /  Alb  2.9<L>  /  TBili  2.5<H>  /  DBili  x   /  AST  29  /  ALT  33  /  AlkPhos  334<H>  11-14              RADIOLOGY & ADDITIONAL TESTS:    LE doppler 11/15:  IMPRESSION:   No evidence of bilateral lower extremity deep venous thrombosis.    CT venogram: ordered Patient is a 72y old  Male who presents with a chief complaint of Came for complaints of Epigastric Pain radiating from the right side Abdomen to the left side, no nausea or vomiting (05 Nov 2017 09:51)      SUBJECTIVE / OVERNIGHT EVENTS: Patient complaining of mild epigastric pain this AM but improving after recent dilaudid PRN. Patient otherwise feels much better than yesterday with no other complaints. Last BM was yesterday - loose and brown. Denies dizziness, light headedness, dizziness, SOB, CP, palps, n/v/d/c.     MEDICATIONS  (STANDING):  docusate sodium 100 milliGRAM(s) Oral three times a day  enoxaparin Injectable 65 milliGRAM(s) SubCutaneous two times a day  ferrous    sulfate 325 milliGRAM(s) Oral daily  finasteride 5 milliGRAM(s) Oral daily  meropenem IVPB 1000 milliGRAM(s) IV Intermittent every 8 hours  polyethylene glycol 3350 17 Gram(s) Oral two times a day  potassium acid phosphate/sodium acid phosphate tablet (K-PHOS No. 2) 1 Tablet(s) Oral four times a day with meals  senna 2 Tablet(s) Oral at bedtime  sodium chloride 0.9%. 1000 milliLiter(s) (75 mL/Hr) IV Continuous <Continuous>  tamsulosin 0.4 milliGRAM(s) Oral at bedtime    MEDICATIONS  (PRN):  acetaminophen   Tablet 650 milliGRAM(s) Oral every 6 hours PRN For Temp greater than 38 C (100.4 F)  acetaminophen   Tablet. 650 milliGRAM(s) Oral every 6 hours PRN Mild to moderate pain 1-5  HYDROmorphone  Injectable 1 milliGRAM(s) IV Push every 6 hours PRN Moderate to severe pain 6-10  simethicone 80 milliGRAM(s) Chew every 6 hours PRN Dyspepsia        CAPILLARY BLOOD GLUCOSE        I&O's Summary    14 Nov 2017 07:01  -  15 Nov 2017 07:00  --------------------------------------------------------  IN: 1750 mL / OUT: 600 mL / NET: 1150 mL        PHYSICAL EXAM:  VS: T 98.7, HR 91, /83, RR 18, SpO2 97% on RA  GENERAL: NAD  HEAD:  Atraumatic, Normocephalic  EYES: EOMI, PERRLA, conjunctiva and sclera clear  ENMT: No tonsillar erythema, exudates, or enlargement; Moist mucous membranes, Good dentition, No lesions  NECK: Supple, No JVD  NERVOUS SYSTEM:  Alert & Oriented X3, Good concentration; Motor Strength 5/5 B/L upper and lower extremities  CHEST/LUNG: Clear to auscultation bilaterally; No rales, rhonchi, wheezing, or rubs  HEART: Regular rate and rhythm; No murmurs, rubs, or gallops  ABDOMEN: Surgery site with dressing that is c/d/i - no discharge, no tenderness or surrounding erythema. Bowel Sounds present, soft, abdomen nondistended, tender in epigastric area (not at surgery site), No CVA tenderness or flank pain. Chevron scar well healed, nondistended, no guarding, no rebound  EXTREMITIES:  2+ Peripheral Pulses, No clubbing, cyanosis, or edema  LYMPH: No lymphadenopathy noted  SKIN: No rashes or lesions        LABS:                        9.2    5.76  )-----------( 199      ( 15 Nov 2017 06:00 )             27.8     11-15    137  |  100  |  11  ----------------------------<  129<H>  3.5   |  23  |  1.03    Ca    8.1<L>      15 Nov 2017 06:00  Phos  1.7     11-15  Mg     1.8     11-15    TPro  5.9<L>  /  Alb  2.9<L>  /  TBili  2.5<H>  /  DBili  x   /  AST  29  /  ALT  33  /  AlkPhos  334<H>  11-14      BCx from 11/14: NGTD      RADIOLOGY & ADDITIONAL TESTS:    LE doppler 11/15:  IMPRESSION:   No evidence of bilateral lower extremity deep venous thrombosis.    CT venogram: ordered

## 2017-11-16 ENCOUNTER — APPOINTMENT (OUTPATIENT)
Dept: HEMATOLOGY ONCOLOGY | Facility: CLINIC | Age: 72
End: 2017-11-16

## 2017-11-16 LAB
-  AMIKACIN: SIGNIFICANT CHANGE UP
-  AMPICILLIN/SULBACTAM: SIGNIFICANT CHANGE UP
-  AZTREONAM: SIGNIFICANT CHANGE UP
-  CEFAZOLIN: SIGNIFICANT CHANGE UP
-  CEFEPIME: SIGNIFICANT CHANGE UP
-  CEFOXITIN: SIGNIFICANT CHANGE UP
-  CEFTAZIDIME: SIGNIFICANT CHANGE UP
-  CEFTRIAXONE: SIGNIFICANT CHANGE UP
-  CEFUROXIME: SIGNIFICANT CHANGE UP
-  CIPROFLOXACIN: SIGNIFICANT CHANGE UP
-  ERTAPENEM: SIGNIFICANT CHANGE UP
-  GENTAMICIN: SIGNIFICANT CHANGE UP
-  IMIPENEM: SIGNIFICANT CHANGE UP
-  LEVOFLOXACIN: SIGNIFICANT CHANGE UP
-  MEROPENEM: SIGNIFICANT CHANGE UP
-  PIPERACILLIN/TAZOBACTAM: SIGNIFICANT CHANGE UP
-  TIGECYCLINE: SIGNIFICANT CHANGE UP
-  TRIMETHOPRIM/SULFAMETHOXAZOLE: SIGNIFICANT CHANGE UP
ALBUMIN SERPL ELPH-MCNC: 2.7 G/DL — LOW (ref 3.3–5)
ALP SERPL-CCNC: 315 U/L — HIGH (ref 40–120)
ALT FLD-CCNC: 31 U/L — SIGNIFICANT CHANGE UP (ref 4–41)
AST SERPL-CCNC: 30 U/L — SIGNIFICANT CHANGE UP (ref 4–40)
BACTERIA BLD CULT: SIGNIFICANT CHANGE UP
BASOPHILS # BLD AUTO: 0.03 K/UL — SIGNIFICANT CHANGE UP (ref 0–0.2)
BASOPHILS NFR BLD AUTO: 0.7 % — SIGNIFICANT CHANGE UP (ref 0–2)
BILIRUB SERPL-MCNC: 2.3 MG/DL — HIGH (ref 0.2–1.2)
BLD GP AB SCN SERPL QL: NEGATIVE — SIGNIFICANT CHANGE UP
BUN SERPL-MCNC: 13 MG/DL — SIGNIFICANT CHANGE UP (ref 7–23)
CALCIUM SERPL-MCNC: 8.6 MG/DL — SIGNIFICANT CHANGE UP (ref 8.4–10.5)
CHLORIDE SERPL-SCNC: 98 MMOL/L — SIGNIFICANT CHANGE UP (ref 98–107)
CO2 SERPL-SCNC: 19 MMOL/L — LOW (ref 22–31)
CREAT SERPL-MCNC: 0.95 MG/DL — SIGNIFICANT CHANGE UP (ref 0.5–1.3)
EOSINOPHIL # BLD AUTO: 0.11 K/UL — SIGNIFICANT CHANGE UP (ref 0–0.5)
EOSINOPHIL NFR BLD AUTO: 2.7 % — SIGNIFICANT CHANGE UP (ref 0–6)
GLUCOSE SERPL-MCNC: 82 MG/DL — SIGNIFICANT CHANGE UP (ref 70–99)
HCT VFR BLD CALC: 28 % — LOW (ref 39–50)
HGB BLD-MCNC: 9.4 G/DL — LOW (ref 13–17)
IMM GRANULOCYTES # BLD AUTO: 0.02 # — SIGNIFICANT CHANGE UP
IMM GRANULOCYTES NFR BLD AUTO: 0.5 % — SIGNIFICANT CHANGE UP (ref 0–1.5)
LYMPHOCYTES # BLD AUTO: 0.59 K/UL — LOW (ref 1–3.3)
LYMPHOCYTES # BLD AUTO: 14.5 % — SIGNIFICANT CHANGE UP (ref 13–44)
MAGNESIUM SERPL-MCNC: 2.1 MG/DL — SIGNIFICANT CHANGE UP (ref 1.6–2.6)
MCHC RBC-ENTMCNC: 29.6 PG — SIGNIFICANT CHANGE UP (ref 27–34)
MCHC RBC-ENTMCNC: 33.6 % — SIGNIFICANT CHANGE UP (ref 32–36)
MCV RBC AUTO: 88.1 FL — SIGNIFICANT CHANGE UP (ref 80–100)
METHOD TYPE: SIGNIFICANT CHANGE UP
MONOCYTES # BLD AUTO: 0.35 K/UL — SIGNIFICANT CHANGE UP (ref 0–0.9)
MONOCYTES NFR BLD AUTO: 8.6 % — SIGNIFICANT CHANGE UP (ref 2–14)
NEUTROPHILS # BLD AUTO: 2.98 K/UL — SIGNIFICANT CHANGE UP (ref 1.8–7.4)
NEUTROPHILS NFR BLD AUTO: 73 % — SIGNIFICANT CHANGE UP (ref 43–77)
NRBC # FLD: 0 — SIGNIFICANT CHANGE UP
ORGANISM # SPEC MICROSCOPIC CNT: SIGNIFICANT CHANGE UP
ORGANISM # SPEC MICROSCOPIC CNT: SIGNIFICANT CHANGE UP
PHOSPHATE SERPL-MCNC: 2.6 MG/DL — SIGNIFICANT CHANGE UP (ref 2.5–4.5)
PLATELET # BLD AUTO: 242 K/UL — SIGNIFICANT CHANGE UP (ref 150–400)
PMV BLD: 10.8 FL — SIGNIFICANT CHANGE UP (ref 7–13)
POTASSIUM SERPL-MCNC: 3.5 MMOL/L — SIGNIFICANT CHANGE UP (ref 3.5–5.3)
POTASSIUM SERPL-SCNC: 3.5 MMOL/L — SIGNIFICANT CHANGE UP (ref 3.5–5.3)
PROT SERPL-MCNC: 5.8 G/DL — LOW (ref 6–8.3)
RBC # BLD: 3.18 M/UL — LOW (ref 4.2–5.8)
RBC # FLD: 14.5 % — SIGNIFICANT CHANGE UP (ref 10.3–14.5)
RH IG SCN BLD-IMP: POSITIVE — SIGNIFICANT CHANGE UP
SODIUM SERPL-SCNC: 135 MMOL/L — SIGNIFICANT CHANGE UP (ref 135–145)
WBC # BLD: 4.08 K/UL — SIGNIFICANT CHANGE UP (ref 3.8–10.5)
WBC # FLD AUTO: 4.08 K/UL — SIGNIFICANT CHANGE UP (ref 3.8–10.5)

## 2017-11-16 PROCEDURE — 99233 SBSQ HOSP IP/OBS HIGH 50: CPT | Mod: GC

## 2017-11-16 PROCEDURE — 99232 SBSQ HOSP IP/OBS MODERATE 35: CPT

## 2017-11-16 RX ORDER — POTASSIUM CHLORIDE 20 MEQ
40 PACKET (EA) ORAL ONCE
Qty: 0 | Refills: 0 | Status: COMPLETED | OUTPATIENT
Start: 2017-11-16 | End: 2017-11-16

## 2017-11-16 RX ORDER — HYDROMORPHONE HYDROCHLORIDE 2 MG/ML
2 INJECTION INTRAMUSCULAR; INTRAVENOUS; SUBCUTANEOUS
Qty: 0 | Refills: 0 | Status: DISCONTINUED | OUTPATIENT
Start: 2017-11-16 | End: 2017-11-17

## 2017-11-16 RX ORDER — HYDROMORPHONE HYDROCHLORIDE 2 MG/ML
5 INJECTION INTRAMUSCULAR; INTRAVENOUS; SUBCUTANEOUS EVERY 6 HOURS
Qty: 0 | Refills: 0 | Status: DISCONTINUED | OUTPATIENT
Start: 2017-11-16 | End: 2017-11-16

## 2017-11-16 RX ORDER — ONDANSETRON 8 MG/1
8 TABLET, FILM COATED ORAL ONCE
Qty: 0 | Refills: 0 | Status: COMPLETED | OUTPATIENT
Start: 2017-11-16 | End: 2017-11-16

## 2017-11-16 RX ORDER — HYDROMORPHONE HYDROCHLORIDE 2 MG/ML
6 INJECTION INTRAMUSCULAR; INTRAVENOUS; SUBCUTANEOUS EVERY 6 HOURS
Qty: 0 | Refills: 0 | Status: DISCONTINUED | OUTPATIENT
Start: 2017-11-16 | End: 2017-11-17

## 2017-11-16 RX ADMIN — ONDANSETRON 8 MILLIGRAM(S): 8 TABLET, FILM COATED ORAL at 16:06

## 2017-11-16 RX ADMIN — TAMSULOSIN HYDROCHLORIDE 0.4 MILLIGRAM(S): 0.4 CAPSULE ORAL at 22:28

## 2017-11-16 RX ADMIN — HYDROMORPHONE HYDROCHLORIDE 1 MILLIGRAM(S): 2 INJECTION INTRAMUSCULAR; INTRAVENOUS; SUBCUTANEOUS at 06:04

## 2017-11-16 RX ADMIN — POLYETHYLENE GLYCOL 3350 17 GRAM(S): 17 POWDER, FOR SOLUTION ORAL at 17:16

## 2017-11-16 RX ADMIN — Medication 100 MILLIGRAM(S): at 22:28

## 2017-11-16 RX ADMIN — HYDROMORPHONE HYDROCHLORIDE 1 MILLIGRAM(S): 2 INJECTION INTRAMUSCULAR; INTRAVENOUS; SUBCUTANEOUS at 06:19

## 2017-11-16 RX ADMIN — SENNA PLUS 2 TABLET(S): 8.6 TABLET ORAL at 22:28

## 2017-11-16 RX ADMIN — MEROPENEM 100 MILLIGRAM(S): 1 INJECTION INTRAVENOUS at 22:28

## 2017-11-16 RX ADMIN — Medication 650 MILLIGRAM(S): at 11:30

## 2017-11-16 RX ADMIN — Medication 40 MILLIEQUIVALENT(S): at 14:55

## 2017-11-16 RX ADMIN — POLYETHYLENE GLYCOL 3350 17 GRAM(S): 17 POWDER, FOR SOLUTION ORAL at 06:05

## 2017-11-16 RX ADMIN — HYDROMORPHONE HYDROCHLORIDE 6 MILLIGRAM(S): 2 INJECTION INTRAMUSCULAR; INTRAVENOUS; SUBCUTANEOUS at 17:16

## 2017-11-16 RX ADMIN — HYDROMORPHONE HYDROCHLORIDE 1 MILLIGRAM(S): 2 INJECTION INTRAMUSCULAR; INTRAVENOUS; SUBCUTANEOUS at 00:41

## 2017-11-16 RX ADMIN — Medication 100 MILLIGRAM(S): at 06:06

## 2017-11-16 RX ADMIN — MEROPENEM 100 MILLIGRAM(S): 1 INJECTION INTRAVENOUS at 06:06

## 2017-11-16 RX ADMIN — MEROPENEM 100 MILLIGRAM(S): 1 INJECTION INTRAVENOUS at 13:29

## 2017-11-16 RX ADMIN — ENOXAPARIN SODIUM 65 MILLIGRAM(S): 100 INJECTION SUBCUTANEOUS at 06:06

## 2017-11-16 RX ADMIN — Medication 325 MILLIGRAM(S): at 11:03

## 2017-11-16 RX ADMIN — HYDROMORPHONE HYDROCHLORIDE 6 MILLIGRAM(S): 2 INJECTION INTRAMUSCULAR; INTRAVENOUS; SUBCUTANEOUS at 23:02

## 2017-11-16 RX ADMIN — HYDROMORPHONE HYDROCHLORIDE 6 MILLIGRAM(S): 2 INJECTION INTRAMUSCULAR; INTRAVENOUS; SUBCUTANEOUS at 17:46

## 2017-11-16 RX ADMIN — ENOXAPARIN SODIUM 65 MILLIGRAM(S): 100 INJECTION SUBCUTANEOUS at 17:15

## 2017-11-16 RX ADMIN — HYDROMORPHONE HYDROCHLORIDE 1 MILLIGRAM(S): 2 INJECTION INTRAMUSCULAR; INTRAVENOUS; SUBCUTANEOUS at 00:56

## 2017-11-16 RX ADMIN — Medication 100 MILLIGRAM(S): at 13:29

## 2017-11-16 RX ADMIN — Medication 650 MILLIGRAM(S): at 11:00

## 2017-11-16 RX ADMIN — FINASTERIDE 5 MILLIGRAM(S): 5 TABLET, FILM COATED ORAL at 11:03

## 2017-11-16 NOTE — PROVIDER CONTACT NOTE (OTHER) - ACTION/TREATMENT ORDERED:
pain medication prn given
MD Lambert aware, Tylenol given as ordered, will continue to monitor
MD Lambert aware, no further orders at this time, will continue to monitor
pain medication prn given

## 2017-11-16 NOTE — PROGRESS NOTE ADULT - ATTENDING COMMENTS
Pt seen and examined, chart and labs reviewed.  Pt feels comfortable currently; having a difficult time absorbing all of the information, but anxious about going home.  Case has been discussed with surg-onc (no plans for surgical intervention) and oncology team, who would like to followup with patient at [facility] once discharged for possible further chemotherapy options.  Pt remains afebrile, Bcx have now cleared.  Bcx now also growing carbapenem resistant Aeromonas, for which Levaquin was started.  Anticipate that patient will be discharged on PO Levaquin.  We have changed IV Dilaudid to PO Dilaudid and if pain remains controlled, plan to d/c home in AM with outpatient oncology followup.

## 2017-11-16 NOTE — PROVIDER CONTACT NOTE (OTHER) - ASSESSMENT
pt is c/o of pain 9/10 moaning in bed.
Patient asymptomatic, no acute distress noted
Patient asymptomatic, no acute distress noted
pt is c/o of pain 6/10 moaning in bed.

## 2017-11-16 NOTE — PROGRESS NOTE ADULT - PROBLEM SELECTOR PLAN 1
Patient remains afebrile for past 24 hours. BCx from 11/10 growing E. coli; BCx from 11/14 NGTD. Currently no source but most likely from biliary source in setting of tumor compression of biliary tract  - c/w levofloxacin as patient has meropenem-resistant aeromonas hydrophila bacteremia

## 2017-11-16 NOTE — PROGRESS NOTE ADULT - SUBJECTIVE AND OBJECTIVE BOX
CC: F/U polymicrobial bacteremia    Saw/spoke to patient. Patient appears well, unchanged. Found to have further bacteria in blood, abx adjusted. Otherwise appears well.    Allergies  No Known Allergies    ANTIMICROBIALS:  levoFLOXacin IVPB    levoFLOXacin IVPB 750 every 24 hours  meropenem IVPB 1000 every 8 hours    PE:    Vital Signs Last 24 Hrs  T(C): 37.1 (16 Nov 2017 06:03), Max: 37.1 (15 Nov 2017 21:50)  T(F): 98.7 (16 Nov 2017 06:03), Max: 98.7 (15 Nov 2017 21:50)  HR: 91 (16 Nov 2017 06:03) (78 - 91)  BP: 152/100 (16 Nov 2017 06:03) (133/86 - 152/100)  BP(mean): --  RR: 18 (16 Nov 2017 06:03) (18 - 18)  SpO2: 100% (16 Nov 2017 06:03) (100% - 100%)    Gen: AOx3, NAD, non-toxic, pleasant  CV: S1+S2 normal, no murmurs, nontachycardic  Resp: Clear bilat, no resp distress, no crackles/wheezes  Abd: Soft, nontender, +BS  Ext: No LE edema, no wounds    LABS:                        9.4    4.08  )-----------( 242      ( 16 Nov 2017 06:34 )             28.0     11-16    135  |  98  |  13  ----------------------------<  82  3.5   |  19<L>  |  0.95    Ca    8.6      16 Nov 2017 06:34  Phos  2.6     11-16  Mg     2.1     11-16    TPro  5.8<L>  /  Alb  2.7<L>  /  TBili  2.3<H>  /  DBili  x   /  AST  30  /  ALT  31  /  AlkPhos  315<H>  11-16    MICROBIOLOGY:    BLOOD PERIPHERAL  11-14-17 NGTD    BLOOD PERIPHERAL  11-13-17 GNR    BLOOD PERIPHERAL  11-12-17 --  --  Aeromonas hydrophila grp  S FQ    URINE MIDSTREAM  11-11-17 --  --  Klebsiella pneumoniae S FQ, Reuben    BLOOD  11-10-17 --  --  BLOOD CULTURE PCR  Escherichia coli    RADIOLOGY:    11/15 CT A/P:    IMPRESSION:     Status post Whipple procedure with 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    Findings are discussed with Doctor Escobar on 11/15/2017 10:45 AM with   read back.

## 2017-11-16 NOTE — PROGRESS NOTE ADULT - PROBLEM SELECTOR PLAN 5
Mild epigastric pain that he states is similar in nature and intensity to his chronic pain. CT A/P showing IVC tumor thrombus.  -surgery report no surgical intervention necessary at this time  - will cont to monitor  - Tylenol PRN for mild to moderate pain  - c/w Dilaudid 1mg IV q6h PRN for severe pain yesterday; plan to transition to PO as tolerated possibly tomorrow  - cont bowel regimen and monitor

## 2017-11-16 NOTE — PROGRESS NOTE ADULT - SUBJECTIVE AND OBJECTIVE BOX
Patient is a 72y old  Male who presents with a chief complaint of Came for complaints of Epigastric Pain radiating from the right side Abdomen to the left side, no nausea or vomiting (05 Nov 2017 09:51)      SUBJECTIVE / OVERNIGHT EVENTS: Patient switched to levofloxacin yesterday after being found to have aeromonas hydrophila bacteremia resistant to meropenem. No fevers or other acute events overnight. Patient only with mild epigastric pain this AM responding well to recent dilaudid. Denies any CP, light headedness, dizziness, SOB, hematochezia, melena, f/c, n/v/d/c.    MEDICATIONS  (STANDING):  docusate sodium 100 milliGRAM(s) Oral three times a day  enoxaparin Injectable 65 milliGRAM(s) SubCutaneous two times a day  ferrous    sulfate 325 milliGRAM(s) Oral daily  finasteride 5 milliGRAM(s) Oral daily  levoFLOXacin IVPB      levoFLOXacin IVPB 750 milliGRAM(s) IV Intermittent every 24 hours  meropenem IVPB 1000 milliGRAM(s) IV Intermittent every 8 hours  polyethylene glycol 3350 17 Gram(s) Oral two times a day  potassium chloride    Tablet ER 40 milliEquivalent(s) Oral once  senna 2 Tablet(s) Oral at bedtime  sodium chloride 0.9%. 1000 milliLiter(s) (75 mL/Hr) IV Continuous <Continuous>  tamsulosin 0.4 milliGRAM(s) Oral at bedtime    MEDICATIONS  (PRN):  acetaminophen   Tablet 650 milliGRAM(s) Oral every 6 hours PRN For Temp greater than 38 C (100.4 F)  acetaminophen   Tablet. 650 milliGRAM(s) Oral every 6 hours PRN Mild to moderate pain 1-5  HYDROmorphone  Injectable 1 milliGRAM(s) IV Push every 6 hours PRN Moderate to severe pain 6-10  simethicone 80 milliGRAM(s) Chew every 6 hours PRN Dyspepsia        CAPILLARY BLOOD GLUCOSE        I&O's Summary    15 Nov 2017 07:01  -  16 Nov 2017 07:00  --------------------------------------------------------  IN: 600 mL / OUT: 300 mL / NET: 300 mL        PHYSICAL EXAM:  VS: T 98.7, HR 91, /100, RR 18, SpO2 100% on RA  GENERAL: NAD  HEAD:  Atraumatic, Normocephalic  EYES: EOMI, PERRLA, conjunctiva and sclera clear  ENMT: No tonsillar erythema, exudates, or enlargement; Moist mucous membranes, Good dentition, No lesions  NECK: Supple, No JVD  NERVOUS SYSTEM:  Alert & Oriented X3, Good concentration; Motor Strength 5/5 B/L upper and lower extremities  CHEST/LUNG: Clear to auscultation bilaterally; No rales, rhonchi, wheezing, or rubs  HEART: Regular rate and rhythm; No murmurs, rubs, or gallops  ABDOMEN: Surgery site with dressing that is c/d/i - no discharge, no tenderness or surrounding erythema. Bowel Sounds present, soft, abdomen nondistended, tender in epigastric area (not at surgery site), No CVA tenderness or flank pain. Chevron scar well healed, nondistended, no guarding, no rebound  EXTREMITIES:  2+ Peripheral Pulses, No clubbing, cyanosis, or edema  LYMPH: No lymphadenopathy noted  SKIN: No rashes or lesions    LABS:                        9.4    4.08  )-----------( 242      ( 16 Nov 2017 06:34 )             28.0     11-16    135  |  98  |  13  ----------------------------<  82  3.5   |  19<L>  |  0.95    Ca    8.6      16 Nov 2017 06:34  Phos  2.6     11-16  Mg     2.1     11-16    TPro  5.8<L>  /  Alb  2.7<L>  /  TBili  2.3<H>  /  DBili  x   /  AST  30  /  ALT  31  /  AlkPhos  315<H>  11-16              RADIOLOGY & ADDITIONAL TESTS:    CT A/P 11/15:  IMPRESSION:     Status post Whipple procedure with 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

## 2017-11-16 NOTE — PROGRESS NOTE ADULT - PROBLEM SELECTOR PLAN 2
Patient with Hgb drop from 11.6 on 11/11 to 9.4 this AM. Patient no longer having melanotic stools and no complaints of dizziness or light headedness, hypertensive but in s/o recent IR procedure through abdomen and persistent epigastric pain. GI aware.  -continue to assess patient at bedside for signs/symptoms of acute bleed  -monitor CBCs

## 2017-11-16 NOTE — PROVIDER CONTACT NOTE (OTHER) - BACKGROUND
Patient admitted with acute renal failure
Patient admitted with acute renal failure
pt admitted with acute renal failure and abdominal pain with RP mass.
pt admitted with acute renal failure and abdominal pain with RP mass.

## 2017-11-16 NOTE — PROGRESS NOTE ADULT - ASSESSMENT
[facility]  [address]  [facility] [address]  Phone: [phone]    Name:  Deya Rod  Current Date: 2017  : 1982  MRN: [id number]   YOLY: [id number]    Visit Date: 2017  Address: [address] [place] [id number]  Phone: [phone]    Primary Care Provider     Name: Kerry Suárez MD    Phone: [phone]    The staff of [facility] would like to thank you for allowing us to assist you with your healthcare needs. The following includes patient education materials and information on how best to care for your illness/injury at home and when to see a physician. If you need to locate a Doctor or clinic close to you, please call the Doctor Referral Service at [phone]. The Service is available Monday through Thursday from 8 AM to 8 PM and  from 8 AM to 4 PM.    Patients Please Note: If further time off is required, or a medical clearance to return to work is required, it must be obtained through your primary physician.  Return to work clearances and extensions of \"Time-Off\" will not be given by the Emergency Department.     We hope that you leave our Emergency Department believing that we provided you with very good care.   Your Opinion Matters To Us  If you receive a patient satisfaction survey in the mail, please complete and return it in the postage-paid envelope.  We truly value and appreciate your feedback.  Emergency Department Care Providers   Physician:Jean Almazan DO    Advanced Practice Provider:  Physician Assistant: Gisel Beck PA-C RN_________________ ED Tech__________________ Clerical_________________         72 M pmhx of pancreatic adenocarcinoma( 2015) s/p whipple/ s/p gemcitabine X 6 cycles, and 1 month of radiation( last tx with gemcitabine in 2016) and xeloda p/w abdominal pain x 2 weeks. Patient found to have E. coli bacteremia, thought transient 2/2 to IR biopsy. Note that patient had EUS on 11/7 to evaluate retroperitoneal mass but could not bx. Note elevated bilirubin on LFTs, ? due to procedure vs obstruction. Bacteremia due to cholangitis vs due to biopsy vs occult. Now with aeromonas in blood, also with unidentified GNR on 11/13. For now continue both beckie and levaquin due to unclear ID. 11/14 NGTD. Patient well appearing.  - Continue Meropenem 1g q 8  - Levaquin 750mg q 24  -  F/U GNR on 11/13 for ID and sensitivity  - ? 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

## 2017-11-17 VITALS
TEMPERATURE: 98 F | SYSTOLIC BLOOD PRESSURE: 116 MMHG | HEART RATE: 81 BPM | RESPIRATION RATE: 18 BRPM | DIASTOLIC BLOOD PRESSURE: 87 MMHG | OXYGEN SATURATION: 100 %

## 2017-11-17 DIAGNOSIS — K59.01 SLOW TRANSIT CONSTIPATION: ICD-10-CM

## 2017-11-17 DIAGNOSIS — R10.84 GENERALIZED ABDOMINAL PAIN: ICD-10-CM

## 2017-11-17 DIAGNOSIS — I82.90 ACUTE EMBOLISM AND THROMBOSIS OF UNSPECIFIED VEIN: ICD-10-CM

## 2017-11-17 DIAGNOSIS — Z51.5 ENCOUNTER FOR PALLIATIVE CARE: ICD-10-CM

## 2017-11-17 DIAGNOSIS — C25.9 MALIGNANT NEOPLASM OF PANCREAS, UNSPECIFIED: ICD-10-CM

## 2017-11-17 LAB
ALBUMIN SERPL ELPH-MCNC: 2.8 G/DL — LOW (ref 3.3–5)
ALP SERPL-CCNC: 348 U/L — HIGH (ref 40–120)
ALT FLD-CCNC: 35 U/L — SIGNIFICANT CHANGE UP (ref 4–41)
AST SERPL-CCNC: 42 U/L — HIGH (ref 4–40)
BASOPHILS # BLD AUTO: 0.03 K/UL — SIGNIFICANT CHANGE UP (ref 0–0.2)
BASOPHILS NFR BLD AUTO: 0.8 % — SIGNIFICANT CHANGE UP (ref 0–2)
BILIRUB SERPL-MCNC: 2.7 MG/DL — HIGH (ref 0.2–1.2)
BUN SERPL-MCNC: 11 MG/DL — SIGNIFICANT CHANGE UP (ref 7–23)
CALCIUM SERPL-MCNC: 8.7 MG/DL — SIGNIFICANT CHANGE UP (ref 8.4–10.5)
CHLORIDE SERPL-SCNC: 98 MMOL/L — SIGNIFICANT CHANGE UP (ref 98–107)
CO2 SERPL-SCNC: 21 MMOL/L — LOW (ref 22–31)
CREAT SERPL-MCNC: 1.04 MG/DL — SIGNIFICANT CHANGE UP (ref 0.5–1.3)
EOSINOPHIL # BLD AUTO: 0.13 K/UL — SIGNIFICANT CHANGE UP (ref 0–0.5)
EOSINOPHIL NFR BLD AUTO: 3.5 % — SIGNIFICANT CHANGE UP (ref 0–6)
GLUCOSE SERPL-MCNC: 81 MG/DL — SIGNIFICANT CHANGE UP (ref 70–99)
HCT VFR BLD CALC: 28.6 % — LOW (ref 39–50)
HGB BLD-MCNC: 9.6 G/DL — LOW (ref 13–17)
IMM GRANULOCYTES # BLD AUTO: 0.02 # — SIGNIFICANT CHANGE UP
IMM GRANULOCYTES NFR BLD AUTO: 0.5 % — SIGNIFICANT CHANGE UP (ref 0–1.5)
LYMPHOCYTES # BLD AUTO: 0.87 K/UL — LOW (ref 1–3.3)
LYMPHOCYTES # BLD AUTO: 23.4 % — SIGNIFICANT CHANGE UP (ref 13–44)
MAGNESIUM SERPL-MCNC: 1.9 MG/DL — SIGNIFICANT CHANGE UP (ref 1.6–2.6)
MCHC RBC-ENTMCNC: 29.2 PG — SIGNIFICANT CHANGE UP (ref 27–34)
MCHC RBC-ENTMCNC: 33.6 % — SIGNIFICANT CHANGE UP (ref 32–36)
MCV RBC AUTO: 86.9 FL — SIGNIFICANT CHANGE UP (ref 80–100)
MONOCYTES # BLD AUTO: 0.42 K/UL — SIGNIFICANT CHANGE UP (ref 0–0.9)
MONOCYTES NFR BLD AUTO: 11.3 % — SIGNIFICANT CHANGE UP (ref 2–14)
NEUTROPHILS # BLD AUTO: 2.25 K/UL — SIGNIFICANT CHANGE UP (ref 1.8–7.4)
NEUTROPHILS NFR BLD AUTO: 60.5 % — SIGNIFICANT CHANGE UP (ref 43–77)
NRBC # FLD: 0 — SIGNIFICANT CHANGE UP
PHOSPHATE SERPL-MCNC: 2.1 MG/DL — LOW (ref 2.5–4.5)
PLATELET # BLD AUTO: 243 K/UL — SIGNIFICANT CHANGE UP (ref 150–400)
PMV BLD: 10.2 FL — SIGNIFICANT CHANGE UP (ref 7–13)
POTASSIUM SERPL-MCNC: 4.1 MMOL/L — SIGNIFICANT CHANGE UP (ref 3.5–5.3)
POTASSIUM SERPL-SCNC: 4.1 MMOL/L — SIGNIFICANT CHANGE UP (ref 3.5–5.3)
PROT SERPL-MCNC: 6.2 G/DL — SIGNIFICANT CHANGE UP (ref 6–8.3)
RBC # BLD: 3.29 M/UL — LOW (ref 4.2–5.8)
RBC # FLD: 14.6 % — HIGH (ref 10.3–14.5)
SODIUM SERPL-SCNC: 133 MMOL/L — LOW (ref 135–145)
WBC # BLD: 3.72 K/UL — LOW (ref 3.8–10.5)
WBC # FLD AUTO: 3.72 K/UL — LOW (ref 3.8–10.5)

## 2017-11-17 PROCEDURE — 99223 1ST HOSP IP/OBS HIGH 75: CPT | Mod: GC

## 2017-11-17 PROCEDURE — 99239 HOSP IP/OBS DSCHRG MGMT >30: CPT

## 2017-11-17 PROCEDURE — 99232 SBSQ HOSP IP/OBS MODERATE 35: CPT

## 2017-11-17 RX ORDER — FERROUS SULFATE 325(65) MG
1 TABLET ORAL
Qty: 0 | Refills: 0 | COMMUNITY

## 2017-11-17 RX ORDER — TAMSULOSIN HYDROCHLORIDE 0.4 MG/1
1 CAPSULE ORAL
Qty: 0 | Refills: 0 | COMMUNITY

## 2017-11-17 RX ORDER — SENNA PLUS 8.6 MG/1
2 TABLET ORAL
Qty: 60 | Refills: 0 | OUTPATIENT
Start: 2017-11-17 | End: 2017-12-17

## 2017-11-17 RX ORDER — FINASTERIDE 5 MG/1
1 TABLET, FILM COATED ORAL
Qty: 14 | Refills: 0 | OUTPATIENT
Start: 2017-11-17 | End: 2017-12-01

## 2017-11-17 RX ORDER — ENOXAPARIN SODIUM 100 MG/ML
65 INJECTION SUBCUTANEOUS
Qty: 60 | Refills: 0 | OUTPATIENT
Start: 2017-11-17 | End: 2017-12-17

## 2017-11-17 RX ORDER — HYDROMORPHONE HYDROCHLORIDE 2 MG/ML
3 INJECTION INTRAMUSCULAR; INTRAVENOUS; SUBCUTANEOUS
Qty: 84 | Refills: 0 | OUTPATIENT
Start: 2017-11-17 | End: 2017-11-24

## 2017-11-17 RX ORDER — NYSTATIN 500MM UNIT
500000 POWDER (EA) MISCELLANEOUS EVERY 4 HOURS
Qty: 0 | Refills: 0 | Status: DISCONTINUED | OUTPATIENT
Start: 2017-11-17 | End: 2017-11-17

## 2017-11-17 RX ORDER — FINASTERIDE 5 MG/1
1 TABLET, FILM COATED ORAL
Qty: 0 | Refills: 0 | COMMUNITY

## 2017-11-17 RX ORDER — FERROUS SULFATE 325(65) MG
1 TABLET ORAL
Qty: 30 | Refills: 0 | OUTPATIENT
Start: 2017-11-17

## 2017-11-17 RX ORDER — NYSTATIN 500MM UNIT
5 POWDER (EA) MISCELLANEOUS
Qty: 1 | Refills: 0 | OUTPATIENT
Start: 2017-11-17 | End: 2017-11-24

## 2017-11-17 RX ORDER — DOCUSATE SODIUM 100 MG
1 CAPSULE ORAL
Qty: 30 | Refills: 0 | OUTPATIENT
Start: 2017-11-17

## 2017-11-17 RX ORDER — HYDROMORPHONE HYDROCHLORIDE 2 MG/ML
1 INJECTION INTRAMUSCULAR; INTRAVENOUS; SUBCUTANEOUS
Qty: 56 | Refills: 0 | OUTPATIENT
Start: 2017-11-17 | End: 2017-11-24

## 2017-11-17 RX ORDER — SIMETHICONE 80 MG/1
1 TABLET, CHEWABLE ORAL
Qty: 56 | Refills: 0 | OUTPATIENT
Start: 2017-11-17 | End: 2017-12-01

## 2017-11-17 RX ORDER — TAMSULOSIN HYDROCHLORIDE 0.4 MG/1
1 CAPSULE ORAL
Qty: 14 | Refills: 0 | OUTPATIENT
Start: 2017-11-17 | End: 2017-12-01

## 2017-11-17 RX ORDER — DOCUSATE SODIUM 100 MG
1 CAPSULE ORAL
Qty: 0 | Refills: 0 | COMMUNITY

## 2017-11-17 RX ORDER — SODIUM,POTASSIUM PHOSPHATES 278-250MG
1 POWDER IN PACKET (EA) ORAL ONCE
Qty: 0 | Refills: 0 | Status: DISCONTINUED | OUTPATIENT
Start: 2017-11-17 | End: 2017-11-17

## 2017-11-17 RX ADMIN — HYDROMORPHONE HYDROCHLORIDE 6 MILLIGRAM(S): 2 INJECTION INTRAMUSCULAR; INTRAVENOUS; SUBCUTANEOUS at 07:34

## 2017-11-17 RX ADMIN — Medication 100 MILLIGRAM(S): at 13:17

## 2017-11-17 RX ADMIN — HYDROMORPHONE HYDROCHLORIDE 6 MILLIGRAM(S): 2 INJECTION INTRAMUSCULAR; INTRAVENOUS; SUBCUTANEOUS at 13:18

## 2017-11-17 RX ADMIN — HYDROMORPHONE HYDROCHLORIDE 6 MILLIGRAM(S): 2 INJECTION INTRAMUSCULAR; INTRAVENOUS; SUBCUTANEOUS at 13:48

## 2017-11-17 RX ADMIN — FINASTERIDE 5 MILLIGRAM(S): 5 TABLET, FILM COATED ORAL at 13:17

## 2017-11-17 RX ADMIN — Medication 500000 UNIT(S): at 13:19

## 2017-11-17 RX ADMIN — ENOXAPARIN SODIUM 65 MILLIGRAM(S): 100 INJECTION SUBCUTANEOUS at 17:00

## 2017-11-17 RX ADMIN — MEROPENEM 100 MILLIGRAM(S): 1 INJECTION INTRAVENOUS at 06:33

## 2017-11-17 RX ADMIN — Medication 100 MILLIGRAM(S): at 06:33

## 2017-11-17 RX ADMIN — ENOXAPARIN SODIUM 65 MILLIGRAM(S): 100 INJECTION SUBCUTANEOUS at 06:33

## 2017-11-17 RX ADMIN — HYDROMORPHONE HYDROCHLORIDE 6 MILLIGRAM(S): 2 INJECTION INTRAMUSCULAR; INTRAVENOUS; SUBCUTANEOUS at 06:34

## 2017-11-17 RX ADMIN — HYDROMORPHONE HYDROCHLORIDE 6 MILLIGRAM(S): 2 INJECTION INTRAMUSCULAR; INTRAVENOUS; SUBCUTANEOUS at 00:02

## 2017-11-17 RX ADMIN — Medication 325 MILLIGRAM(S): at 13:17

## 2017-11-17 NOTE — PROGRESS NOTE ADULT - PROBLEM SELECTOR PLAN 3
Thought to be most likely 2/2 to prerenal  -resolved but slight uptrend in crt today to 1.04  -cont to monitor Patient with Hgb drop from 11.6 on 11/11 to 9.4 this AM. Patient no longer having melanotic stools and no complaints of dizziness or light headedness, hypertensive but in s/o recent IR procedure through abdomen and persistent epigastric pain. GI aware.  -continue to assess patient at bedside for signs/symptoms of acute bleed  -monitor CBCs

## 2017-11-17 NOTE — CONSULT NOTE ADULT - CONSULT REQUESTED DATE/TIME
02-Nov-2017 16:51
03-Nov-2017 09:20
03-Nov-2017 15:56
15-Nov-2017
14-Nov-2017 10:41
17-Nov-2017 13:27

## 2017-11-17 NOTE — PROGRESS NOTE ADULT - PROVIDER SPECIALTY LIST ADULT
Gastroenterology
Gastroenterology
Infectious Disease
Internal Medicine
Urology
Internal Medicine

## 2017-11-17 NOTE — PROGRESS NOTE ADULT - PROBLEM SELECTOR PLAN 5
Patient with no pain this morning - well-controlled on dilaudid PO. CT A/P showing IVC tumor thrombus.  -surgery report no surgical intervention necessary at this time  - will cont to monitor  - Tylenol PRN for mild to moderate pain  - c/w Dilaudid 6mg PO q6h   - cont bowel regimen and monitor

## 2017-11-17 NOTE — CHART NOTE - NSCHARTNOTEFT_GEN_A_CORE
Source: Patient [ X ]    Family [ X ]     other [ X ] RN    Patient seen for length of stay and malnutrition follow-up. Patient is Liberian-speaking only, prefers daughter at bedside to provide translation. According to daughter, PO intake still suboptimal predominately due to loss of appetite. Patient is accepting Ensure Enlive supplements but with reservations as it gives him diarrhea sometimes. He does like Ensure Clear better which he has had in the past. Plan for d/c later this afternoon. Strategies to optimize calorie/protein intake to promote weight gains/deter further weight loss reviewed. Written instructions provided to daughter in both English and Liberian.     CURRENT DIET : Regular; Ensure Enlive 240mls 3x daily (1050kcal, 60g protein).       _______WEIGHTS:________  no new weight available. Daughter unsure   Patient reports UBW of 160# with 20-25# weight loss x 3 weeks PTA   Dosing weight since admission (11/7) 66.3kg/ 146.1#   Unable to provide with current weight     Nutrition focused physical exam conducted - found signs of malnutrition [ ]absent [X]present   Subcutaneous fat loss: [moderate]Orbital fat pads region,[severe]Buccal fat region,[moderate]Triceps region; Muscle wasting: [severe]Temples region, [severe]Clavicle region, [severe]Shoulder region    EDEMA: none  SKIN: intact of pressure injuries           _______PERTINENT MEDICATIONS:________     docusate sodium  ferrous    sulfate  finasteride  polyethylene glycol 3350  potassium acid phosphate/sodium acid phosphate tablet (K-PHOS No. 2)  senna  sodium chloride 0.9%.  tamsulosin      _______PERTINENT LABS:_________    Sodium, Serum: 133 <L> (11-17 @ 04:50)  Potassium, Serum: 4.1 (11-17 @ 04:50)  Glucose, Serum: 81 (11-17 @ 04:50)  Blood Urea Nitrogen, Serum: 11 (11-17 @ 04:50)  Phosphorus Level, Serum: 2.1 <L> (11-17 @ 04:50)  Albumin, Serum: 2.8 <L> (11-17 @ 04:50)  Aspartate Aminotransferase (AST/SGOT): 42 <H> (11-17 @ 04:50)  Alanine Aminotransferase (ALT/SGPT): 35 (11-17 @ 04:50)  Magnesium, Serum: 1.9 (11-17 @ 04:50)        Estimated Needs:   [ X ] no change since previous assessment  [ ] recalculated:       Previous Nutrition Diagnosis:   [ X ] Malnutrition, Severe  Nutrition Diagnosis is [ X ] ongoing  [ ] resolved [ ] not applicable            ___________________ADDITIONAL RECOMMENDATIONS___________________    1) Continue Regular diet and Ensure Enlive 240mls 3x daily (1050kcal, 60g protein) as tolerated   2)  Suggest outpatient follow up with appropriate RD for purposes of long-term nutrition evaluation. Consider appetite stimulant if medically appropriate      Karis Poe, BONNIE, CDN, CDE (Pager [phone])

## 2017-11-17 NOTE — PROGRESS NOTE ADULT - PROBLEM SELECTOR PLAN 4
Right RP mass with multiple arterially enhancing lesions at the hepatic dome found on CT A/P concerning for recurrence of pancreatic CA vs. new malignancy  - CEA noted to be elevated to 6.3 in June  - RP mass biopsied by IR 11/9- preliminary results c/f recurrent pancreatic cancer

## 2017-11-17 NOTE — CONSULT NOTE ADULT - PROBLEM SELECTOR RECOMMENDATION 3
catherine packer  explained to family and pt that he needs to take it as pain meds will constipate hime

## 2017-11-17 NOTE — PROGRESS NOTE ADULT - PROBLEM SELECTOR PLAN 2
Patient with Hgb drop from 11.6 on 11/11 to 9.4 this AM. Patient no longer having melanotic stools and no complaints of dizziness or light headedness, hypertensive but in s/o recent IR procedure through abdomen and persistent epigastric pain. GI aware.  -continue to assess patient at bedside for signs/symptoms of acute bleed  -monitor CBCs Pt found to have tumor thrombus in addition to bland thrombus in IVC.  Pt started on therapeutic Lovenox and daughter is in agreement with twice daily injections.  Nurse to instruct pt and daughter on how to inject.

## 2017-11-17 NOTE — PROGRESS NOTE ADULT - ASSESSMENT
72 M pmhx of pancreatic adenocarcinoma( 2015) s/p whipple/ s/p gemcitabine X 6 cycles, and 1 month of radiation( last tx with gemcitabine in 2016) and xeloda p/w abdominal pain x 2 weeks. Patient found to have E. coli bacteremia, thought transient 2/2 to IR biopsy. Note that patient had EUS on 11/7 to evaluate retroperitoneal mass but could not bx. Note elevated bilirubin on LFTs, ? due to procedure vs obstruction. Bacteremia due to cholangitis vs due to biopsy vs occult. Aeromonas in BCX on 11/12, 11/13. BCX clear as of 11/14. Overall appears clinically well. All bacteria isolated S to FQ.  - Levaquin 750mg q 24 through 11/27/17 to complete two weeks  - DC Reuben  - Management for malignancy/tumor invasion as per primary team    Jens Livingston MD  Pager [phone]  After 5pm and on weekends call [phone]

## 2017-11-17 NOTE — PROGRESS NOTE ADULT - SUBJECTIVE AND OBJECTIVE BOX
Patient is a 72y old  Male who presents with a chief complaint of Came for complaints of Epigastric Pain radiating from the right side Abdomen to the left side, no nausea or vomiting (05 Nov 2017 09:51)      SUBJECTIVE / OVERNIGHT EVENTS: No acute events overnight. Patient feels well this morning with no abdominal pain. Last BM was last night and was brown/green and well-formed. No complaints of dizziness, light headedness, CP, palps, diaphoresis, f/c, n/v/d/c.      MEDICATIONS  (STANDING):  docusate sodium 100 milliGRAM(s) Oral three times a day  enoxaparin Injectable 65 milliGRAM(s) SubCutaneous two times a day  ferrous    sulfate 325 milliGRAM(s) Oral daily  finasteride 5 milliGRAM(s) Oral daily  HYDROmorphone   Tablet 6 milliGRAM(s) Oral every 6 hours  levoFLOXacin IVPB      levoFLOXacin IVPB 750 milliGRAM(s) IV Intermittent every 24 hours  meropenem IVPB 1000 milliGRAM(s) IV Intermittent every 8 hours  polyethylene glycol 3350 17 Gram(s) Oral two times a day  senna 2 Tablet(s) Oral at bedtime  sodium chloride 0.9%. 1000 milliLiter(s) (75 mL/Hr) IV Continuous <Continuous>  tamsulosin 0.4 milliGRAM(s) Oral at bedtime    MEDICATIONS  (PRN):  acetaminophen   Tablet 650 milliGRAM(s) Oral every 6 hours PRN For Temp greater than 38 C (100.4 F)  acetaminophen   Tablet. 650 milliGRAM(s) Oral every 6 hours PRN Mild to moderate pain 1-5  HYDROmorphone   Tablet 2 milliGRAM(s) Oral every 3 hours PRN Breakthrough pain  simethicone 80 milliGRAM(s) Chew every 6 hours PRN Dyspepsia        CAPILLARY BLOOD GLUCOSE        I&O's Summary    16 Nov 2017 07:01  -  17 Nov 2017 07:00  --------------------------------------------------------  IN: 0 mL / OUT: 600 mL / NET: -600 mL        PHYSICAL EXAM:  VS: T 98.5, HR 79, /96, RR 20, SpO2 100% on RA  GENERAL: NAD  HEAD:  Atraumatic, Normocephalic  EYES: EOMI, PERRLA, conjunctiva and sclera clear  ENMT: No tonsillar erythema, exudates, or enlargement; Moist mucous membranes, Good dentition, No lesions  NECK: Supple, No JVD  NERVOUS SYSTEM:  Alert & Oriented X3, Good concentration; Motor Strength 5/5 B/L upper and lower extremities  CHEST/LUNG: Clear to auscultation bilaterally; No rales, rhonchi, wheezing, or rubs  HEART: Regular rate and rhythm; No murmurs, rubs, or gallops  ABDOMEN: Surgery site with dressing that is c/d/i - no discharge, no tenderness or surrounding erythema. Bowel Sounds present, soft, abdomen nondistended, nontender in epigastric area, No CVA tenderness or flank pain. Chevron scar well healed, nondistended, no guarding, no rebound  EXTREMITIES:  2+ Peripheral Pulses, No clubbing, cyanosis, or edema  LYMPH: No lymphadenopathy noted  SKIN: No rashes or lesions    LABS:                        9.6    3.72  )-----------( 243      ( 17 Nov 2017 04:50 )             28.6     11-17    133<L>  |  98  |  11  ----------------------------<  81  4.1   |  21<L>  |  1.04    Ca    8.7      17 Nov 2017 04:50  Phos  2.1     11-17  Mg     1.9     11-17    TPro  6.2  /  Alb  2.8<L>  /  TBili  2.7<H>  /  DBili  x   /  AST  42<H>  /  ALT  35  /  AlkPhos  348<H>  11-17              RADIOLOGY & ADDITIONAL TESTS:    No new additional studies.

## 2017-11-17 NOTE — CONSULT NOTE ADULT - SUBJECTIVE AND OBJECTIVE BOX
HPI:  72M pmhx of pancreatic adenocarcinoma( 2015) s/p whipple/ s/p gemcitabine X 6 cycles, and 1 month of radiation( last tx with gemcitabine in 2016) and xeloda p/w abdominal pain x 2 weeks. Patient is Senegalese only speaking,  services offered and declined, daughter at bedside provides translation. Patient states the abdominal pain is cramping in nature, begins in the epigastrium migrates to RUQ, starts at 1-2 worsens to 6/10 with regards to pain. Patient denies any diarrhea, states he is constipatied last 3 days, states when he goes its small round balls. States he had one episode of fever last week (38 celsius) x once. Denies vomiting, diarrhea, chest pain, dyspnea, melena, hematochezia. Recent travel to the [place]. No sick contacts    VS in the ED: T: 97.8, HR:61-65, BP: 145//90, RR: 14 100% on RA    Labs personally reviewed:  CBC significant for normocytic anemia Hgb of 11.8 ( baseline 12). CMP significant for an HILTON Cr of 1.66 ( baseline Cr close to 1), Alk phos of 243. Bicarb 29, AG 6. VBG w/o lactate, Ph of 7.36 with pCO2 of 56     CT A/P: Status post Whipple procedure with no significant change in appearance of the pancreas.  Interval progression of soft tissue recurrence in the retroperitoneum with progression in moderate right hydronephrosis.  Suggestion of multiple arterially enhancing lesions at the hepatic dome.  There is progression of moderate right hydronephrosis secondary to the retroperitoneal mass,   located between the duodenum and IVC, which has progressed since the PET/CT ( Aug 2017), now measuring 2.1 x 2 cm  A 3.5 x 3.3 cm infrarenal aortic aneurysm (02 Nov 2017 17:06)    Asked to see pt for pain.  Pt sitting in chair.  Denies pain.        PERTINENT PMH REVIEWED:  [ x] YES [ ] NO           SOCIAL HISTORY:  Significant other/partner:  [ x] YES  [ ] NO            Children:  [x ] YES  [ ] NO                   Christian/Spirituality:  Substance hx:  [ ] YES   [x ] NO           Tobacco hx:  [ ] YES  [ x] NO             Alcohol hx: [ ] YES  [x ] NO        Home Opioid hx:  [ ] YES  [ x] NO   Living Situation: [x ] Home  [ ] Long term care  [ ] Rehab    FAMILY HISTORY:  Family history of heart disease (Father)    [x ] Family history non contributory     BASELINE ADLs (prior to admission):  Independent [ ] moderately [x ] fully   Dependent   [ ] moderately [ ] fully    Code Status:                      MOLST  [ ] YES [ ] NO    Living Will  [ ] YES [ ] NO    Health Care Proxy [ ] YES  [ ] NO      [ ] Surrogate  [ ] HCP  [ ] Guardian:       see emr                                                           Phone#:    Allergies    No Known Allergies    Intolerances        MEDICATIONS  (STANDING):  docusate sodium 100 milliGRAM(s) Oral three times a day  enoxaparin Injectable 65 milliGRAM(s) SubCutaneous two times a day  ferrous    sulfate 325 milliGRAM(s) Oral daily  finasteride 5 milliGRAM(s) Oral daily  HYDROmorphone   Tablet 6 milliGRAM(s) Oral every 6 hours  levoFLOXacin IVPB      levoFLOXacin IVPB 750 milliGRAM(s) IV Intermittent every 24 hours  nystatin    Suspension 350489 Unit(s) Oral every 4 hours  polyethylene glycol 3350 17 Gram(s) Oral two times a day  potassium acid phosphate/sodium acid phosphate tablet (K-PHOS No. 2) 1 Tablet(s) Oral once  senna 2 Tablet(s) Oral at bedtime  sodium chloride 0.9%. 1000 milliLiter(s) (75 mL/Hr) IV Continuous <Continuous>  tamsulosin 0.4 milliGRAM(s) Oral at bedtime    MEDICATIONS  (PRN):  acetaminophen   Tablet 650 milliGRAM(s) Oral every 6 hours PRN For Temp greater than 38 C (100.4 F)  acetaminophen   Tablet. 650 milliGRAM(s) Oral every 6 hours PRN Mild to moderate pain 1-5  HYDROmorphone   Tablet 2 milliGRAM(s) Oral every 3 hours PRN Breakthrough pain  simethicone 80 milliGRAM(s) Chew every 6 hours PRN Dyspepsia      PRESENT SYMPTOMS:  Source: [ x] Patient   [ ] Family   [ ] Team     Pain: [ ] YES [x ] NO  Onset:                    Location:                          Duration:                     Aggravating factors:                        Relieving factors:    Radiation:              Timing:                             Severity:                      Character:    Dyspnea: [ ] YES [x ] NO - Mild [ ]  Moderate [ ]  Severe [ ]    Anxiety: [ ] YES [x ] NO  Fatigue: [ ] YES [x ] NO   Nausea: [ ] YES [ x] NO  Loss of appetite: [ ] YES [x ] NO   Constipation: [ ] YES [x ] NO     Other Symptoms:  [x ] All other review of systems negative   [ ] Unable to obtain due to poor mentation     Does patient meet criteria for Severe Protein Calorie Malnutrition?  Yes [ ]  No [x ]  PPSV 30% or below [ ]  Anasarca [ ]  Albumin < 2 [ ] Catabolic State [ ] Poor nutritional intake [ ] Significant weight loss [ ]      Palliative Performance Status Version 2:      60   %  ECOG -        Vital Signs Last 24 Hrs  T(C): 36.9 (17 Nov 2017 06:29), Max: 37.2 (17 Nov 2017 01:15)  T(F): 98.5 (17 Nov 2017 06:29), Max: 99 (17 Nov 2017 01:15)  HR: 79 (17 Nov 2017 06:29) (77 - 85)  BP: 140/96 (17 Nov 2017 06:29) (138/94 - 145/98)  BP(mean): --  RR: 20 (17 Nov 2017 06:29) (18 - 20)  SpO2: 100% (17 Nov 2017 06:29) (99% - 100%)    Physical Exam:    General: [x ] Alert,  A&O x   3  [ ] lethargic   [ ] Agitated   [ ] Cachexia   HEENT: [ ] Normal   [ ] Dry mouth   [ ] ET Tube    [ ] Trach +oral thrush  Lungs: [x ] Clear [ ] Rhonchi  [ ] Crackles [ ] Wheezing [ ] Tachypnea  [ ] Audible excessive secretions    Cardiovascular:  [x ] Regular rate and rhythm  [ ] Irregular [ ] Tachycardia   [ ] Bradycardia   Abdomen: [ x] Soft  [ ] Distended  [ ] +BS  [ ] Non tender [ ] Tender  [ ]PEG   [ ] NGT   Last BM:     Genitourinary: [x ] Normal [ ] Incontinent   [ ] Oliguria/Anuria   [ ] Ludwig  Musculoskeletal:  [x ] Normal   [ ] Generalized weakness  [ ] Bedbound  [ ] Edema   Neurological: [x ] No focal deficits  [ ] Cognitive impairment     Skin: [ ] Normal   [ ] Pressure ulcers +jaundice    LABS:                        9.6    3.72  )-----------( 243      ( 17 Nov 2017 04:50 )             28.6     11-17    133<L>  |  98  |  11  ----------------------------<  81  4.1   |  21<L>  |  1.04    Ca    8.7      17 Nov 2017 04:50  Phos  2.1     11-17  Mg     1.9     11-17    TPro  6.2  /  Alb  2.8<L>  /  TBili  2.7<H>  /  DBili  x   /  AST  42<H>  /  ALT  35  /  AlkPhos  348<H>  11-17        I&O's Summary    16 Nov 2017 07:01  -  17 Nov 2017 07:00  --------------------------------------------------------  IN: 0 mL / OUT: 600 mL / NET: -600 mL        RADIOLOGY & ADDITIONAL STUDIES:    Referrals:  Hospice [ ]   Chaplaincy [ ]    Child Life [ ]   Social Work [ ]   Case Management [ ]   Holistic Therapy [ ]

## 2017-11-17 NOTE — PROGRESS NOTE ADULT - PROBLEM SELECTOR PROBLEM 9
Prophylactic measure
AAA (abdominal aortic aneurysm) without rupture
Prophylactic measure
Prophylactic measure
AAA (abdominal aortic aneurysm) without rupture
AAA (abdominal aortic aneurysm) without rupture
Prophylactic measure
AAA (abdominal aortic aneurysm) without rupture

## 2017-11-17 NOTE — PROGRESS NOTE ADULT - ATTENDING COMMENTS
Pt seen and examined, chart and labs reviewed.  Care discussed extensively with patient's daughter and patient at bedside.  Surgical oncology team unable to offer any operative intervention and plan is for the patient to followup with Dr. Jain at [facility] (he already has appt for 11/25).  I explained to daughter that there will likely be no curative treatment options available, but palliative chemotherapy may be an option as long as he remains functional.  She is also aware of presence of IVC clot, for which he will require twice daily Lovenox injections.  Pt to complete PO Levaquin course at home for total 2 week course.  Pt is medically stable for d/c home today.     D/C time 40 minutes

## 2017-11-17 NOTE — PROGRESS NOTE ADULT - SUBJECTIVE AND OBJECTIVE BOX
CC: F/U for Bacteremia    Saw/spoke to patient. Daughter at bedside. Overall appears well, with no new complaints noted.    Allergies  No Known Allergies    ANTIMICROBIALS:  levoFLOXacin IVPB    levoFLOXacin IVPB 750 every 24 hours  nystatin    Suspension 699433 every 4 hours    PE:    Vital Signs Last 24 Hrs  T(C): 36.9 (17 Nov 2017 06:29), Max: 37.2 (17 Nov 2017 01:15)  T(F): 98.5 (17 Nov 2017 06:29), Max: 99 (17 Nov 2017 01:15)  HR: 79 (17 Nov 2017 06:29) (77 - 85)  BP: 140/96 (17 Nov 2017 06:29) (138/94 - 145/98)  BP(mean): --  RR: 20 (17 Nov 2017 06:29) (18 - 20)  SpO2: 100% (17 Nov 2017 06:29) (99% - 100%)    Gen: AOx3, NAD, non-toxic, pleasant  CV: S1+S2 normal, no murmurs, nontachycardic  Resp: Clear bilat, no resp distress, no crackles/wheezes  Abd: Soft, nontender, +BS  Ext: No LE edema, no wounds    LABS:                        9.6    3.72  )-----------( 243      ( 17 Nov 2017 04:50 )             28.6     11-17    133<L>  |  98  |  11  ----------------------------<  81  4.1   |  21<L>  |  1.04    Ca    8.7      17 Nov 2017 04:50  Phos  2.1     11-17  Mg     1.9     11-17    TPro  6.2  /  Alb  2.8<L>  /  TBili  2.7<H>  /  DBili  x   /  AST  42<H>  /  ALT  35  /  AlkPhos  348<H>  11-17    MICROBIOLOGY:    BLOOD PERIPHERAL  11-14-17 NGTD    BLOOD PERIPHERAL  11-13-17 --  --  Aeromonas hydrophila grp  S FQ    BLOOD PERIPHERAL  11-12-17 --  --  Aeromonas hydrophila grp  S FQ    URINE MIDSTREAM  11-11-17 --  --  Klebsiella pneumoniae S FQ    BLOOD  11-10-17 --  --  BLOOD CULTURE PCR S FQ  Escherichia coli    RADIOLOGY:    No new available

## 2017-11-17 NOTE — CONSULT NOTE ADULT - PROBLEM SELECTOR RECOMMENDATION 9
pt s/p whippervin   will follow up with onc as outpatient
-HILTON likely 2/2 decreased PO intake.  Recommend IV hydration and recheck BMP.  -Patient has no flank pain, denies fevers, chills, nausea, or vomiting.    -Stent placement not currently indicated in asymptomatic patient.  Recommend general surgery/oncology evaluation of recurrent mass.  -If patient becomes symptomatic, can consider stent vs NT.  -Please page urology immediately at #[phone] for any fever > 100.4, as patient will need urgent decompression.
History and imaging reviewed.  Known history of ampulla of vater adenocarcinoma, hepatobiliary type s/p whipple, chemo RT.  Per allscripts concern for enlarging RP mass in summer 2017, underwent PET CT which revealed a hypermetabolic retroperitoneal nodule compatible with recurrent disease. Unfortunately, Mr. Ryder had left the country and did not follow up with Dr. Jain.  Imaging on this admission now revealing of enlarging RP mass with moderate right sided hydronephrosis which required ureteral stenting.      At this time will need to pursue tissue diagnosis to confirm disease recurrence.  Mass likely accessible by US guided biopsy. Additional treatment recommendations pending pathology.  Continue care per primary team.

## 2017-11-19 LAB — BACTERIA BLD CULT: SIGNIFICANT CHANGE UP

## 2017-11-27 ENCOUNTER — APPOINTMENT (OUTPATIENT)
Dept: HEMATOLOGY ONCOLOGY | Facility: CLINIC | Age: 72
End: 2017-11-27

## 2017-11-27 DIAGNOSIS — R50.9 FEVER, UNSPECIFIED: ICD-10-CM

## 2017-11-27 RX ORDER — HYDROMORPHONE HYDROCHLORIDE 2 MG/1
2 TABLET ORAL
Qty: 96 | Refills: 0 | Status: ACTIVE | COMMUNITY
Start: 2017-11-27 | End: 1900-01-01

## 2017-11-27 RX ORDER — METHADONE HYDROCHLORIDE 5 MG/1
5 TABLET ORAL
Qty: 56 | Refills: 0 | Status: ACTIVE | COMMUNITY
Start: 2017-11-27 | End: 1900-01-01

## 2017-11-28 ENCOUNTER — INPATIENT (INPATIENT)
Facility: HOSPITAL | Age: 72
LOS: 3 days | Discharge: HOSPICE | End: 2017-12-02
Attending: INTERNAL MEDICINE | Admitting: INTERNAL MEDICINE
Payer: MEDICAID

## 2017-11-28 VITALS
DIASTOLIC BLOOD PRESSURE: 66 MMHG | HEART RATE: 96 BPM | SYSTOLIC BLOOD PRESSURE: 97 MMHG | OXYGEN SATURATION: 98 % | RESPIRATION RATE: 20 BRPM | TEMPERATURE: 100 F

## 2017-11-28 DIAGNOSIS — C25.9 MALIGNANT NEOPLASM OF PANCREAS, UNSPECIFIED: Chronic | ICD-10-CM

## 2017-11-28 LAB
ALBUMIN SERPL ELPH-MCNC: 2.5 G/DL — LOW (ref 3.3–5)
ALP SERPL-CCNC: 701 U/L — HIGH (ref 40–120)
ALT FLD-CCNC: 82 U/L — HIGH (ref 4–41)
APPEARANCE UR: SIGNIFICANT CHANGE UP
AST SERPL-CCNC: 127 U/L — HIGH (ref 4–40)
BACTERIA # UR AUTO: SIGNIFICANT CHANGE UP
BASE EXCESS BLDV CALC-SCNC: 0.2 MMOL/L — SIGNIFICANT CHANGE UP
BASOPHILS NFR BLD AUTO: 0.1 % — SIGNIFICANT CHANGE UP (ref 0–2)
BILIRUB SERPL-MCNC: 9.4 MG/DL — HIGH (ref 0.2–1.2)
BILIRUB UR-MCNC: SIGNIFICANT CHANGE UP
BLD GP AB SCN SERPL QL: NEGATIVE — SIGNIFICANT CHANGE UP
BLOOD GAS VENOUS - CREATININE: 1.1 MG/DL — SIGNIFICANT CHANGE UP (ref 0.5–1.3)
BLOOD UR QL VISUAL: HIGH
BUN SERPL-MCNC: 25 MG/DL — HIGH (ref 7–23)
CALCIUM SERPL-MCNC: 8 MG/DL — LOW (ref 8.4–10.5)
CHLORIDE BLDV-SCNC: 95 MMOL/L — LOW (ref 96–108)
CHLORIDE SERPL-SCNC: 93 MMOL/L — LOW (ref 98–107)
CO2 SERPL-SCNC: 21 MMOL/L — LOW (ref 22–31)
COLOR SPEC: YELLOW — SIGNIFICANT CHANGE UP
CREAT SERPL-MCNC: 0.96 MG/DL — SIGNIFICANT CHANGE UP (ref 0.5–1.3)
EOSINOPHIL NFR BLD AUTO: 0.8 % — SIGNIFICANT CHANGE UP (ref 0–6)
GAS PNL BLDV: 126 MMOL/L — LOW (ref 136–146)
GLUCOSE BLDV-MCNC: 102 — HIGH (ref 70–99)
GLUCOSE SERPL-MCNC: 101 MG/DL — HIGH (ref 70–99)
GLUCOSE UR-MCNC: NEGATIVE — SIGNIFICANT CHANGE UP
HCO3 BLDV-SCNC: 24 MMOL/L — SIGNIFICANT CHANGE UP (ref 20–27)
HCT VFR BLD CALC: 19.9 % — CRITICAL LOW (ref 39–50)
HCT VFR BLDV CALC: 22 % — LOW (ref 39–51)
HGB BLD-MCNC: 6.9 G/DL — CRITICAL LOW (ref 13–17)
HGB BLDV-MCNC: 7 G/DL — CRITICAL LOW (ref 13–17)
IMM GRANULOCYTES NFR BLD AUTO: 0.7 % — SIGNIFICANT CHANGE UP (ref 0–1.5)
KETONES UR-MCNC: NEGATIVE — SIGNIFICANT CHANGE UP
LACTATE BLDV-MCNC: 0.9 MMOL/L — SIGNIFICANT CHANGE UP (ref 0.5–2)
LDH SERPL L TO P-CCNC: 190 U/L — SIGNIFICANT CHANGE UP (ref 135–225)
LEUKOCYTE ESTERASE UR-ACNC: HIGH
LIDOCAIN IGE QN: 69.6 U/L — HIGH (ref 7–60)
LYMPHOCYTES # BLD AUTO: 5.5 % — LOW (ref 13–44)
MCHC RBC-ENTMCNC: 29.1 PG — SIGNIFICANT CHANGE UP (ref 27–34)
MCHC RBC-ENTMCNC: 34.7 % — SIGNIFICANT CHANGE UP (ref 32–36)
MCV RBC AUTO: 84 FL — SIGNIFICANT CHANGE UP (ref 80–100)
MONOCYTES NFR BLD AUTO: 7.8 % — SIGNIFICANT CHANGE UP (ref 2–14)
MUCOUS THREADS # UR AUTO: SIGNIFICANT CHANGE UP
NEUTROPHILS NFR BLD AUTO: 85.1 % — HIGH (ref 43–77)
NITRITE UR-MCNC: NEGATIVE — SIGNIFICANT CHANGE UP
NON-SQ EPI CELLS # UR AUTO: 3 — SIGNIFICANT CHANGE UP
OB PNL STL: NEGATIVE — SIGNIFICANT CHANGE UP
PCO2 BLDV: 39 MMHG — LOW (ref 41–51)
PH BLDV: 7.41 PH — SIGNIFICANT CHANGE UP (ref 7.32–7.43)
PH UR: 6 — SIGNIFICANT CHANGE UP (ref 4.6–8)
PLATELET # BLD AUTO: 418 K/UL — HIGH (ref 150–400)
PMV BLD: 10 FL — SIGNIFICANT CHANGE UP (ref 7–13)
PO2 BLDV: 44 MMHG — HIGH (ref 35–40)
POTASSIUM BLDV-SCNC: 4 MMOL/L — SIGNIFICANT CHANGE UP (ref 3.4–4.5)
POTASSIUM SERPL-MCNC: 4.2 MMOL/L — SIGNIFICANT CHANGE UP (ref 3.5–5.3)
POTASSIUM SERPL-SCNC: 4.2 MMOL/L — SIGNIFICANT CHANGE UP (ref 3.5–5.3)
PROT SERPL-MCNC: 5.8 G/DL — LOW (ref 6–8.3)
PROT UR-MCNC: 100 — SIGNIFICANT CHANGE UP
RBC # BLD: 2.37 M/UL — LOW (ref 4.2–5.8)
RBC # FLD: 16.9 % — HIGH (ref 10.3–14.5)
RBC CASTS # UR COMP ASSIST: HIGH (ref 0–?)
RH IG SCN BLD-IMP: POSITIVE — SIGNIFICANT CHANGE UP
SAO2 % BLDV: 76.1 % — SIGNIFICANT CHANGE UP (ref 60–85)
SODIUM SERPL-SCNC: 129 MMOL/L — LOW (ref 135–145)
SP GR SPEC: 1.02 — SIGNIFICANT CHANGE UP (ref 1–1.03)
SQUAMOUS # UR AUTO: SIGNIFICANT CHANGE UP
URATE SERPL-MCNC: 3.1 MG/DL — LOW (ref 3.4–8.8)
UROBILINOGEN FLD QL: 4 E.U. — HIGH (ref 0.1–0.2)
WBC # BLD: 8.61 K/UL — SIGNIFICANT CHANGE UP (ref 3.8–10.5)
WBC # FLD AUTO: 8.61 K/UL — SIGNIFICANT CHANGE UP (ref 3.8–10.5)
WBC UR QL: HIGH (ref 0–?)

## 2017-11-28 PROCEDURE — 74177 CT ABD & PELVIS W/CONTRAST: CPT | Mod: 26

## 2017-11-28 PROCEDURE — 71010: CPT | Mod: 26

## 2017-11-28 PROCEDURE — 76700 US EXAM ABDOM COMPLETE: CPT | Mod: 26

## 2017-11-28 RX ORDER — PIPERACILLIN AND TAZOBACTAM 4; .5 G/20ML; G/20ML
3.38 INJECTION, POWDER, LYOPHILIZED, FOR SOLUTION INTRAVENOUS ONCE
Qty: 0 | Refills: 0 | Status: COMPLETED | OUTPATIENT
Start: 2017-11-28 | End: 2017-11-28

## 2017-11-28 RX ORDER — VANCOMYCIN HCL 1 G
1000 VIAL (EA) INTRAVENOUS ONCE
Qty: 0 | Refills: 0 | Status: COMPLETED | OUTPATIENT
Start: 2017-11-28 | End: 2017-11-28

## 2017-11-28 RX ORDER — HYDROMORPHONE HYDROCHLORIDE 2 MG/ML
1 INJECTION INTRAMUSCULAR; INTRAVENOUS; SUBCUTANEOUS ONCE
Qty: 0 | Refills: 0 | Status: DISCONTINUED | OUTPATIENT
Start: 2017-11-28 | End: 2017-11-28

## 2017-11-28 RX ORDER — ACETAMINOPHEN 500 MG
1000 TABLET ORAL ONCE
Qty: 0 | Refills: 0 | Status: COMPLETED | OUTPATIENT
Start: 2017-11-28 | End: 2017-11-28

## 2017-11-28 RX ORDER — SODIUM CHLORIDE 9 MG/ML
1000 INJECTION INTRAMUSCULAR; INTRAVENOUS; SUBCUTANEOUS ONCE
Qty: 0 | Refills: 0 | Status: COMPLETED | OUTPATIENT
Start: 2017-11-28 | End: 2017-11-28

## 2017-11-28 RX ADMIN — HYDROMORPHONE HYDROCHLORIDE 1 MILLIGRAM(S): 2 INJECTION INTRAMUSCULAR; INTRAVENOUS; SUBCUTANEOUS at 19:58

## 2017-11-28 RX ADMIN — HYDROMORPHONE HYDROCHLORIDE 1 MILLIGRAM(S): 2 INJECTION INTRAMUSCULAR; INTRAVENOUS; SUBCUTANEOUS at 15:38

## 2017-11-28 RX ADMIN — HYDROMORPHONE HYDROCHLORIDE 1 MILLIGRAM(S): 2 INJECTION INTRAMUSCULAR; INTRAVENOUS; SUBCUTANEOUS at 15:07

## 2017-11-28 RX ADMIN — Medication 250 MILLIGRAM(S): at 15:07

## 2017-11-28 RX ADMIN — PIPERACILLIN AND TAZOBACTAM 200 GRAM(S): 4; .5 INJECTION, POWDER, LYOPHILIZED, FOR SOLUTION INTRAVENOUS at 17:57

## 2017-11-28 RX ADMIN — Medication 400 MILLIGRAM(S): at 21:10

## 2017-11-28 RX ADMIN — SODIUM CHLORIDE 1000 MILLILITER(S): 9 INJECTION INTRAMUSCULAR; INTRAVENOUS; SUBCUTANEOUS at 17:38

## 2017-11-28 RX ADMIN — SODIUM CHLORIDE 2000 MILLILITER(S): 9 INJECTION INTRAMUSCULAR; INTRAVENOUS; SUBCUTANEOUS at 15:08

## 2017-11-28 RX ADMIN — SODIUM CHLORIDE 1000 MILLILITER(S): 9 INJECTION INTRAMUSCULAR; INTRAVENOUS; SUBCUTANEOUS at 22:25

## 2017-11-28 RX ADMIN — HYDROMORPHONE HYDROCHLORIDE 1 MILLIGRAM(S): 2 INJECTION INTRAMUSCULAR; INTRAVENOUS; SUBCUTANEOUS at 18:45

## 2017-11-28 NOTE — ED ADULT NURSE NOTE - OBJECTIVE STATEMENT
pt received to room #17 with c/o fever and right arm pain x 3 days. pt primarily Hebrew speaking, daughter at bedside translating. pt skin warm to touch, appears dehydrated, mucous membranes dry. pt noted to be jaundice, sclera jaundice as well. skin intact. pt aox3. on HM, Sinus tach. bp noted to be hypotensive. IV placed by EMS, lab work drawn and sent to lab. rectal temp obtained. daughter at bedside. daughter remains at bedside. pending MD castano, will cont to monitor.

## 2017-11-28 NOTE — ED ADULT NURSE REASSESSMENT NOTE - NS ED NURSE REASSESS COMMENT FT1
NS infusing as per orders, pt placed on cooling blanket. surgical med student at bedside, remains on HM, sinus tach. daughter at bedside. will cont to monitor.

## 2017-11-28 NOTE — ED PROVIDER NOTE - MEDICAL DECISION MAKING DETAILS
72yoM hx of pancreatic ca and recent bacteremia pw fevers, weakness, jaundice. concern for repeat infection. will send labs, urine, cultures, cxr, RUQ US, give fluids, iv antibiotics and admit. 72yoM hx of pancreatic ca and recent bacteremia pw fevers, weakness, jaundice. concern for repeat infection. will send labs, urine, cultures, cxr, RUQ US, give fluids, iv antibiotics and admit.    Cabot: 72M with PMH of stage IV pancreatic CA s/p whipple, chemo, XRT, with recurrence, known R hydro s/p stent, and recent bacteremia (likely biliary source), who comes in with 2 days of fever to 102, RUQ and LUQ pain, and jaundice.  On exam, mildly hypotensive, looks ill, jaundiced, lungs CTAB, heart sounds normal, abd distended, TTP in the RUQ, no G/R, no CVAT, LEs without edema.  DDx includes sepsis from biliary vs urine source, less likely bowel obstruction.  Will check full labs, UA, cultures, CXR, US renal and RUQ, and likely CT A/P.  Covered with abx, hydrated.

## 2017-11-28 NOTE — ED ADULT TRIAGE NOTE - CHIEF COMPLAINT QUOTE
Pt c/o weakness.  PMH pancreatic cancer.  Not currently on chemo.  Low BP as per EMS.  800mL NS by EMS.  L FA #[id number] by EMS.    .  Daughter states dark brown urine

## 2017-11-28 NOTE — ED PROVIDER NOTE - PROGRESS NOTE DETAILS
Mo Oliver: pt stable, hospitalist requesting surgery eval. D/w surgery resident and will come see in ED. Likely non surgical however possible for IR procedure so plan to transfuse and repeat serial CBC and if there is a drop to consult IR as they may do a procedure for patient or request CTA. Will have surgery eval at bedside and then admit to med JASMINA reynolds: pt spiked fever to 102F, BP 70s/50s, responded well to fluids and cooling blanket. Declined by ICU. Declined by surgery. Plan for hospitalist admission, JASMINA Oliver: d/w hospitalist, ok for admission to floor. Pt stable in ED, BP and HR stable. No complaints. To confirm with hospitalist as per family no recent surgery or intervention since [place] in 2015.

## 2017-11-28 NOTE — ED ADULT NURSE REASSESSMENT NOTE - NS ED NURSE REASSESS COMMENT FT1
pt return from u/s. vitals as noted. MD Zavala aware, medication infusing as per MDs orders. daughter remains at bedside, will cont to monitor.

## 2017-11-28 NOTE — ED PROVIDER NOTE - OBJECTIVE STATEMENT
72yoM hx of pancreatic CA sp surgery,chemo, w/ recurrent mass and recent bacteremia pw fevers, chills, weakness, and yellowing of the skin for past 2 days. patient had temp to 102 at home 2 days ago. since has been having chills and weakness. also endorsing some abd pain, upper abd, non radiating, worse with movement, better with home Dilaudid. denies chest pain, sob, nausea, vomiting, diarrhea, numbness, tingling or other issues. 72yoM hx of pancreatic CA sp surgery, chemo, XRT, w/ recurrent mass and recent bacteremia pw fevers, chills, weakness, and yellowing of the skin for past 2 days. patient had temp to 102 at home 2 days ago. since has been having chills and weakness. also endorsing some abd pain, upper abd, non radiating, worse with movement, better with home Dilaudid. denies chest pain, sob, nausea, vomiting, diarrhea, numbness, tingling or other issues.

## 2017-11-28 NOTE — ED ADULT NURSE REASSESSMENT NOTE - NS ED NURSE REASSESS COMMENT FT1
md sousa at bedside. aware of BP and temp. daughter at bedside. no new orders at this time. will cont to monitor.

## 2017-11-28 NOTE — ED PROVIDER NOTE - ATTENDING CONTRIBUTION TO CARE
I, Jennifer Cabot, MD, have performed a history and physical exam of the patient and discussed their management with the resident. I reviewed the resident's note and agree with the documented findings and plan of care. My medical decision making and observations are found above.    Cabot: 72M with PMH of stage IV pancreatic CA s/p whipple, chemo, XRT, with recurrence, known R hydro s/p stent, and recent bacteremia (likely biliary source), who comes in with 2 days of fever to 102, RUQ and LUQ pain, and jaundice.  On exam, mildly hypotensive, looks ill, jaundiced, lungs CTAB, heart sounds normal, abd distended, TTP in the RUQ, no G/R, no CVAT, LEs without edema.  DDx includes sepsis from biliary vs urine source, less likely bowel obstruction.  Will check full labs, UA, cultures, CXR, US renal and RUQ, and likely CT A/P.  Covered with abx, hydrated. I, Jennifer Cabot, MD, have performed a history and physical exam of the patient and discussed their management with the resident and PA. I reviewed the resident's and PA's notes and agree with the documented findings and plan of care. My medical decision making and observations are found above.    Cabot: 72M with PMH of stage IV pancreatic CA s/p whipple, chemo, XRT, with recurrence, known R hydro s/p stent, and recent bacteremia (likely biliary source), who comes in with 2 days of fever to 102, RUQ and LUQ pain, and jaundice.  On exam, mildly hypotensive, looks ill, jaundiced, lungs CTAB, heart sounds normal, abd distended, TTP in the RUQ, no G/R, no CVAT, LEs without edema.  DDx includes sepsis from biliary vs urine source, less likely bowel obstruction.  Will check full labs, UA, cultures, CXR, US renal and RUQ, and likely CT A/P.  Covered with abx, hydrated.

## 2017-11-28 NOTE — CONSULT NOTE ADULT - SUBJECTIVE AND OBJECTIVE BOX
CHIEF COMPLAINT:      HPI: 72M PMHx of pancreatic adenocarcinoma( ) s/p whipple/ s/p gemcitabine X 6 cycles, and 1 month of radiation  (last tx with gemcitabine in ) and Xeloda p/w left hip pain and fever. Patient recently admitted to [facility] a few weeks ago, found to have right RP mass on CT likely recurrence of pancreatic CA vs. new malignancy c/b R. ureteral compression , hydronephrosis  sepsis 2/2 pyelo requiring urgent ureteral stent placement by urology for decompression. S/p RP mass biopsy on 17 and episode of melena. Hospital course complicated by sepsis secondary to e. coli bacteremia.. patient dc'd home and since has been having some left hip pain. Pt hasn't been able to walk for the past few days as well as yellowing of the skin and eyes. Also noted fever today at home. Patient is Portuguese only speaking,  services offered and declined, daughter at bedside provides translation.    PAST MEDICAL & SURGICAL HISTORY:  Adenocarcinoma: Periampulla   Hepatobiliary type  Pancreatic carcinoma: s/p whipple in   Jejunostomy tube in situ      FAMILY HISTORY:  Family history of heart disease (Father)      SOCIAL HISTORY:  Smoking: [ ] Never Smoked [ ] Former Smoker (__ packs x ___ years) [ ] Current Smoker  (__ packs x ___ years)  Substance Use: [ ] Never Used [ ] Used ____  EtOH Use:  Marital Status: [ ] Single [ ]  [ ]  [ ]   Sexual History:   Occupation:  Recent Travel:  Country of Birth:  Advance Directives:    Allergies    No Known Allergies    Intolerances    HOME MEDICATIONS:  Home Medications:    REVIEW OF SYSTEMS:  Constitutional: [ ] negative [x ] fevers [ ] chills [ ] weight loss [ ] weight gain  HEENT: [ ] negative [ ] dry eyes [ ] eye irritation [ ] postnasal drip [ ] nasal congestion  CV: [ ] negative  [ ] chest pain [ ] orthopnea [ ] palpitations [ ] murmur  Resp: [ ] negative [ ] cough [ ] shortness of breath [ ] dyspnea [ ] wheezing [ ] sputum [ ] hemoptysis  GI: [ ] negative [ ] nausea [ ] vomiting [ ] diarrhea [ ] constipation [x ] abd pain [ ] dysphagia   : [ ] negative [ ] dysuria [ ] nocturia [ ] hematuria [ ] increased urinary frequency  Musculoskeletal: [ ] negative [ ] back pain [ ] myalgias [ ] arthralgias [ ] fracture [x] hip pain, left  Skin: [ ] negative [ ] rash [ ] itch [x] jaundice  Neurological: [ ] negative [ ] headache [ ] dizziness [ ] syncope [ ] weakness [ ] numbness  Psychiatric: [ ] negative [ ] anxiety [ ] depression  Endocrine: [ ] negative [ ] diabetes [ ] thyroid problem  Hematologic/Lymphatic: [ ] negative [ ] anemia [ ] bleeding problem  Allergic/Immunologic: [ ] negative [ ] itchy eyes [ ] nasal discharge [ ] hives [ ] angioedema  [x ] All other systems negative  [ ] Unable to assess ROS because ________    OBJECTIVE:  ICU Vital Signs Last 24 Hrs  T(C): 38.2 (2017 23:39), Max: 38.7 (2017 20:17)  T(F): 100.7 (2017 23:39), Max: 101.7 (2017 20:17)  HR: 103 (2017 23:39) (79 - 107)  BP: 116/53 (2017 23:39) (72/54 - 120/67)  BP(mean): 82 (2017 17:40) (82 - 82)  ABP: --  ABP(mean): --  RR: 20 (2017 23:39) (11 - 21)  SpO2: 99% (2017 23:39) (98% - 100%)        PHYSICAL EXAM:  General: NAD, resting comfortably  HEENT: scleral icterus,   Lymph Nodes:  Neck:   Respiratory:   Cardiovascular:   Abdomen:   Extremities:   Skin:   Neurological:  Psychiatry:    LINES:     HOSPITAL MEDICATIONS:  Standing Meds:      PRN Meds:      LABS:                        6.9    8.61  )-----------( 418      ( 2017 14:27 )             19.9     Hgb Trend: 6.9<--      129<L>  |  93<L>  |  25<H>  ----------------------------<  101<H>  4.2   |  21<L>  |  0.96    Ca    8.0<L>      2017 14:27    TPro  5.8<L>  /  Alb  2.5<L>  /  TBili  9.4<H>  /  DBili  x   /  AST  127<H>  /  ALT  82<H>  /  AlkPhos  701<H>      Creatinine Trend: 0.96<--, 1.04<--, 0.95<--, 1.03<--, 1.13<--, 1.30<--    Urinalysis Basic - ( 2017 19:42 )    Color: YELLOW / Appearance: HAZY / S.021 / pH: 6.0  Gluc: NEGATIVE / Ketone: NEGATIVE  / Bili: LARGE / Urobili: 4 E.U.   Blood: SMALL / Protein: 100 / Nitrite: NEGATIVE   Leuk Esterase: MODERATE / RBC: 5-10 / WBC 5-10   Sq Epi: OCC / Non Sq Epi: x / Bacteria: MOD        Venous Blood Gas:   @ 14:27  7.41/39/44/24/76.1  VBG Lactate: 0.9      MICROBIOLOGY:       RADIOLOGY:  [ ] Reviewed and interpreted by me    EKG: CHIEF COMPLAINT: left hip pain, fever      HPI: 72M PMHx of pancreatic adenocarcinoma( ) s/p whipple/ s/p gemcitabine X 6 cycles, and 1 month of radiation  (last tx with gemcitabine in ) and Xeloda p/w left hip pain and fever. Patient recently admitted to [facility] a few weeks ago, found to have right RP mass on CT likely recurrence of pancreatic CA vs. new malignancy c/b R. ureteral compression , hydronephrosis  sepsis 2/2 pyelo requiring urgent ureteral stent placement by urology for decompression. S/p RP mass biopsy on 17 and episode of melena. Hospital course complicated by sepsis secondary to e. coli bacteremia. patient dc'd home and since has been having some left hip pain. Pt hasn't been able to walk for the past few days as well as yellowing of the skin and eyes. Also noted fever today at home. In ED patient was found to be febrile and anemic with (-) stool occult blood. CT a/p revealed Large left iliacus muscle hematoma and slightly increased intrahepatic biliary dilatation. Patient is Tongan only speaking,  services offered and declined, daughter at bedside provides translation.    PAST MEDICAL & SURGICAL HISTORY:  Adenocarcinoma: Periampulla   Hepatobiliary type  Pancreatic carcinoma: s/p whipple in   Jejunostomy tube in situ      FAMILY HISTORY:  Family history of heart disease (Father)      SOCIAL HISTORY:  Smoking: [ ] Never Smoked [ ] Former Smoker (__ packs x ___ years) [ ] Current Smoker  (__ packs x ___ years)  Substance Use: [ ] Never Used [ ] Used ____  EtOH Use:  Marital Status: [ ] Single [ ]  [ ]  [ ]   Sexual History:   Occupation:  Recent Travel:  Country of Birth:  Advance Directives:    Allergies    No Known Allergies    Intolerances    HOME MEDICATIONS:  Home Medications:    REVIEW OF SYSTEMS:  Constitutional: [ ] negative [x ] fevers [ ] chills [ ] weight loss [ ] weight gain  HEENT: [ ] negative [ ] dry eyes [ ] eye irritation [ ] postnasal drip [ ] nasal congestion  CV: [ ] negative  [ ] chest pain [ ] orthopnea [ ] palpitations [ ] murmur  Resp: [ ] negative [ ] cough [ ] shortness of breath [ ] dyspnea [ ] wheezing [ ] sputum [ ] hemoptysis  GI: [ ] negative [ ] nausea [ ] vomiting [ ] diarrhea [ ] constipation [x ] abd pain [ ] dysphagia   : [ ] negative [ ] dysuria [ ] nocturia [ ] hematuria [ ] increased urinary frequency  Musculoskeletal: [ ] negative [ ] back pain [ ] myalgias [ ] arthralgias [ ] fracture [x] hip pain, left  Skin: [ ] negative [ ] rash [ ] itch [x] jaundice  Neurological: [ ] negative [ ] headache [ ] dizziness [ ] syncope [ ] weakness [ ] numbness  Psychiatric: [ ] negative [ ] anxiety [ ] depression  Endocrine: [ ] negative [ ] diabetes [ ] thyroid problem  Hematologic/Lymphatic: [ ] negative [ ] anemia [ ] bleeding problem  Allergic/Immunologic: [ ] negative [ ] itchy eyes [ ] nasal discharge [ ] hives [ ] angioedema  [x ] All other systems negative  [ ] Unable to assess ROS because ________    OBJECTIVE:  ICU Vital Signs Last 24 Hrs  T(C): 38.2 (2017 23:39), Max: 38.7 (2017 20:17)  T(F): 100.7 (2017 23:39), Max: 101.7 (2017 20:17)  HR: 103 (2017 23:39) (79 - 107)  BP: 116/53 (2017 23:39) (72/54 - 120/67)  BP(mean): 82 (2017 17:40) (82 - 82)  ABP: --  ABP(mean): --  RR: 20 (2017 23:39) (11 - 21)  SpO2: 99% (2017 23:39) (98% - 100%)        PHYSICAL EXAM:  General: NAD, resting comfortably  HEENT: scleral icterus, dry mucosa  Lymph Nodes: no LAD  Neck: supple  Respiratory: CTA b/l. good air entry, no WRR  Cardiovascular: tachycardiac, normal rhythm. (+) S1S2  Abdomen: tender to RUQ  Extremities: tender over left hip. moving all ext  Skin: (+) jaundice  Neurological: alert and oriented  Psychiatry: normal affect    LINES: [facility]    HOSPITAL MEDICATIONS:  Standing Meds:      PRN Meds:      LABS:                        6.9    8.61  )-----------( 418      ( 2017 14:27 )             19.9     Hgb Trend: 6.9<--  11-28    129<L>  |  93<L>  |  25<H>  ----------------------------<  101<H>  4.2   |  21<L>  |  0.96    Ca    8.0<L>      2017 14:27    TPro  5.8<L>  /  Alb  2.5<L>  /  TBili  9.4<H>  /  DBili  x   /  AST  127<H>  /  ALT  82<H>  /  AlkPhos  701<H>      Creatinine Trend: 0.96<--, 1.04<--, 0.95<--, 1.03<--, 1.13<--, 1.30<--    Urinalysis Basic - ( 2017 19:42 )    Color: YELLOW / Appearance: HAZY / S.021 / pH: 6.0  Gluc: NEGATIVE / Ketone: NEGATIVE  / Bili: LARGE / Urobili: 4 E.U.   Blood: SMALL / Protein: 100 / Nitrite: NEGATIVE   Leuk Esterase: MODERATE / RBC: 5-10 / WBC 5-10   Sq Epi: OCC / Non Sq Epi: x / Bacteria: MOD        Venous Blood Gas:   @ 14:27  7.41/39/44/24/76.1  VBG Lactate: 0.9      MICROBIOLOGY:       RADIOLOGY:  [ x ] Reviewed and interpreted by me

## 2017-11-29 ENCOUNTER — TRANSCRIPTION ENCOUNTER (OUTPATIENT)
Age: 72
End: 2017-11-29

## 2017-11-29 DIAGNOSIS — I82.90 ACUTE EMBOLISM AND THROMBOSIS OF UNSPECIFIED VEIN: ICD-10-CM

## 2017-11-29 DIAGNOSIS — C80.1 MALIGNANT (PRIMARY) NEOPLASM, UNSPECIFIED: ICD-10-CM

## 2017-11-29 DIAGNOSIS — D50.0 IRON DEFICIENCY ANEMIA SECONDARY TO BLOOD LOSS (CHRONIC): ICD-10-CM

## 2017-11-29 DIAGNOSIS — E87.1 HYPO-OSMOLALITY AND HYPONATREMIA: ICD-10-CM

## 2017-11-29 DIAGNOSIS — C25.9 MALIGNANT NEOPLASM OF PANCREAS, UNSPECIFIED: ICD-10-CM

## 2017-11-29 DIAGNOSIS — K76.89 OTHER SPECIFIED DISEASES OF LIVER: ICD-10-CM

## 2017-11-29 DIAGNOSIS — Z51.5 ENCOUNTER FOR PALLIATIVE CARE: ICD-10-CM

## 2017-11-29 DIAGNOSIS — R50.9 FEVER, UNSPECIFIED: ICD-10-CM

## 2017-11-29 DIAGNOSIS — A41.9 SEPSIS, UNSPECIFIED ORGANISM: ICD-10-CM

## 2017-11-29 DIAGNOSIS — R52 PAIN, UNSPECIFIED: ICD-10-CM

## 2017-11-29 DIAGNOSIS — K59.00 CONSTIPATION, UNSPECIFIED: ICD-10-CM

## 2017-11-29 DIAGNOSIS — R53.81 OTHER MALAISE: ICD-10-CM

## 2017-11-29 LAB
ALBUMIN SERPL ELPH-MCNC: 2.4 G/DL — LOW (ref 3.3–5)
ALP SERPL-CCNC: 691 U/L — HIGH (ref 40–120)
ALT FLD-CCNC: 85 U/L — HIGH (ref 4–41)
APTT BLD: 28.6 SEC — SIGNIFICANT CHANGE UP (ref 27.5–37.4)
AST SERPL-CCNC: 127 U/L — HIGH (ref 4–40)
BASOPHILS # BLD AUTO: 0.02 K/UL — SIGNIFICANT CHANGE UP (ref 0–0.2)
BASOPHILS NFR BLD AUTO: 0.2 % — SIGNIFICANT CHANGE UP (ref 0–2)
BILIRUB SERPL-MCNC: 11 MG/DL — HIGH (ref 0.2–1.2)
BUN SERPL-MCNC: 17 MG/DL — SIGNIFICANT CHANGE UP (ref 7–23)
CALCIUM SERPL-MCNC: 7.8 MG/DL — LOW (ref 8.4–10.5)
CHLORIDE SERPL-SCNC: 96 MMOL/L — LOW (ref 98–107)
CO2 SERPL-SCNC: 18 MMOL/L — LOW (ref 22–31)
CREAT SERPL-MCNC: 0.83 MG/DL — SIGNIFICANT CHANGE UP (ref 0.5–1.3)
EOSINOPHIL # BLD AUTO: 0.07 K/UL — SIGNIFICANT CHANGE UP (ref 0–0.5)
EOSINOPHIL NFR BLD AUTO: 0.6 % — SIGNIFICANT CHANGE UP (ref 0–6)
GLUCOSE SERPL-MCNC: 90 MG/DL — SIGNIFICANT CHANGE UP (ref 70–99)
HCT VFR BLD CALC: 24.5 % — LOW (ref 39–50)
HGB BLD-MCNC: 8.2 G/DL — LOW (ref 13–17)
IMM GRANULOCYTES # BLD AUTO: 0.13 # — SIGNIFICANT CHANGE UP
IMM GRANULOCYTES NFR BLD AUTO: 1.1 % — SIGNIFICANT CHANGE UP (ref 0–1.5)
INR BLD: 1.31 — HIGH (ref 0.88–1.17)
LYMPHOCYTES # BLD AUTO: 0.56 K/UL — LOW (ref 1–3.3)
LYMPHOCYTES # BLD AUTO: 4.7 % — LOW (ref 13–44)
MAGNESIUM SERPL-MCNC: 2.3 MG/DL — SIGNIFICANT CHANGE UP (ref 1.6–2.6)
MCHC RBC-ENTMCNC: 28.7 PG — SIGNIFICANT CHANGE UP (ref 27–34)
MCHC RBC-ENTMCNC: 33.5 % — SIGNIFICANT CHANGE UP (ref 32–36)
MCV RBC AUTO: 85.7 FL — SIGNIFICANT CHANGE UP (ref 80–100)
MONOCYTES # BLD AUTO: 0.84 K/UL — SIGNIFICANT CHANGE UP (ref 0–0.9)
MONOCYTES NFR BLD AUTO: 7 % — SIGNIFICANT CHANGE UP (ref 2–14)
NEUTROPHILS # BLD AUTO: 10.34 K/UL — HIGH (ref 1.8–7.4)
NEUTROPHILS NFR BLD AUTO: 86.4 % — HIGH (ref 43–77)
NRBC # FLD: 0 — SIGNIFICANT CHANGE UP
PHOSPHATE SERPL-MCNC: 2.6 MG/DL — SIGNIFICANT CHANGE UP (ref 2.5–4.5)
PLATELET # BLD AUTO: 431 K/UL — HIGH (ref 150–400)
PMV BLD: 10.6 FL — SIGNIFICANT CHANGE UP (ref 7–13)
POTASSIUM SERPL-MCNC: 4.5 MMOL/L — SIGNIFICANT CHANGE UP (ref 3.5–5.3)
POTASSIUM SERPL-SCNC: 4.5 MMOL/L — SIGNIFICANT CHANGE UP (ref 3.5–5.3)
PROT SERPL-MCNC: 5.8 G/DL — LOW (ref 6–8.3)
PROTHROM AB SERPL-ACNC: 14.8 SEC — HIGH (ref 9.8–13.1)
RBC # BLD: 2.86 M/UL — LOW (ref 4.2–5.8)
RBC # FLD: 16.7 % — HIGH (ref 10.3–14.5)
SODIUM SERPL-SCNC: 129 MMOL/L — LOW (ref 135–145)
SPECIMEN SOURCE: SIGNIFICANT CHANGE UP
SPECIMEN SOURCE: SIGNIFICANT CHANGE UP
WBC # BLD: 11.96 K/UL — HIGH (ref 3.8–10.5)
WBC # FLD AUTO: 11.96 K/UL — HIGH (ref 3.8–10.5)

## 2017-11-29 PROCEDURE — 99222 1ST HOSP IP/OBS MODERATE 55: CPT | Mod: GC

## 2017-11-29 PROCEDURE — 99497 ADVNCD CARE PLAN 30 MIN: CPT | Mod: GC

## 2017-11-29 PROCEDURE — 99223 1ST HOSP IP/OBS HIGH 75: CPT | Mod: GC

## 2017-11-29 PROCEDURE — 99223 1ST HOSP IP/OBS HIGH 75: CPT

## 2017-11-29 RX ORDER — HYDROMORPHONE HYDROCHLORIDE 2 MG/ML
1 INJECTION INTRAMUSCULAR; INTRAVENOUS; SUBCUTANEOUS EVERY 4 HOURS
Qty: 0 | Refills: 0 | Status: DISCONTINUED | OUTPATIENT
Start: 2017-11-29 | End: 2017-11-29

## 2017-11-29 RX ORDER — PIPERACILLIN AND TAZOBACTAM 4; .5 G/20ML; G/20ML
3.38 INJECTION, POWDER, LYOPHILIZED, FOR SOLUTION INTRAVENOUS EVERY 8 HOURS
Qty: 0 | Refills: 0 | Status: DISCONTINUED | OUTPATIENT
Start: 2017-11-29 | End: 2017-11-29

## 2017-11-29 RX ORDER — SENNA PLUS 8.6 MG/1
2 TABLET ORAL AT BEDTIME
Qty: 0 | Refills: 0 | Status: DISCONTINUED | OUTPATIENT
Start: 2017-11-29 | End: 2017-12-02

## 2017-11-29 RX ORDER — FINASTERIDE 5 MG/1
5 TABLET, FILM COATED ORAL DAILY
Qty: 0 | Refills: 0 | Status: DISCONTINUED | OUTPATIENT
Start: 2017-11-29 | End: 2017-12-02

## 2017-11-29 RX ORDER — CEFEPIME 1 G/1
2000 INJECTION, POWDER, FOR SOLUTION INTRAMUSCULAR; INTRAVENOUS EVERY 12 HOURS
Qty: 0 | Refills: 0 | Status: DISCONTINUED | OUTPATIENT
Start: 2017-11-29 | End: 2017-12-02

## 2017-11-29 RX ORDER — TAMSULOSIN HYDROCHLORIDE 0.4 MG/1
0.4 CAPSULE ORAL AT BEDTIME
Qty: 0 | Refills: 0 | Status: DISCONTINUED | OUTPATIENT
Start: 2017-11-29 | End: 2017-12-02

## 2017-11-29 RX ORDER — HYDROMORPHONE HYDROCHLORIDE 2 MG/ML
0.5 INJECTION INTRAMUSCULAR; INTRAVENOUS; SUBCUTANEOUS
Qty: 0 | Refills: 0 | Status: DISCONTINUED | OUTPATIENT
Start: 2017-11-29 | End: 2017-11-29

## 2017-11-29 RX ORDER — MORPHINE SULFATE 50 MG/1
4 CAPSULE, EXTENDED RELEASE ORAL ONCE
Qty: 0 | Refills: 0 | Status: DISCONTINUED | OUTPATIENT
Start: 2017-11-29 | End: 2017-11-29

## 2017-11-29 RX ORDER — ACETAMINOPHEN 500 MG
1000 TABLET ORAL ONCE
Qty: 0 | Refills: 0 | Status: DISCONTINUED | OUTPATIENT
Start: 2017-11-29 | End: 2017-11-29

## 2017-11-29 RX ORDER — HYDROMORPHONE HYDROCHLORIDE 2 MG/ML
1 INJECTION INTRAMUSCULAR; INTRAVENOUS; SUBCUTANEOUS
Qty: 0 | Refills: 0 | Status: DISCONTINUED | OUTPATIENT
Start: 2017-11-29 | End: 2017-11-29

## 2017-11-29 RX ORDER — SODIUM CHLORIDE 9 MG/ML
1000 INJECTION INTRAMUSCULAR; INTRAVENOUS; SUBCUTANEOUS
Qty: 0 | Refills: 0 | Status: DISCONTINUED | OUTPATIENT
Start: 2017-11-29 | End: 2017-11-30

## 2017-11-29 RX ORDER — LACTOBACILLUS ACIDOPHILUS 100MM CELL
1 CAPSULE ORAL EVERY 12 HOURS
Qty: 0 | Refills: 0 | Status: DISCONTINUED | OUTPATIENT
Start: 2017-11-29 | End: 2017-12-02

## 2017-11-29 RX ORDER — SIMETHICONE 80 MG/1
80 TABLET, CHEWABLE ORAL EVERY 6 HOURS
Qty: 0 | Refills: 0 | Status: DISCONTINUED | OUTPATIENT
Start: 2017-11-29 | End: 2017-12-02

## 2017-11-29 RX ORDER — HYDROMORPHONE HYDROCHLORIDE 2 MG/ML
1 INJECTION INTRAMUSCULAR; INTRAVENOUS; SUBCUTANEOUS
Qty: 0 | Refills: 0 | Status: DISCONTINUED | OUTPATIENT
Start: 2017-11-29 | End: 2017-12-01

## 2017-11-29 RX ORDER — HYDROMORPHONE HYDROCHLORIDE 2 MG/ML
2 INJECTION INTRAMUSCULAR; INTRAVENOUS; SUBCUTANEOUS EVERY 4 HOURS
Qty: 0 | Refills: 0 | Status: DISCONTINUED | OUTPATIENT
Start: 2017-11-29 | End: 2017-11-29

## 2017-11-29 RX ORDER — DOCUSATE SODIUM 100 MG
100 CAPSULE ORAL THREE TIMES A DAY
Qty: 0 | Refills: 0 | Status: DISCONTINUED | OUTPATIENT
Start: 2017-11-29 | End: 2017-12-02

## 2017-11-29 RX ORDER — CEFEPIME 1 G/1
2000 INJECTION, POWDER, FOR SOLUTION INTRAMUSCULAR; INTRAVENOUS EVERY 12 HOURS
Qty: 0 | Refills: 0 | Status: DISCONTINUED | OUTPATIENT
Start: 2017-11-29 | End: 2017-11-29

## 2017-11-29 RX ORDER — VANCOMYCIN HCL 1 G
1000 VIAL (EA) INTRAVENOUS EVERY 12 HOURS
Qty: 0 | Refills: 0 | Status: DISCONTINUED | OUTPATIENT
Start: 2017-11-29 | End: 2017-12-02

## 2017-11-29 RX ADMIN — HYDROMORPHONE HYDROCHLORIDE 1 MILLIGRAM(S): 2 INJECTION INTRAMUSCULAR; INTRAVENOUS; SUBCUTANEOUS at 21:12

## 2017-11-29 RX ADMIN — MORPHINE SULFATE 4 MILLIGRAM(S): 50 CAPSULE, EXTENDED RELEASE ORAL at 04:10

## 2017-11-29 RX ADMIN — TAMSULOSIN HYDROCHLORIDE 0.4 MILLIGRAM(S): 0.4 CAPSULE ORAL at 21:47

## 2017-11-29 RX ADMIN — HYDROMORPHONE HYDROCHLORIDE 0.5 MILLIGRAM(S): 2 INJECTION INTRAMUSCULAR; INTRAVENOUS; SUBCUTANEOUS at 12:18

## 2017-11-29 RX ADMIN — MORPHINE SULFATE 4 MILLIGRAM(S): 50 CAPSULE, EXTENDED RELEASE ORAL at 04:25

## 2017-11-29 RX ADMIN — Medication 250 MILLIGRAM(S): at 17:00

## 2017-11-29 RX ADMIN — HYDROMORPHONE HYDROCHLORIDE 0.5 MILLIGRAM(S): 2 INJECTION INTRAMUSCULAR; INTRAVENOUS; SUBCUTANEOUS at 12:02

## 2017-11-29 RX ADMIN — Medication 100 MILLIGRAM(S): at 13:13

## 2017-11-29 RX ADMIN — HYDROMORPHONE HYDROCHLORIDE 1 MILLIGRAM(S): 2 INJECTION INTRAMUSCULAR; INTRAVENOUS; SUBCUTANEOUS at 20:58

## 2017-11-29 RX ADMIN — Medication 1 TABLET(S): at 17:00

## 2017-11-29 RX ADMIN — SENNA PLUS 2 TABLET(S): 8.6 TABLET ORAL at 21:47

## 2017-11-29 RX ADMIN — FINASTERIDE 5 MILLIGRAM(S): 5 TABLET, FILM COATED ORAL at 13:13

## 2017-11-29 RX ADMIN — Medication 100 MILLIGRAM(S): at 21:47

## 2017-11-29 RX ADMIN — CEFEPIME 100 MILLIGRAM(S): 1 INJECTION, POWDER, FOR SOLUTION INTRAMUSCULAR; INTRAVENOUS at 17:00

## 2017-11-29 NOTE — PROGRESS NOTE ADULT - PROBLEM SELECTOR PLAN 2
-likely 2/2 left iliacus hematoma  -s/p 1U PRBC with appropriate rise in Hgb  -holding lovenox   -dilaudid prn for pain   -SCDs for ppx -likely 2/2 left iliacus hematoma  -s/p 1U PRBC with appropriate rise in Hgb  -holding lovenox   -SCDs for ppx

## 2017-11-29 NOTE — H&P ADULT - HISTORY OF PRESENT ILLNESS
72M (Slovak speaking) h/o periampullary adenocarcinoma( 2015) s/p whipple/ and gemcitabine X 6 cycles, xeloda and RT (last chemo in 2016) and p/w fever. Patient recently admitted to [facility] on 11/2. Found to have right RP mass on CT likely recurrence of pancreatic CA vs. new malignancy c/b R. ureteral compression, hydronephrosis, sepsis 2/2 pyelo requiring urgent ureteral stent placement, and IVC tumor thrombus requiring lovenox BID. S/p RP mass biopsy on 11/9/17 and hospital course further complicated by e. coli bacteremia. Patient discharged home to complete course of levaquin.     Over past several days, patient has had difficulty ambulating 2/2 left hip pain, worsening jaundice and abdominal pain, subjective fevers with dysuria. Continues to have adequate urine output. Appetite and PO intake has been poor.  No HA, CP, SOB, N/V/D but continues to have constipation. No melena or BRBPR. No sick contacts or recent travel. No recent falls or trauma. Continues to take lovenox BID for IVC tumor thrombus.     In ED initial vitals 100 (max 102.6), /56-78,  20, 98 on RA. Given total of 3L NS, vanc, zosyn, IV tylenol, dilaudid, morphine, 1U PRBC for hgb 6.9. 72M (Slovak speaking) h/o periampullary adenocarcinoma( 2015) s/p whipple/ and gemcitabine X 6 cycles, xeloda and RT (last chemo in 2016) and p/w fever, abn and L hip pain. Patient recently admitted to [facility] on 11/2. Found to have right RP mass on CT likely recurrence of pancreatic CA vs. new malignancy c/b R. ureteral compression, hydronephrosis, sepsis 2/2 pyelo requiring urgent ureteral stent placement, and IVC tumor thrombus requiring lovenox BID. S/p RP mass biopsy on 11/9/17 and hospital course further complicated by e. coli bacteremia. Patient discharged home to complete course of levaquin.     Over past several days, patient has had difficulty ambulating 2/2 left hip pain, worsening jaundice and abdominal pain, subjective fevers with dysuria. Continues to have adequate urine output. Appetite and PO intake has been poor.  No HA, CP, SOB, N/V/D but continues to have constipation. No melena or BRBPR. No sick contacts or recent travel. No recent falls or trauma. Continues to take lovenox BID for IVC tumor thrombus.     In ED initial vitals 100 (max 102.6), /56-78,  20, 98 on RA. Given total of 3L NS, vanc, zosyn, IV tylenol, dilaudid, morphine, 1U PRBC for hgb 6.9.

## 2017-11-29 NOTE — H&P ADULT - PROBLEM SELECTOR PLAN 5
-elevated Tbili, alk phos and AST, ikely 2/2 congestive hepatopathy   -GI c/s in am for possible palliative stent placement -elevated Tbili, alk phos and AST, likely 2/2 congestive hepatopathy   -GI c/s in am for possible palliative stent placement

## 2017-11-29 NOTE — DISCHARGE NOTE ADULT - PLAN OF CARE
Relief of pain Please take all medications as prescribed. You're pancreatic cancer has returned and is causing blockages in your liver, which is causing your abd pain. Please continue to take your pain medications to control your abd pain. You came in with hip pain and were found to have a bleed in your thigh. We stopped your blood thinner for your IVC thrombus. Due to your overall condition, we don't recommend restarting the blood thinner. You were on IV abx to control your infection likely from your cancer causing the obstruction. We changed you to oral antibiotics which you can take until 12/9.

## 2017-11-29 NOTE — H&P ADULT - PROBLEM SELECTOR PLAN 3
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

## 2017-11-29 NOTE — PROGRESS NOTE ADULT - PROBLEM SELECTOR PLAN 3
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

## 2017-11-29 NOTE — DISCHARGE NOTE ADULT - CARE PLAN
Principal Discharge DX:	Pancreatic carcinoma  Goal:	Relief of pain Principal Discharge DX:	Pancreatic carcinoma  Goal:	Relief of pain  Instructions for follow-up, activity and diet:	Please take all medications as prescribed. Principal Discharge DX:	Pancreatic carcinoma  Goal:	Relief of pain  Instructions for follow-up, activity and diet:	You're pancreatic cancer has returned and is causing blockages in your liver, which is causing your abd pain. Please continue to take your pain medications to control your abd pain.  Secondary Diagnosis:	Thrombus  Instructions for follow-up, activity and diet:	You came in with hip pain and were found to have a bleed in your thigh. We stopped your blood thinner for your IVC thrombus. Due to your overall condition, we don't recommend restarting the blood thinner. Principal Discharge DX:	Pancreatic carcinoma  Goal:	Relief of pain  Instructions for follow-up, activity and diet:	You're pancreatic cancer has returned and is causing blockages in your liver, which is causing your abd pain. Please continue to take your pain medications to control your abd pain.  Secondary Diagnosis:	Thrombus  Instructions for follow-up, activity and diet:	You came in with hip pain and were found to have a bleed in your thigh. We stopped your blood thinner for your IVC thrombus. Due to your overall condition, we don't recommend restarting the blood thinner.  Secondary Diagnosis:	Sepsis, due to unspecified organism  Instructions for follow-up, activity and diet:	You were on IV abx to control your infection likely from your cancer causing the obstruction. We changed you to oral antibiotics which you can take until 12/9.

## 2017-11-29 NOTE — H&P ADULT - ASSESSMENT
72M (Croatian speaking) h/o periampullary adenocarcinoma( 2015) s/p whipple/ and gemcitabine X 6 cycles, xeloda and RT (last chemo in 2016) and p/w fever, abn and L hip pain. Found to be septic 2/2 UTI and with L iliacus hematoma in setting of therapeutic lovenox use:

## 2017-11-29 NOTE — H&P ADULT - ATTENDING COMMENTS
71 y/o male HX of Pancreatic CA, S/P whipple, Chemo TX, RTX, pt was in [facility] recently for Sepsis, Bacteremia, Recurrent Pancreatic mass, [place] CA, S/P Rt Ureteral stent was placed last admission due to Hydronephrosis, + Anemia, pt is on SQ Lovenox at home for IVC tumor thrombosis, + Left Hip pain, abdominal pain, + fever, UA +, UTI, Sepsis, Hgb 6.9, MICU Rejected the pt, S/P one PRBC, Hypotensive in the ER, responded to IVF NS, CT A/P New Large Hematoma, left iliacus muscle, seen by surgery no intervention, High LFT, high Total Bili as per lab,   Poor Prognosis, IV Vanco , IV Zosyn, ID consult, Hem Onc Consult, Palliative care consult, Bacid, F/U CBC, CMP, Cultures, PT, PTT, INR, IVF NS @ 75 cc/hr, fall/aspiration precaution, Hold SQ Lovenox for Now, IR consult, GI consult, Clear Liquid diet, on Proscar, Flomax, Avoid NSAIDS , avoid Tylenol ,    Case D/W Pt , Pt's daughter, HS,    Pt was seen & examined by me, Dr. ABDI Skinner on 11/29/17.

## 2017-11-29 NOTE — CONSULT NOTE ADULT - SUBJECTIVE AND OBJECTIVE BOX
GENERAL SURGERY CONSULT NOTE  B team surgery  [id number]    HPI    PAST MEDICAL & SURGICAL HISTORY:  Adenocarcinoma: Periampulla   Hepatobiliary type  Pancreatic carcinoma: s/p whipple in 2015        Systemic:	[x ] Fever	[ x] Chills	[ ] Night sweats    [x ] Fatigue	[ ] Other  [] Cardiovascular:  [] Pulmonary:  [] Renal/Urologic:  [x] Gastrointestinal: epigastric abdominal pain, LLQ pain  [] Metabolic:  [] Neurologic:  [] Hematologic:  [] ENT:  [] Ophthalmologic:  [x] Musculoskeletal: LLQ and L thigh pain, weakness of L leg    MEDICATIONS  (STANDING):  Lovenox  levofloxacin finished today   tamsulosin  finasteride  dilaudid  senna  docusate  simethicone  nystatin  iron      Allergies  No Known Allergies    SOCIAL HISTORY:  Pt lives at home with family.     FAMILY HISTORY:  Family history of heart disease      Vital Signs Last 24 Hrs  T(C): 37.1 (2017 03:10), Max: 39.2 (2017 22:13)  T(F): 98.7 (2017 03:10), Max: 102.6 (2017 22:13)  HR: 84 (2017 03:10) (79 - 107)  BP: 110/65 (2017 03:10) (72/54 - 120/67)  BP(mean): 82 (2017 17:40) (82 - 82)  RR: 14 (2017 03:10) (11 - 21)  SpO2: 100% (2017 03:10) (98% - 100%)    PHYSICAL EXAM:    Constitutional: NAD    Eyes: anicteric    ENMT: no rhinorrhea    Neck: supple    Respiratory: Clear bilaterally, respirations not labored, no retractions    Cardiovascular: RRR, NL S1S2    Gastrointestinal: Soft, ND, NT    Genitourinary: Normal external genitalia    Rectal:    Extremities: No deformities    Vascular: Ext. warm, normal cap refill    Neurological: sensation and motor intact to all extremities    Skin: warm, dry, no rash    Lymph Nodes: no palpable adenopathy      LABS:                        6.9    8.61  )-----------( 418      ( 2017 14:27 )             19.9     11-28    129<L>  |  93<L>  |  25<H>  ----------------------------<  101<H>  4.2   |  21<L>  |  0.96    Ca    8.0<L>      2017 14:27    TPro  5.8<L>  /  Alb  2.5<L>  /  TBili  9.4<H>  /  DBili  x   /  AST  127<H>  /  ALT  82<H>  /  AlkPhos  701<H>        Urinalysis Basic - ( 2017 19:42 )    Color: YELLOW / Appearance: HAZY / S.021 / pH: 6.0  Gluc: NEGATIVE / Ketone: NEGATIVE  / Bili: LARGE / Urobili: 4 E.U.   Blood: SMALL / Protein: 100 / Nitrite: NEGATIVE   Leuk Esterase: MODERATE / RBC: 5-10 / WBC 5-10   Sq Epi: OCC / Non Sq Epi: x / Bacteria: MOD        RADIOLOGY & ADDITIONAL STUDIES: GENERAL SURGERY CONSULT NOTE  B team surgery  [id number]    HPI      PAST MEDICAL & SURGICAL HISTORY:  Adenocarcinoma: Periampulla   Hepatobiliary type  Pancreatic carcinoma: s/p whipple in 2015        Systemic:	[x ] Fever	[ x] Chills	[ ] Night sweats    [x ] Fatigue	[ ] Other  [] Cardiovascular:  [] Pulmonary:  [] Renal/Urologic:  [x] Gastrointestinal: epigastric abdominal pain, LLQ pain  [] Metabolic:  [] Neurologic:  [] Hematologic:  [] ENT:  [] Ophthalmologic:  [x] Musculoskeletal: LLQ and L thigh pain, weakness of L leg    MEDICATIONS  (STANDING):  Lovenox  levofloxacin finished today   tamsulosin  finasteride  dilaudid  senna  docusate  simethicone  nystatin  iron      Allergies  No Known Allergies    SOCIAL HISTORY:  Pt lives at home with family.     FAMILY HISTORY:  Family history of heart disease      Vital Signs Last 24 Hrs  T(C): 37.1 (2017 03:10), Max: 39.2 (2017 22:13)  T(F): 98.7 (2017 03:10), Max: 102.6 (2017 22:13)  HR: 84 (2017 03:10) (79 - 107)  BP: 110/65 (2017 03:10) (72/54 - 120/67)  BP(mean): 82 (2017 17:40) (82 - 82)  RR: 14 (2017 03:10) (11 - 21)  SpO2: 100% (2017 03:10) (98% - 100%)    PHYSICAL EXAM:    Constitutional: uncomfortable and ill-appearing, no acute distress    Eyes: icteric    ENMT: no rhinorrhea    Neck: supple    Respiratory: Clear bilaterally, respirations not labored, no retractions    Cardiovascular: RRR, NL S1S2    Gastrointestinal: Soft, ND, tender in epigastrum and LLQ, no swelling or ecchymosis    Genitourinary: Normal external genitalia    Extremities: No deformities    Vascular: Ext. warm, normal cap refill    Neurological: sensation and motor intact to all extremities    Skin: warm, dry, no rash    Lymph Nodes: no palpable adenopathy      LABS:                        6.9    8.61  )-----------( 418      ( 2017 14:27 )             19.9     11-    129<L>  |  93<L>  |  25<H>  ----------------------------<  101<H>  4.2   |  21<L>  |  0.96    Ca    8.0<L>      2017 14:27    TPro  5.8<L>  /  Alb  2.5<L>  /  TBili  9.4<H>  /  DBili  x   /  AST  127<H>  /  ALT  82<H>  /  AlkPhos  701<H>        Urinalysis Basic - ( 2017 19:42 )    Color: YELLOW / Appearance: HAZY / S.021 / pH: 6.0  Gluc: NEGATIVE / Ketone: NEGATIVE  / Bili: LARGE / Urobili: 4 E.U.   Blood: SMALL / Protein: 100 / Nitrite: NEGATIVE   Leuk Esterase: MODERATE / RBC: 5-10 / WBC 5-10   Sq Epi: OCC / Non Sq Epi: x / Bacteria: MOD        RADIOLOGY & ADDITIONAL STUDIES:  < from: CT Abdomen and Pelvis w/ IV Cont (17 @ 19:51) >  IMPRESSION:     Large hematoma centered within the left iliacus muscle, new since   11/15/2017.    Status post Whipple procedure with obstruction of the biliary pancreatic   limb, similar in configuration compared to the CT abdomen/pelvis dated   11/15/2017.    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    Slightly increased intrahepatic biliary dilatation.    < end of copied text > GENERAL SURGERY CONSULT NOTE  B team surgery  [id number]    HPI  73yo M with recent diagnosis of recurrent pancreatic ca s/p whipple in 2015, now presenting with an Iliac hemaotma.     PAST MEDICAL & SURGICAL HISTORY:  Adenocarcinoma: Periampulla   Hepatobiliary type  Pancreatic carcinoma: s/p whipple in         Systemic:	[x ] Fever	[ x] Chills	[ ] Night sweats    [x ] Fatigue	[ ] Other  [] Cardiovascular:  [] Pulmonary:  [] Renal/Urologic:  [x] Gastrointestinal: epigastric abdominal pain, LLQ pain  [] Metabolic:  [] Neurologic:  [] Hematologic:  [] ENT:  [] Ophthalmologic:  [x] Musculoskeletal: LLQ and L thigh pain, weakness of L leg    MEDICATIONS  (STANDING):  Lovenox  levofloxacin finished today   tamsulosin  finasteride  dilaudid  senna  docusate  simethicone  nystatin  iron      Allergies  No Known Allergies    SOCIAL HISTORY:  Pt lives at home with family.     FAMILY HISTORY:  Family history of heart disease      Vital Signs Last 24 Hrs  T(C): 37.1 (2017 03:10), Max: 39.2 (2017 22:13)  T(F): 98.7 (2017 03:10), Max: 102.6 (2017 22:13)  HR: 84 (2017 03:10) (79 - 107)  BP: 110/65 (2017 03:10) (72/54 - 120/67)  BP(mean): 82 (2017 17:40) (82 - 82)  RR: 14 (2017 03:10) (11 - 21)  SpO2: 100% (2017 03:10) (98% - 100%)    PHYSICAL EXAM:    Constitutional: uncomfortable and ill-appearing, no acute distress    Eyes: icteric    ENMT: no rhinorrhea    Neck: supple    Respiratory: Clear bilaterally, respirations not labored, no retractions    Cardiovascular: RRR, NL S1S2    Gastrointestinal: Soft, ND, tender in epigastrum and LLQ, no swelling or ecchymosis    Genitourinary: Normal external genitalia    Extremities: No deformities    Vascular: Ext. warm, normal cap refill    Neurological: sensation and motor intact to all extremities    Skin: warm, dry, no rash    Lymph Nodes: no palpable adenopathy      LABS:                        6.9    8.61  )-----------( 418      ( 2017 14:27 )             19.9         129<L>  |  93<L>  |  25<H>  ----------------------------<  101<H>  4.2   |  21<L>  |  0.96    Ca    8.0<L>      2017 14:27    TPro  5.8<L>  /  Alb  2.5<L>  /  TBili  9.4<H>  /  DBili  x   /  AST  127<H>  /  ALT  82<H>  /  AlkPhos  701<H>        Urinalysis Basic - ( 2017 19:42 )    Color: YELLOW / Appearance: HAZY / S.021 / pH: 6.0  Gluc: NEGATIVE / Ketone: NEGATIVE  / Bili: LARGE / Urobili: 4 E.U.   Blood: SMALL / Protein: 100 / Nitrite: NEGATIVE   Leuk Esterase: MODERATE / RBC: 5-10 / WBC 5-10   Sq Epi: OCC / Non Sq Epi: x / Bacteria: MOD        RADIOLOGY & ADDITIONAL STUDIES:  < from: CT Abdomen and Pelvis w/ IV Cont (17 @ 19:51) >  IMPRESSION:     Large hematoma centered within the left iliacus muscle, new since   11/15/2017.    Status post Whipple procedure with obstruction of the biliary pancreatic   limb, similar in configuration compared to the CT abdomen/pelvis dated   11/15/2017.    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    Slightly increased intrahepatic biliary dilatation.    < end of copied text > GENERAL SURGERY CONSULT NOTE  B team surgery  [id number]    HPI  73yo M with recent diagnosis of recurrent pancreatic ca s/p whipple in , now presenting with an Iliac hemaotma. 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 Since the day after discharge, he has had increasing LLE pain and weakness. He has not noticed any swelling or ecchymosis of the LLE. He has also had significant epigastric abdominal pain and decreased PO intake, 20 lb weight loss in 2 months, and jaundice.     PAST MEDICAL & SURGICAL HISTORY:  Adenocarcinoma: Periampulla   Hepatobiliary type  Pancreatic carcinoma: s/p whipple in         Systemic:	[x ] Fever	[ x] Chills	[ ] Night sweats    [x ] Fatigue	[ ] Other  [] Cardiovascular:  [] Pulmonary:  [] Renal/Urologic:  [x] Gastrointestinal: epigastric abdominal pain, LLQ pain  [] Metabolic:  [] Neurologic:  [] Hematologic:  [] ENT:  [] Ophthalmologic:  [x] Musculoskeletal: LLQ and L thigh pain, weakness of L leg    MEDICATIONS  (STANDING):  Lovenox  levofloxacin finished today   tamsulosin  finasteride  dilaudid  senna  docusate  simethicone  nystatin  iron      Allergies  No Known Allergies    SOCIAL HISTORY:  Pt lives at home with family.     FAMILY HISTORY:  Family history of heart disease      Vital Signs Last 24 Hrs  T(C): 37.1 (2017 03:10), Max: 39.2 (2017 22:13)  T(F): 98.7 (2017 03:10), Max: 102.6 (2017 22:13)  HR: 84 (2017 03:10) (79 - 107)  BP: 110/65 (2017 03:10) (72/54 - 120/67)  BP(mean): 82 (2017 17:40) (82 - 82)  RR: 14 (2017 03:10) (11 - 21)  SpO2: 100% (2017 03:10) (98% - 100%)    PHYSICAL EXAM:    Constitutional: uncomfortable and ill-appearing, no acute distress    Eyes: icteric    ENMT: no rhinorrhea    Neck: supple    Respiratory: Clear bilaterally, respirations not labored, no retractions    Cardiovascular: RRR, NL S1S2    Gastrointestinal: Soft, ND, tender in epigastrum and LLQ, no swelling or ecchymosis    Genitourinary: Normal external genitalia    Extremities: No deformities    Vascular: Ext. warm, normal cap refill    Neurological: sensation and motor intact to all extremities    Skin: warm, dry, no rash    Lymph Nodes: no palpable adenopathy      LABS:                        6.9    8.61  )-----------( 418      ( 2017 14:27 )             19.9         129<L>  |  93<L>  |  25<H>  ----------------------------<  101<H>  4.2   |  21<L>  |  0.96    Ca    8.0<L>      2017 14:27    TPro  5.8<L>  /  Alb  2.5<L>  /  TBili  9.4<H>  /  DBili  x   /  AST  127<H>  /  ALT  82<H>  /  AlkPhos  701<H>        Urinalysis Basic - ( 2017 19:42 )    Color: YELLOW / Appearance: HAZY / S.021 / pH: 6.0  Gluc: NEGATIVE / Ketone: NEGATIVE  / Bili: LARGE / Urobili: 4 E.U.   Blood: SMALL / Protein: 100 / Nitrite: NEGATIVE   Leuk Esterase: MODERATE / RBC: 5-10 / WBC 5-10   Sq Epi: OCC / Non Sq Epi: x / Bacteria: MOD        RADIOLOGY & ADDITIONAL STUDIES:  < from: CT Abdomen and Pelvis w/ IV Cont (17 @ 19:51) >  IMPRESSION:     Large hematoma centered within the left iliacus muscle, new since   11/15/2017.    Status post Whipple procedure with obstruction of the biliary pancreatic   limb, similar in configuration compared to the CT abdomen/pelvis dated   11/15/2017.    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    Slightly increased intrahepatic biliary dilatation.    < end of copied text >

## 2017-11-29 NOTE — DISCHARGE NOTE ADULT - MEDICATION SUMMARY - MEDICATIONS TO TAKE
I will START or STAY ON the medications listed below when I get home from the hospital:    finasteride 5 mg oral tablet  -- 1 tab(s) by mouth once a day  -- Indication: For BPH    metroNIDAZOLE 500 mg oral tablet  -- 1 tab(s) by mouth every 8 hours   -- Do not drink alcoholic beverages when taking this medication.  Finish all this medication unless otherwise directed by prescriber.  May discolor urine or feces.    -- Indication: For infection     acetaminophen 325 mg oral tablet  -- 2 tab(s) by mouth every 6 hours, As needed, For Temp greater than 38 C (100.4 F)  -- Indication: For Fevers    HYDROmorphone 2 mg oral tablet  -- 3 tab(s) by mouth every 3 hours, As needed, moderate or severe pain MDD:15 tabs daily  -- Indication: For Pain    morphine 15 mg/12 hr oral tablet, extended release  -- 1 tab(s) by mouth every 12 hours MDD:2 tabs daily  -- Indication: For Pain    tamsulosin 0.4 mg oral capsule  -- 1 cap(s) by mouth once a day  -- Indication: For BPH    ferrous sulfate 324 mg (65 mg elemental iron) oral tablet  -- 1 tab(s) by mouth once a day  -- Indication: For iron def    docusate sodium 100 mg oral capsule  -- 1 cap(s) by mouth 3 times a day  -- Indication: For Constipation     senna oral tablet  -- 2 tab(s) by mouth once a day (at bedtime)  -- Indication: For Constipation    simethicone 80 mg oral tablet, chewable  -- 1 tab(s) by mouth every 6 hours, As needed, Dyspepsia  -- Indication: For Abdominal pain     ciprofloxacin 500 mg oral tablet  -- 1 tab(s) by mouth 2 times a day   -- Avoid prolonged or excessive exposure to direct and/or artificial sunlight while taking this medication.  Check with your doctor before becoming pregnant.  Do not take dairy products, antacids, or iron preparations within one hour of this medication.  Finish all this medication unless otherwise directed by prescriber.  Medication should be taken with plenty of water.    -- Indication: For infection

## 2017-11-29 NOTE — PROGRESS NOTE ADULT - PROBLEM SELECTOR PLAN 1
-c/w vanc, zosyn, f/u blood and urine cultures   -s/p 3L bolus, will continue IVF at 75cc/hr  -ID c/s in setting of prior aeromonas hydrophila bacteremia -vanc, cefepime  -f/u blood and urine cultures   -s/p 3L bolus, will continue IVF at 75cc/hr

## 2017-11-29 NOTE — CONSULT NOTE ADULT - SUBJECTIVE AND OBJECTIVE BOX
Oncology Consult Note    HPI:  72M ([place] speaking) h/o pT3N0 (stage II) periampullary adenocarcinoma( 2015) s/p whipple/ and gemcitabine X 6 cycles, xeloda and RT (last chemo in 2016) and p/w fever, epigastric pain, and L hip pain. Patient recently admitted to [facility] on 11/2. Found to have right RP mass on CT consistent with recurrence of pancreatic CA c/b R. ureteral compression, hydronephrosis, sepsis 2/2 pyelo requiring urgent ureteral stent placement, and IVC tumor thrombus requiring lovenox BID. S/p RP mass biopsy on 11/9/17 and hospital course further complicated by e. coli bacteremia.     Over past several days, patient has had difficulty ambulating 2/2 left hip pain, worsening jaundice and abdominal pain, subjective fevers with dysuria. Continues to have adequate urine output. Appetite and PO intake has been poor.  No HA, CP, SOB, N/V/D but continues to have constipation. No melena or BRBPR. No sick contacts or recent travel. No recent falls or trauma. Continues to take lovenox BID for IVC tumor thrombus.     In ED initial vitals 100 (max 102.6), /56-78,  20, 98 on RA. Given total of 3L NS, vanc, zosyn, IV tylenol, dilaudid, morphine, 1U PRBC for hgb 6.9.     Allergies    No Known Allergies    Intolerances        MEDICATIONS  (STANDING):  cefepime  IVPB 2000 milliGRAM(s) IV Intermittent every 12 hours  docusate sodium 100 milliGRAM(s) Oral three times a day  finasteride 5 milliGRAM(s) Oral daily  lactobacillus acidophilus 1 Tablet(s) Oral every 12 hours  senna 2 Tablet(s) Oral at bedtime  sodium chloride 0.9%. 1000 milliLiter(s) (75 mL/Hr) IV Continuous <Continuous>  tamsulosin 0.4 milliGRAM(s) Oral at bedtime  vancomycin  IVPB 1000 milliGRAM(s) IV Intermittent every 12 hours    MEDICATIONS  (PRN):  HYDROmorphone  Injectable 0.5 milliGRAM(s) IV Push every 2 hours PRN moderate to severe pain  simethicone 80 milliGRAM(s) Chew every 6 hours PRN Dyspepsia      PAST MEDICAL & SURGICAL HISTORY:  Adenocarcinoma: Periampulla   Hepatobiliary type  Pancreatic carcinoma: s/p whipple in 2015  Jejunostomy tube in situ      FAMILY HISTORY:  No pertinent family history in first degree relatives      SOCIAL HISTORY: No EtOH, no tobacco    REVIEW OF SYSTEMS:    CONSTITUTIONAL: + weakness, weight loss, fevers, chills,   EYES/ENT: No visual changes;  No vertigo or throat pain   NECK: No pain or stiffness  RESPIRATORY: No cough, wheezing, hemoptysis; No shortness of breath  CARDIOVASCULAR: No chest pain or palpitations  GASTROINTESTINAL: severe abdominal pain, nausea, poor appetite and oral intake  GENITOURINARY: No dysuria, frequency or hematuria  NEUROLOGICAL: No numbness or weakness  SKIN: No itching, burning, rashes, or lesions   All other review of systems is negative unless indicated above.    Height (cm): 167.64 (11-29 @ 07:13)  Weight (kg): 56.1 (11-29 @ 07:13)  BMI (kg/m2): 20 (11-29 @ 07:13)  BSA (m2): 1.63 (11-29 @ 07:13)    T(F): 98.6 (11-29-17 @ 12:02), Max: 102.6 (11-28-17 @ 22:13)  HR: 88 (11-29-17 @ 12:02)  BP: 122/78 (11-29-17 @ 12:02)  RR: 18 (11-29-17 @ 12:02)  SpO2: 98% (11-29-17 @ 12:02)  Wt(kg): --    GENERAL: pt writhing in pain, thin-framed, appearing weak and tired  HEAD:  Atraumatic, Normocephalic  EYES: EOMI, PERRLA, conjunctiva and sclera clear  NECK: Supple, No JVD  CHEST/LUNG: Clear to auscultation bilaterally; No wheeze  HEART: Regular rate and rhythm; No murmurs, rubs, or gallops  ABDOMEN: Soft, Nontender, Nondistended; Bowel sounds present  EXTREMITIES:  2+ Peripheral Pulses, No clubbing, cyanosis, or edema  NEUROLOGY: non-focal  SKIN: No rashes or lesions                          8.2    11.96 )-----------( 431      ( 29 Nov 2017 06:10 )             24.5       11-29    129<L>  |  96<L>  |  17  ----------------------------<  90  4.5   |  18<L>  |  0.83    Ca    7.8<L>      29 Nov 2017 06:00  Phos  2.6     11-29  Mg     2.3     11-29    TPro  5.8<L>  /  Alb  2.4<L>  /  TBili  11.0<H>  /  DBili  x   /  AST  127<H>  /  ALT  85<H>  /  AlkPhos  691<H>  11-29      Phosphorus Level, Serum: 2.6 mg/dL (11-29 @ 06:00)  Magnesium, Serum: 2.3 mg/dL (11-29 @ 06:00)  Uric Acid, Serum: 3.1 mg/dL (11-28 @ 14:27)  Lactate Dehydrogenase, Serum: 190 U/L (11-28 @ 14:27)

## 2017-11-29 NOTE — H&P ADULT - PROBLEM SELECTOR PLAN 1
-c/w vanc, zosyn, f/u blood and urine cultures   -c/w IVF -c/w vanc, zosyn, f/u blood and urine cultures   -c/w IVF  -ID c/s

## 2017-11-29 NOTE — H&P ADULT - PROBLEM SELECTOR PLAN 2
-likely 2/2 left iliacus hematom-a  s/p 1U PRBC, post-transfusion labs pending   -hold lovenox   -trend H/H with goal hgb>7  -dilaudid prn for pain   -SCDs for ppx -likely 2/2 left iliacus hematoma  s/p 1U PRBC, post-transfusion labs pending   -hold lovenox   -trend H/H with goal hgb>7  -dilaudid prn for pain   -SCDs for ppx

## 2017-11-29 NOTE — PROGRESS NOTE ADULT - PROBLEM SELECTOR PLAN 5
-elevated Tbili, alk phos and AST, likely 2/2 congestive hepatopathy   -GI c/s in am for possible palliative stent placement -elevated Tbili, alk phos and AST, likely 2/2 congestive hepatopathy   -GI c/s for possible palliative stent placement

## 2017-11-29 NOTE — DISCHARGE NOTE ADULT - HOSPITAL COURSE
Hospital Course: 73yo M PMH metastatic pancreatic adenocarcinoma s/p whipple 2015, chemo, RT, who was recently found to have metastatic retroperitoneal mass and sepsis s/p biopsy. Patient also found to have IVC thrombus earlier in month and started on lovenox. Patient now presenting with worsened epigastric pain and left leg/hip pain x 1 week. Patient found to have large left iliacus hematoma and anemia. Patient transfused and lovenox stopped in setting of hematoma. Patient also found to have intrahepatic biliary ductal dilation worsened from prior. Oncology consulted and do not currently recommend chemotherapy. GI consulted for possible palliative biliary duct stent placement. Palliative care consulted for pain management and goals of care. Hospital Course: 73yo M PMH metastatic pancreatic adenocarcinoma s/p whipple 2015, chemo, RT, who was recently found to have metastatic retroperitoneal mass and sepsis s/p biopsy. Patient also found to have IVC thrombus earlier in month and started on lovenox. Patient now presenting with worsened epigastric pain and left leg/hip pain x 1 week. Patient found to have large left iliacus hematoma and anemia. Patient transfused and lovenox stopped in setting of hematoma. Patient also found to have intrahepatic biliary ductal dilation worsened from prior. Oncology consulted and do not currently recommend chemotherapy. GI consulted for possible palliative biliary duct stent placement. Palliative care consulted for pain management. 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 Surgery evaluated patient and ****. 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 Hospital Course: 71yo M PMH metastatic pancreatic adenocarcinoma s/p whipple 2015, chemo, RT, who was recently found to have metastatic retroperitoneal mass and sepsis s/p biopsy. Patient also found to have IVC thrombus earlier in month and started on lovenox. Patient now presenting with worsened epigastric pain and left leg/hip pain x 1 week. Patient found to have large left iliacus hematoma and anemia. Patient transfused and lovenox stopped in setting of hematoma. Patient also found to have intrahepatic biliary ductal dilation worsened from prior. Oncology consulted and do not currently recommend chemotherapy. GI consulted for possible palliative biliary duct stent placement. Palliative care consulted for pain management. 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 Surgery deemed patient not a surgical candidate. IR consulted for possible drain placement but refused because there is no safe window for access. 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 Given his poor prognosis, patient and family decided to pursue hospice care. Patient stable for discharge to home with hospice. Hospital Course: 71yo M PMH metastatic pancreatic adenocarcinoma s/p whipple 2015, chemo, RT, who was recently found to have metastatic retroperitoneal mass and sepsis s/p biopsy. Patient also found to have IVC thrombus earlier in month and started on lovenox. Patient now presenting with worsened epigastric pain and left leg/hip pain x 1 week. Patient found to have large left iliacus hematoma and anemia. Patient transfused and lovenox stopped in setting of hematoma. Patient also found to have intrahepatic biliary ductal dilation worsened from prior. Oncology consulted and do not currently recommend chemotherapy. GI consulted for possible palliative biliary duct stent placement. Palliative care consulted for pain management. 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 Surgery deemed patient not a surgical candidate. IR consulted for possible drain placement but refused because there is no safe window for access. 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 Given his poor prognosis, patient and family decided to pursue hospice care. Patient stable for discharge to home with hospice.    Patient will be discharged on Cipro and flagyl until 12/9 to complete his abx course.   In additional, lovenox will not be resumed at this time.   After extensive discussion with the family today, they decided to take the patient home with hospice. If he remains stable at home today, they plan to attempt to take him on a flight to the [place] in the next couple days.

## 2017-11-29 NOTE — DISCHARGE NOTE ADULT - MEDICATION SUMMARY - MEDICATIONS TO STOP TAKING
I will STOP taking the medications listed below when I get home from the hospital:    Lovenox 80 mg/0.8 mL injectable solution  -- 65 milligram(s) injectable 2 times a day   -- It is very important that you take or use this exactly as directed.  Do not skip doses or discontinue unless directed by your doctor.

## 2017-11-29 NOTE — H&P ADULT - NSHPREVIEWOFSYSTEMS_GEN_ALL_CORE
REVIEW OF SYSTEMS:    CONSTITUTIONAL: (+) fevers  EYES/ENT: No visual changes;  no throat pain   NECK: No pain or stiffness  RESPIRATORY: No cough, wheezing, hemoptysis; No shortness of breath  CARDIOVASCULAR: No chest pain or palpitations  GASTROINTESTINAL: (+) abn pain and constipation .  GENITOURINARY:(+) dysuria   NEUROLOGICAL: No numbness or weakness  SKIN: No itching, burning, rashes, or lesions   All other review of systems is negative unless indicated above. REVIEW OF SYSTEMS:    CONSTITUTIONAL: (+) fevers, jaundiced   EYES/ENT: No visual changes;  no throat pain   NECK: No pain or stiffness  RESPIRATORY: No cough, wheezing, hemoptysis; No shortness of breath  CARDIOVASCULAR: No chest pain or palpitations  GASTROINTESTINAL: (+) abn pain and constipation .  GENITOURINARY:(+) dysuria   NEUROLOGICAL: No numbness or weakness  SKIN: No itching, burning, rashes, or lesions   All other review of systems is negative unless indicated above.

## 2017-11-29 NOTE — H&P ADULT - PROBLEM SELECTOR PLAN 6
-onc c/s in am   -palliative followed patient on previous admission, reconsult for GOC and pain recs

## 2017-11-29 NOTE — DISCHARGE NOTE ADULT - PATIENT PORTAL LINK FT
“You can access the [facility] Patient Portal, offered by [facility], by registering with the following website: [email or web address]”

## 2017-11-29 NOTE — H&P ADULT - NSHPLABSRESULTS_GEN_ALL_CORE
6.9    8.61  )-----------( 418      ( 2017 14:27 )             19.9       -    129<L>  |  93<L>  |  25<H>  ----------------------------<  101<H>  4.2   |  21<L>  |  0.96    Ca    8.0<L>      2017 14:27    TPro  5.8<L>  /  Alb  2.5<L>  /  TBili  9.4<H>  /  DBili  x   /  AST  127<H>  /  ALT  82<H>  /  AlkPhos  701<H>                Urinalysis Basic - ( 2017 19:42 )    Color: YELLOW / Appearance: HAZY / S.021 / pH: 6.0  Gluc: NEGATIVE / Ketone: NEGATIVE  / Bili: LARGE / Urobili: 4 E.U.   Blood: SMALL / Protein: 100 / Nitrite: NEGATIVE   Leuk Esterase: MODERATE / RBC: 5-10 / WBC 5-10   Sq Epi: OCC / Non Sq Epi: x / Bacteria: MOD      Lactate Trend    Cultures: blood and urine cultures sent    Radiology:     EKG:  no admission EKG 6.9    8.61  )-----------( 418      ( 2017 14:27 )             19.9           129<L>  |  93<L>  |  25<H>  ----------------------------<  101<H>  4.2   |  21<L>  |  0.96    Ca    8.0<L>      2017 14:27    TPro  5.8<L>  /  Alb  2.5<L>  /  TBili  9.4<H>  /  DBili  x   /  AST  127<H>  /  ALT  82<H>  /  AlkPhos  701<H>                Urinalysis Basic - ( 2017 19:42 )    Color: YELLOW / Appearance: HAZY / S.021 / pH: 6.0  Gluc: NEGATIVE / Ketone: NEGATIVE  / Bili: LARGE / Urobili: 4 E.U.   Blood: SMALL / Protein: 100 / Nitrite: NEGATIVE   Leuk Esterase: MODERATE / RBC: 5-10 / WBC 5-10   Sq Epi: OCC / Non Sq Epi: x / Bacteria: MOD      Lactate Trend    Cultures: blood and urine cultures sent    Radiology:    < from: CT Abdomen and Pelvis w/ IV Cont (17 @ 19:51) >    IMPRESSION:     Large hematoma centered within the left iliacus muscle, new since   11/15/2017.    Status post Whipple procedure with obstruction of the biliary pancreatic   limb, similar in configuration compared to the CT abdomen/pelvis dated   11/15/2017.    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    Slightly increased intrahepatic biliary dilatation.      < from: US Abdomen Complete (17 @ 16:58) >    IMPRESSION:     Possibly increased intrahepatic and pancreatic ductal dilatation. Further   evaluation with contrast-enhanced CT may be considered to exclude distal   obstruction.    Debris in the urinary bladder.      EKG:  no admission EKG 6.9    8.61  )-----------( 418      ( 2017 14:27 )             19.9           129<L>  |  93<L>  |  25<H>  ----------------------------<  101<H>  4.2   |  21<L>  |  0.96    Ca    8.0<L>      2017 14:27    TPro  5.8<L>  /  Alb  2.5<L>  /  TBili  9.4<H>  /  DBili  x   /  AST  127<H>  /  ALT  82<H>  /  AlkPhos  701<H>              Urinalysis Basic - ( 2017 19:42 )    Color: YELLOW / Appearance: HAZY / S.021 / pH: 6.0  Gluc: NEGATIVE / Ketone: NEGATIVE  / Bili: LARGE / Urobili: 4 E.U.   Blood: SMALL / Protein: 100 / Nitrite: NEGATIVE   Leuk Esterase: MODERATE / RBC: 5-10 / WBC 5-10   Sq Epi: OCC / Non Sq Epi: x / Bacteria: MOD      Lactate Trend    Cultures: blood and urine cultures sent    Radiology:    < from: CT Abdomen and Pelvis w/ IV Cont (17 @ 19:51) >    IMPRESSION:     Large hematoma centered within the left iliacus muscle, new since   11/15/2017.    Status post Whipple procedure with obstruction of the biliary pancreatic   limb, similar in configuration compared to the CT abdomen/pelvis dated   11/15/2017.    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    Slightly increased intrahepatic biliary dilatation.      < from: US Abdomen Complete (17 @ 16:58) >    IMPRESSION:     Possibly increased intrahepatic and pancreatic ductal dilatation. Further   evaluation with contrast-enhanced CT may be considered to exclude distal   obstruction.    Debris in the urinary bladder.      EKG:  no admission EKG

## 2017-11-29 NOTE — CONSULT NOTE ADULT - SUBJECTIVE AND OBJECTIVE BOX
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 He presents with worsening jaundice in the setting of worsening abdominal pain, subjective fevers and dysuria. He reports decreased appetite and poor oral intake. He was recently d/c with a course of Levaquin. He denies melena, hematochezia, diarrhea. In the ED, he was hypotensive and februle to 102.6, and given 3L NS and IV Abx. He was also given 1 unit of pRBC. Heme Onc and Palliative Care were consulted.    PAST MEDICAL & SURGICAL HISTORY:  Adenocarcinoma: Periampulla   Hepatobiliary type  Pancreatic carcinoma: s/p whipple in 2015  Jejunostomy tube in situ    Review of Systems: Negative except as per HPI.    MEDICATIONS  (STANDING):  cefepime  IVPB 2000 milliGRAM(s) IV Intermittent every 12 hours  docusate sodium 100 milliGRAM(s) Oral three times a day  finasteride 5 milliGRAM(s) Oral daily  lactobacillus acidophilus 1 Tablet(s) Oral every 12 hours  senna 2 Tablet(s) Oral at bedtime  sodium chloride 0.9%. 1000 milliLiter(s) (75 mL/Hr) IV Continuous <Continuous>  tamsulosin 0.4 milliGRAM(s) Oral at bedtime  vancomycin  IVPB 1000 milliGRAM(s) IV Intermittent every 12 hours    MEDICATIONS  (PRN):  HYDROmorphone  Injectable 1 milliGRAM(s) IV Push every 3 hours PRN Moderate to severe pain  simethicone 80 milliGRAM(s) Chew every 6 hours PRN Dyspepsia    ALLERGIES: NKDA    SOCIAL HISTORY:  - Alcohol: denies  - Recreational drug use: denies    FAMILY HISTORY:  No pertinent family history in first degree relatives  Vital Signs Last 24 Hrs  T(C): 37.5 (29 Nov 2017 14:45), Max: 39.2 (28 Nov 2017 22:13)  T(F): 99.5 (29 Nov 2017 14:45), Max: 102.6 (28 Nov 2017 22:13)  HR: 99 (29 Nov 2017 14:45) (79 - 107)  BP: 103/66 (29 Nov 2017 14:45) (72/54 - 122/78)  BP(mean): 82 (28 Nov 2017 17:40) (82 - 82)  RR: 18 (29 Nov 2017 14:45) (11 - 21)  SpO2: 100% (29 Nov 2017 14:45) (98% - 100%)    PHYSICAL EXAM:  Constitutional: no acute distress  Eyes: icterus  Neck: no masses, no LAD  Respiratory: normal inspiratory effort; no wheezing or crackles  Cardiovascular: RRR, normal S1/S2, no murmurs/rubs/gallops  Gastrointestinal: soft, nondistended, nontender, +BS  Extremities: no LE edema  Neurological: AAOx3, no asterixis  Skin: no rashes, bruises, petechiae    LABS:                        8.2    11.96 )-----------( 431      ( 29 Nov 2017 06:10 )             24.5     129<L>  |  96<L>  |  17  ----------------------------<  90  4.5   |  18<L>  |  0.83    Ca    7.8<L>      29 Nov 2017 06:00  Phos  2.6     11-29  Mg     2.3     11-29    TPro  5.8<L>  /  Alb  2.4<L>  /  TBili  11.0<H>  /  DBili  x   /  AST  127<H>  /  ALT  85<H>  /  AlkPhos  691<H>  11-29  PT/INR - ( 29 Nov 2017 06:10 )   PT: 14.8 SEC;   INR: 1.31     PTT - ( 29 Nov 2017 06:10 )  PTT:28.6 SEC  LIVER FUNCTIONS - ( 29 Nov 2017 06:00 )  Alb: 2.4 g/dL / Pro: 5.8 g/dL / ALK PHOS: 691 u/L / ALT: 85 u/L / AST: 127 u/L / GGT: x

## 2017-11-29 NOTE — CONSULT NOTE ADULT - ATTENDING COMMENTS
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 He presents with worsening jaundice in the setting of worsening abdominal pain, subjective fevers and dysuria. He reports decreased appetite and poor oral intake. He was recently d/c with a course of Levaquin. He denies melena, hematochezia, diarrhea. In the ED, he was hypotensive and februle to 102.6, and given 3L NS and IV Abx. He was also given 1 unit of pRBC. Heme Onc and Palliative Care were consulted.      Plan: Will attempt ERCP tomorrow.
patient seen and examined at bedside, Patient with pancreatic ca with  enlarging tumor with elevated alk phos and ast and alt who presents  with increasing jaundice.  Pt just returned from [place] with  nausea and weakness . IN the er episode of hypotension, pt s/p 3 liters   ivf, Pt received tylenol for temp 102 and vanco/zosyn.    /65 p98  RR24  jaundice, lungs clear, heart s1s2 , abdomen benign.  -Jaundice with pancreatic ca, hypotension responding to ivfluid.  -r/o sepsis , pt does not require icu level care, please reconsult if condition changes.
I have reviewed the history, pertinent labs and imaging, and discussed the care with the consult resident.  Agree with her assessment and recommendations.
Patient seen and examined.  Agree with NP note.

## 2017-11-29 NOTE — H&P ADULT - NSHPPHYSICALEXAM_GEN_ALL_CORE
Vital Signs Last 24 Hrs  T(C): 37.1 (11-29-17 @ 03:10), Max: 39.2 (11-28-17 @ 22:13)  T(F): 98.7 (11-29-17 @ 03:10), Max: 102.6 (11-28-17 @ 22:13)  HR: 84 (11-29-17 @ 03:10) (79 - 107)  BP: 110/65 (11-29-17 @ 03:10) (72/54 - 120/67)  BP(mean): 82 (11-28-17 @ 17:40) (82 - 82)  RR: 14 (11-29-17 @ 03:10) (11 - 21)  SpO2: 100% (11-29-17 @ 03:10) (98% - 100%)    GENERAL: NAD  HEENT: EOMI, MMM, no oropharyngeal lesions or erythema appreciated  Pulm: normal work of breathing, CTABL  CV: RRR, S1&S2+, no m/r/g appreciated  ABDOMEN: soft, tender to palpation   MSK: nl ROM  EXTREMITIES:  no appreciable edema in b/l LE  Neuro: A&Ox3, no focal deficits  SKIN: warm and dry, no visible rash Vital Signs Last 24 Hrs  T(C): 37.1 (11-29-17 @ 03:10), Max: 39.2 (11-28-17 @ 22:13)  T(F): 98.7 (11-29-17 @ 03:10), Max: 102.6 (11-28-17 @ 22:13)  HR: 84 (11-29-17 @ 03:10) (79 - 107)  BP: 110/65 (11-29-17 @ 03:10) (72/54 - 120/67)  BP(mean): 82 (11-28-17 @ 17:40) (82 - 82)  RR: 14 (11-29-17 @ 03:10) (11 - 21)  SpO2: 100% (11-29-17 @ 03:10) (98% - 100%)    GENERAL: NAD, (+) jaundice  HEENT: EOMI, MMM, no oropharyngeal lesions or erythema appreciated  Pulm: normal work of breathing, CTABL  CV: RRR, S1&S2+, no m/r/g appreciated  ABDOMEN: soft, tender to palpation   MSK: limited ROM LLE 2/2 pain   EXTREMITIES:  no appreciable edema in b/l LE  Neuro: A&Ox3, no focal deficits  SKIN: warm and dry, no visible rash

## 2017-11-29 NOTE — CONSULT NOTE ADULT - SUBJECTIVE AND OBJECTIVE BOX
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 Patient recently admitted to [facility] on . Found to have right RP mass on CT likely recurrence of pancreatic CA vs. new malignancy c/b R. ureteral compression, hydronephrosis, sepsis 2/2 pyelo requiring urgent ureteral stent placement, and IVC tumor thrombus requiring lovenox BID. S/p RP mass biopsy on 17 and hospital course further complicated by e. coli bacteremia. Patient discharged home to complete course of levaquin.     Over past several days, patient has had difficulty ambulating 2/2 left hip pain, worsening jaundice and abdominal pain, subjective fevers with dysuria. Continues to have adequate urine output. Appetite and PO intake has been poor.  No HA, CP, SOB, N/V/D but continues to have constipation. No melena or BRBPR. No sick contacts or recent travel. No recent falls or trauma. Continues to take lovenox BID for IVC tumor thrombus.     In ED initial vitals 100 (max 102.6), /56-78,  20, 98 on RA. Given total of 3L NS, vanc, zosyn, IV tylenol, dilaudid, morphine, 1U PRBC for hgb 6.9. (2017 05:04)      PERTINENT PMH REVIEWED:  [ ] YES [ ] NO           SOCIAL HISTORY:  Significant other/partner:  [ ] YES  [ ] NO            Children:  [ ] YES  [ ] NO                   Jehovah's witness/Spirituality:  Substance hx:  [ ] YES   [ ] NO           Tobacco hx:  [ ] YES  [ ] NO             Alcohol hx: [ ] YES  [ ] NO        Home Opioid hx:  [ ] YES  [ ] NO   Living Situation: [ ] Home  [ ] Long term care  [ ] Rehab    FAMILY HISTORY:  No pertinent family history in first degree relatives    [ ] Family history non contributory     BASELINE ADLs (prior to admission):  Independent [ ] moderately [ ] fully   Dependent   [ ] moderately [ ] fully    Code Status:                      MOLST  [ ] YES [ ] NO    Living Will  [ ] YES [ ] NO    Health Care Proxy [ ] YES  [ ] NO      [ ] Surrogate  [ ] HCP  [ ] Guardian:                                                                  Phone#:    Allergies    No Known Allergies    Intolerances        MEDICATIONS  (STANDING):  docusate sodium 100 milliGRAM(s) Oral three times a day  finasteride 5 milliGRAM(s) Oral daily  lactobacillus acidophilus 1 Tablet(s) Oral every 12 hours  piperacillin/tazobactam IVPB. 3.375 Gram(s) IV Intermittent every 8 hours  senna 2 Tablet(s) Oral at bedtime  sodium chloride 0.9%. 1000 milliLiter(s) (75 mL/Hr) IV Continuous <Continuous>  tamsulosin 0.4 milliGRAM(s) Oral at bedtime  vancomycin  IVPB 1000 milliGRAM(s) IV Intermittent every 12 hours    MEDICATIONS  (PRN):  HYDROmorphone  Injectable 1 milliGRAM(s) IV Push every 3 hours PRN moderate to severe pain  simethicone 80 milliGRAM(s) Chew every 6 hours PRN Dyspepsia      PRESENT SYMPTOMS:  Source: [ ] Patient   [ ] Family   [ ] Team     Pain: [ ] YES [ ] NO  Onset:                    Location:                          Duration:                 Character:            Aggravating factors:                        Relieving factors:    Radiation:              Timing:                             Severity:      Dyspnea: [ ] YES [ ] NO - Mild [ ]  Moderate [ ]  Severe [ ]    Anxiety: [ ] YES [ ] NO  Fatigue: [ ] YES [ ] NO   Nausea: [ ] YES [ ] NO  Loss of appetite: [ ] YES [ ] NO   Constipation: [ ] YES [ ] NO     Other Symptoms:  [ ] All other review of systems negative   [ ] Unable to obtain due to poor mentation     Does patient meet criteria for Severe Protein Calorie Malnutrition?  Yes [ ]  No [ ]  PPSV 30% or below [ ]  Anasarca [ ]  Albumin < 2 [ ] Catabolic State [ ] Poor nutritional intake [ ] Significant weight loss [ ]      Palliative Performance Status Version 2:         %  ECOG -        Vital Signs Last 24 Hrs  T(C): 37.3 (2017 07:13), Max: 39.2 (2017 22:13)  T(F): 99.1 (2017 07:13), Max: 102.6 (2017 22:13)  HR: 89 (2017 07:13) (79 - 107)  BP: 113/77 (2017 07:13) (72/54 - 120/67)  BP(mean): 82 (2017 17:40) (82 - 82)  RR: 18 (2017 07:13) (11 - 21)  SpO2: 100% (2017 07:13) (98% - 100%)    Physical Exam:    General: [ ] Alert,  A&O x     [ ] lethargic   [ ] Agitated   [ ] Cachexia   HEENT: [ ] Normal   [ ] Dry mouth   [ ] ET Tube    [ ] Trach   Lungs: [ ] Clear [ ] Rhonchi  [ ] Crackles [ ] Wheezing [ ] Tachypnea  [ ] Audible excessive secretions    Cardiovascular:  [ ] Regular rate and rhythm  [ ] Irregular [ ] Tachycardia   [ ] Bradycardia   Abdomen: [ ] Soft  [ ] Distended  [ ] +BS  [ ] Non tender [ ] Tender  [ ]PEG   [ ] NGT   Last BM:     Genitourinary: [ ] Normal [ ] Incontinent   [ ] Oliguria/Anuria   [ ] Ludwig  Musculoskeletal:  [ ] Normal   [ ] Generalized weakness  [ ] Bedbound  [ ] Edema   Neurological: [ ] No focal deficits  [ ] Cognitive impairment     Skin: [ ] Normal   [ ] Pressure ulcers     LABS:                        8.2    11.96 )-----------( 431      ( 2017 06:10 )             24.5         129<L>  |  96<L>  |  17  ----------------------------<  90  4.5   |  18<L>  |  0.83    Ca    7.8<L>      2017 06:00  Phos  2.6       Mg     2.3         TPro  5.8<L>  /  Alb  2.4<L>  /  TBili  11.0<H>  /  DBili  x   /  AST  127<H>  /  ALT  85<H>  /  AlkPhos  691<H>      PT/INR - ( 2017 06:10 )   PT: 14.8 SEC;   INR: 1.31          PTT - ( 2017 06:10 )  PTT:28.6 SEC  Urinalysis Basic - ( 2017 19:42 )    Color: YELLOW / Appearance: HAZY / S.021 / pH: 6.0  Gluc: NEGATIVE / Ketone: NEGATIVE  / Bili: LARGE / Urobili: 4 E.U.   Blood: SMALL / Protein: 100 / Nitrite: NEGATIVE   Leuk Esterase: MODERATE / RBC: 5-10 / WBC 5-10   Sq Epi: OCC / Non Sq Epi: x / Bacteria: MOD      I&O's Summary      RADIOLOGY & ADDITIONAL STUDIES:    Referrals:  Hospice [ ]   Chaplaincy [ ]    Child Life [ ]   Social Work [ ]   Case Management [ ]   Holistic Therapy [ ] HPI:  72 year old man who is Icelandic speaking  who has a periampullary adenocarcinoma( ) s/p whipple/ and gemcitabine X 6 cycles, xeloda and RT (last chemo in ) and p/w fever, abn and L hip pain. Patient was recently admitted to [facility] on  and was found to have right RP mass on CT likely recurrence of pancreatic CA vs. new malignancy c/b R. ureteral compression, hydronephrosis, sepsis 2/2 pyelo requiring urgent ureteral stent placement, and IVC tumor thrombus requiring lovenox BID. S/p RP mass biopsy on 17 and hospital course further complicated by e. coli bacteremia. Since his discharged prior to this admission patient has been having difficulty ambulating secondary to his pain.  phone used (ID# [phone]), where patient allowed his daughter to be used as a . He stated that the pain has been going on for about a month and it is relieved with his Dilaudid he was taking at home.       PERTINENT PMH REVIEWED:  [x ] YES [ ] NO           SOCIAL HISTORY:  Significant other/partner:  [x ] YES  [ ] NO            Children:  [x ] YES  [ ] NO                   Confucianism/Spirituality:  Substance hx:  [ ] YES   [x ] NO           Tobacco hx:  [x ] YES  [ ] NO             Alcohol hx: [ ] YES  [ x] NO        Home Opioid hx:  [x ] YES  [ ] NO  - Dilaudid 2mg   Living Situation: [ x] Home  [ ] Long term care  [ ] Rehab    FAMILY HISTORY:  No pertinent family history in first degree relatives    [ ] Family history non contributory     BASELINE ADLs (prior to admission):  Independent [ x] moderately [ ] fully   Dependent   [ ] moderately [ ] fully    Code Status:                      MOLST  [ ] YES [x ] NO    Living Will  [ ] YES [x ] NO    Health Care Proxy [ ] YES  [ x] NO      [ ] Surrogate  [ ] HCP  [ ] Guardian:    Harsh Quevedo                                                          Phone#: [phone]    Allergies    No Known Allergies    Intolerances        MEDICATIONS  (STANDING):  docusate sodium 100 milliGRAM(s) Oral three times a day  finasteride 5 milliGRAM(s) Oral daily  lactobacillus acidophilus 1 Tablet(s) Oral every 12 hours  piperacillin/tazobactam IVPB. 3.375 Gram(s) IV Intermittent every 8 hours  senna 2 Tablet(s) Oral at bedtime  sodium chloride 0.9%. 1000 milliLiter(s) (75 mL/Hr) IV Continuous <Continuous>  tamsulosin 0.4 milliGRAM(s) Oral at bedtime  vancomycin  IVPB 1000 milliGRAM(s) IV Intermittent every 12 hours    MEDICATIONS  (PRN):  HYDROmorphone  Injectable 1 milliGRAM(s) IV Push every 3 hours PRN moderate to severe pain  simethicone 80 milliGRAM(s) Chew every 6 hours PRN Dyspepsia      PRESENT SYMPTOMS:  Source: [x ] Patient   [ ] Family   [ ] Team     Pain: [ x] YES [ ] NO  Onset:   1 month ago             Location:      Abdomen and left hip                    Duration:  Constant               Character: sharp and aching           Aggravating factors:    palpation                     Relieving factors: Dilaudid    Radiation: non             Timing:                             Severity:  8/10    Dyspnea: [ ] YES [x ] NO - Mild [ ]  Moderate [ ]  Severe [ ]    Anxiety: [ ] YES [x ] NO  Fatigue: [ ] YES [x ] NO   Nausea: [ ] YES [x ] NO  Loss of appetite: [ ] YES [x ] NO   Constipation: [x ] YES [ ] NO     Other Symptoms:  [ x] All other review of systems negative   [ ] Unable to obtain due to poor mentation     Does patient meet criteria for Severe Protein Calorie Malnutrition?  Yes [ ]  No [ ]  PPSV 30% or below [ ]  Anasarca [ ]  Albumin < 2 [ ] Catabolic State [ ] Poor nutritional intake [ ] Significant weight loss [ ]      Palliative Performance Status Version 2:      50   %  ECOG - 3       Vital Signs Last 24 Hrs  T(C): 37.3 (2017 07:13), Max: 39.2 (2017 22:13)  T(F): 99.1 (2017 07:13), Max: 102.6 (2017 22:13)  HR: 89 (2017 07:13) (79 - 107)  BP: 113/77 (2017 07:13) (72/54 - 120/67)  BP(mean): 82 (2017 17:40) (82 - 82)  RR: 18 (2017 07:13) (11 - 21)  SpO2: 100% (2017 07:13) (98% - 100%)    Physical Exam:    General: [ x] Alert,  A&O x3     [ ] lethargic   [ ] Agitated   [ ] Cachexia   HEENT: [ ] Normal   [ x] Dry mouth   [ ] ET Tube    [ ] Trach   Lungs: [x ] Clear [ ] Rhonchi  [ ] Crackles [ ] Wheezing [ ] Tachypnea  [ ] Audible excessive secretions    Cardiovascular:  [ x] Regular rate and rhythm  [ ] Irregular [ ] Tachycardia   [ ] Bradycardia   Abdomen: [ x] Soft  [ ] Distended  [ ] +BS  [ ] Non tender [x ] Tender to palpation in epigastric area  [ ]PEG   [ ] NGT   Last BM:     Genitourinary: [ ] Normal [ ] Incontinent   [ ] Oliguria/Anuria   [ ] Ludwig  Musculoskeletal:  [ ] Normal   [ ] Generalized weakness  [ ] Bedbound  [ ] Edema   Neurological: [ ] No focal deficits  [ ] Cognitive impairment     Skin: [ ] Normal   [ ] Pressure ulcers     LABS:                        8.2    11.96 )-----------( 431      ( 2017 06:10 )             24.5     11-    129<L>  |  96<L>  |  17  ----------------------------<  90  4.5   |  18<L>  |  0.83    Ca    7.8<L>      2017 06:00  Phos  2.6       Mg     2.3         TPro  5.8<L>  /  Alb  2.4<L>  /  TBili  11.0<H>  /  DBili  x   /  AST  127<H>  /  ALT  85<H>  /  AlkPhos  691<H>      PT/INR - ( 2017 06:10 )   PT: 14.8 SEC;   INR: 1.31          PTT - ( 2017 06:10 )  PTT:28.6 SEC  Urinalysis Basic - ( 2017 19:42 )    Color: YELLOW / Appearance: HAZY / S.021 / pH: 6.0  Gluc: NEGATIVE / Ketone: NEGATIVE  / Bili: LARGE / Urobili: 4 E.U.   Blood: SMALL / Protein: 100 / Nitrite: NEGATIVE   Leuk Esterase: MODERATE / RBC: 5-10 / WBC 5-10   Sq Epi: OCC / Non Sq Epi: x / Bacteria: MOD      I&O's Summary      RADIOLOGY & ADDITIONAL STUDIES:    Referrals:  Hospice [ ]   Chaplaincy [ ]    Child Life [ ]   Social Work [ ]   Case Management [ ]   Holistic Therapy [ ] HPI:  72 year old man who is Persian speaking  who has a periampullary adenocarcinoma( ) s/p whipple/ and gemcitabine X 6 cycles, xeloda and RT (last chemo in ) and p/w fever, abn and L hip pain. Patient was recently admitted to [facility] on  and was found to have right RP mass on CT likely recurrence of pancreatic CA vs. new malignancy c/b R. ureteral compression, hydronephrosis, sepsis 2/2 pyelo requiring urgent ureteral stent placement, and IVC tumor thrombus requiring lovenox BID. S/p RP mass biopsy on 17 and hospital course further complicated by e. coli bacteremia. Since his discharged prior to this admission patient has been having difficulty ambulating secondary to his pain.  phone used (ID# [phone]), where patient allowed his daughter to be used as a . He stated that the pain has been going on for about a month and it is relieved with his Dilaudid he was taking at home.       PERTINENT PMH REVIEWED:  [x ] YES [ ] NO           SOCIAL HISTORY:  Significant other/partner:  [x ] YES  [ ] NO            Children:  [x ] YES  [ ] NO                   Shinto/Spirituality:  Substance hx:  [ ] YES   [x ] NO           Tobacco hx:  [x ] YES  [ ] NO             Alcohol hx: [ ] YES  [ x] NO        Home Opioid hx:  [x ] YES  [ ] NO  - Dilaudid 2mg   Living Situation: [ x] Home  [ ] Long term care  [ ] Rehab    FAMILY HISTORY:  No pertinent family history in first degree relatives    [ ] Family history non contributory     BASELINE ADLs (prior to admission):  Independent [ x] moderately [ ] fully   Dependent   [ ] moderately [ ] fully    Code Status:                      MOLST  [ ] YES [x ] NO    Living Will  [ ] YES [x ] NO    Health Care Proxy [ ] YES  [ x] NO      [ ] Surrogate  [ ] HCP  [ ] Guardian:    Harsh Quevedo                                                          Phone#: [phone]    Allergies    No Known Allergies    Intolerances        MEDICATIONS  (STANDING):  docusate sodium 100 milliGRAM(s) Oral three times a day  finasteride 5 milliGRAM(s) Oral daily  lactobacillus acidophilus 1 Tablet(s) Oral every 12 hours  piperacillin/tazobactam IVPB. 3.375 Gram(s) IV Intermittent every 8 hours  senna 2 Tablet(s) Oral at bedtime  sodium chloride 0.9%. 1000 milliLiter(s) (75 mL/Hr) IV Continuous <Continuous>  tamsulosin 0.4 milliGRAM(s) Oral at bedtime  vancomycin  IVPB 1000 milliGRAM(s) IV Intermittent every 12 hours    MEDICATIONS  (PRN):  HYDROmorphone  Injectable 1 milliGRAM(s) IV Push every 3 hours PRN moderate to severe pain  simethicone 80 milliGRAM(s) Chew every 6 hours PRN Dyspepsia      PRESENT SYMPTOMS:  Source: [x ] Patient   [ ] Family   [ ] Team     Pain: [ x] YES [ ] NO  Onset:   1 month ago             Location:      Abdomen and left hip                    Duration:  Constant               Character: sharp and aching           Aggravating factors:    palpation                     Relieving factors: Dilaudid    Radiation: non             Timing:                             Severity:  8/10    Dyspnea: [ ] YES [x ] NO - Mild [ ]  Moderate [ ]  Severe [ ]    Anxiety: [ ] YES [x ] NO  Fatigue: [ ] YES [x ] NO   Nausea: [ ] YES [x ] NO  Loss of appetite: [ ] YES [x ] NO   Constipation: [x ] YES [ ] NO     Other Symptoms:  [ x] All other review of systems negative   [ ] Unable to obtain due to poor mentation     Does patient meet criteria for Severe Protein Calorie Malnutrition?  Yes [ ]  No [ ]  PPSV 30% or below [ ]  Anasarca [ ]  Albumin < 2 [ ] Catabolic State [ ] Poor nutritional intake [ ] Significant weight loss [ ]      Palliative Performance Status Version 2:      50   %  ECOG - 3       Vital Signs Last 24 Hrs  T(C): 37.3 (2017 07:13), Max: 39.2 (2017 22:13)  T(F): 99.1 (2017 07:13), Max: 102.6 (2017 22:13)  HR: 89 (2017 07:13) (79 - 107)  BP: 113/77 (2017 07:13) (72/54 - 120/67)  BP(mean): 82 (2017 17:40) (82 - 82)  RR: 18 (2017 07:13) (11 - 21)  SpO2: 100% (2017 07:13) (98% - 100%)    Physical Exam:    General: [ x] Alert,  A&O x3     [ ] lethargic   [ ] Agitated   [ ] Cachexia   HEENT: [ ] Normal   [ x] Dry mouth   [ ] ET Tube    [ ] Trach   Lungs: [x ] Clear [ ] Rhonchi  [ ] Crackles [ ] Wheezing [ ] Tachypnea  [ ] Audible excessive secretions    Cardiovascular:  [ x] Regular rate and rhythm  [ ] Irregular [ ] Tachycardia   [ ] Bradycardia   Abdomen: [ x] Soft  [ ] Distended  [ ] +BS  [ ] Non tender [x ] Tender to palpation in epigastric area  [ ]PEG   [ ] NGT   Last BM:     Genitourinary: [ ] Normal [ ] Incontinent   [ ] Oliguria/Anuria   [ ] Ludwig  Musculoskeletal:  [ ] Normal   [ ] Generalized weakness  [ ] Bedbound  [ ] Edema   Neurological: [ ] No focal deficits  [ ] Cognitive impairment     Skin: [ ] Normal   [ ] Pressure ulcers     LABS:                        8.2    11.96 )-----------( 431      ( 2017 06:10 )             24.5     11-    129<L>  |  96<L>  |  17  ----------------------------<  90  4.5   |  18<L>  |  0.83    Ca    7.8<L>      2017 06:00  Phos  2.6       Mg     2.3         TPro  5.8<L>  /  Alb  2.4<L>  /  TBili  11.0<H>  /  DBili  x   /  AST  127<H>  /  ALT  85<H>  /  AlkPhos  691<H>      PT/INR - ( 2017 06:10 )   PT: 14.8 SEC;   INR: 1.31          PTT - ( 2017 06:10 )  PTT:28.6 SEC  Urinalysis Basic - ( 2017 19:42 )    Color: YELLOW / Appearance: HAZY / S.021 / pH: 6.0  Gluc: NEGATIVE / Ketone: NEGATIVE  / Bili: LARGE / Urobili: 4 E.U.   Blood: SMALL / Protein: 100 / Nitrite: NEGATIVE   Leuk Esterase: MODERATE / RBC: 5-10 / WBC 5-10   Sq Epi: OCC / Non Sq Epi: x / Bacteria: MOD      I&O's Summary      RADIOLOGY & ADDITIONAL STUDIES:    EXAM:  CT ABDOMEN AND PELVIS IC    PROCEDURE DATE:  2017     IMPRESSION:   Large hematoma centered within the left iliacus muscle, new since   11/15/2017.    Status post Whipple procedure with obstruction of the biliary pancreatic   limb, similar in configuration compared to the CT abdomen/pelvis dated   11/15/2017.    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    Slightly increased intrahepatic biliary dilatation.      EXAM:  RAD CHEST AP PA 1 VIEW    PROCEDURE DATE:  2017     IMPRESSION:   Clear lungs.        Referrals:  Hospice [ ]   Chaplaincy [ ]    Child Life [ ]   Social Work [ ]   Case Management [ ]   Holistic Therapy [ ]

## 2017-11-29 NOTE — PROGRESS NOTE ADULT - SUBJECTIVE AND OBJECTIVE BOX
Patient is a 72y old  Male who presents with a chief complaint of fever/ abd pain (2017 08:04)        SUBJECTIVE / OVERNIGHT EVENTS: Admitted to floor. Continues to have abdominal, L leg pain and dysuria. No SOB, chest pain, cough, nausea, vomiting, diarrhea, numbness, tingling.      MEDICATIONS  (STANDING):  docusate sodium 100 milliGRAM(s) Oral three times a day  finasteride 5 milliGRAM(s) Oral daily  lactobacillus acidophilus 1 Tablet(s) Oral every 12 hours  piperacillin/tazobactam IVPB. 3.375 Gram(s) IV Intermittent every 8 hours  senna 2 Tablet(s) Oral at bedtime  sodium chloride 0.9%. 1000 milliLiter(s) (75 mL/Hr) IV Continuous <Continuous>  tamsulosin 0.4 milliGRAM(s) Oral at bedtime  vancomycin  IVPB 1000 milliGRAM(s) IV Intermittent every 12 hours    MEDICATIONS  (PRN):  HYDROmorphone  Injectable 1 milliGRAM(s) IV Push every 3 hours PRN moderate to severe pain  simethicone 80 milliGRAM(s) Chew every 6 hours PRN Dyspepsia        CAPILLARY BLOOD GLUCOSE        I&O's Summary      PHYSICAL EXAM  GENERAL: Curled up in bed appearing uncomfortable, responds to questions appropriately  HEAD:  Atraumatic, Normocephalic  EYES: PERRLA, conjunctiva and sclera clear  CHEST/LUNG: Clear to auscultation bilaterally; No wheeze or crackles  HEART: Regular rate and rhythm; No murmurs, rubs, or gallops  ABDOMEN: Soft, tender in epigastrium, mild voluntary guarding, Nondistended; Bowel sounds present; No CVAT  EXTREMITIES:  2+ Peripheral Pulses, No clubbing, cyanosis, or edema; FROM R leg, limited ROM L leg 2/2 pain, no sensory changes  NEURO: AAOx3, moving all extremities  SKIN: No rashes or lesions; No bruising or s/s of bleeding    LABS:                        8.2    11.96 )-----------( 431      ( 2017 06:10 )             24.5     11-29    129<L>  |  96<L>  |  17  ----------------------------<  90  4.5   |  18<L>  |  0.83    Ca    7.8<L>      2017 06:00  Phos  2.6       Mg     2.3         TPro  5.8<L>  /  Alb  2.4<L>  /  TBili  11.0<H>  /  DBili  x   /  AST  127<H>  /  ALT  85<H>  /  AlkPhos  691<H>      PT/INR - ( 2017 06:10 )   PT: 14.8 SEC;   INR: 1.31          PTT - ( 2017 06:10 )  PTT:28.6 SEC      Urinalysis Basic - ( 2017 19:42 )    Color: YELLOW / Appearance: HAZY / S.021 / pH: 6.0  Gluc: NEGATIVE / Ketone: NEGATIVE  / Bili: LARGE / Urobili: 4 E.U.   Blood: SMALL / Protein: 100 / Nitrite: NEGATIVE   Leuk Esterase: MODERATE / RBC: 5-10 / WBC 5-10   Sq Epi: OCC / Non Sq Epi: x / Bacteria: MOD        RADIOLOGY & ADDITIONAL TESTS:        Nazia Preciado MD (PGY-1)  #[id number]/[phone]

## 2017-11-29 NOTE — DISCHARGE NOTE ADULT - CONDITIONS AT DISCHARGE
patient alert and clinically stable. patient with pain, medication management  provided. patient vss and afebrile. patient is being discharged to home with hospice care provided.  patient safety maintained, no sxs of acute distress noted. will continue to monitor.

## 2017-11-29 NOTE — CONSULT NOTE ADULT - ASSESSMENT
71yo M with newly diagnosed recurrent pancreatic cancer s/p Whipple in 2015, now presenting with L Iliac muscle hematoma after being started on therapeutic lovenox.     - Recommend holding therapeutic lovenox until H/H stabilizes  - Trend H/H, transfuse as needed  - No acute surgical intervention  - Suggest palliative care consult for pain control   - Will follow  - D/W Dr. Kennedy, covering for Dr. Rojo.     Lluvia Millan PGY 4  [id number]
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 Cultures pending, UA is positive, CXR clear. Pt recently treated for E.coli bacteremia  - continue IVF infusion due to poor oral intake    #Recurrent pancreatic adenocarcinoma:  - large retroperitoneal mass causing ureteral obstruction, recent CT A/P showing notable pancreatic-biliary tree obstruction resulting hyperbilirubinemia  - recommend GI consult for evaluation of possible biliary stent to bypass obstruction and bring down bilirubin  - check CA19.9, CEA  - palliative care consult would be appreciated to assist in pain management, patient take methadone and Dilaudid as outpatient  - at his current clinical state given infection, FTT, and hyperbilirubinemia, cannot offer palliative chemotherapy. Will re-assess if patient improves.   - did have GOC discussion with daughter    #left iliacus muscle hematoma 2/2 anti-coagulation:   - no intervention as per surgery  - ok to hold anti-coagulation temporarily to assure stability of H/H and no extension of hematoma, but patient has hypercoaguable condition given malignancy and currently has extensive clot burden. Would recommend resuming lovenox BID when appropriate. Note pt has lost 20 lbs over last month, please assure lovenox dose is adjusted for this weight loss so he is not over-dosed.
Impression:  1) Retroperitoneal Mass/Recurrent Adenocarcinoma  2) Jaundice   3) IVC Thrombus  4) Periampullary Adenocarcinoma s/p Status Post Whipple Surgery (2015)    Plan:
72M with recurrent pancreatic CA, recent bacteremia secondary to pyelonephritis p/w left iliacus muscle hematoma, fever abd hypotension.      Recommendations:  -no indication for ICU level of care at this time  -sepsis w/u  -broad spectrum abx, IVF hydration  -tylenol prn fever with cooling blanket prn  -f/u all cultures  -blood transfusion  -Consider GI eval for worsening intrahepatic biliary dilatation.  -Palliative Cx for GOC discussion  -pain control  -supportive care
72 year old man with pancreatic cancer, Pain, constipation, debility, encounter for palliative care.

## 2017-11-29 NOTE — DISCHARGE NOTE ADULT - MEDICATION SUMMARY - MEDICATIONS TO CHANGE
I will SWITCH the dose or number of times a day I take the medications listed below when I get home from the hospital:    Dilaudid 2 mg oral tablet  -- 1 tab(s) by mouth every 3 hours, As Needed  only for breakthrough pain MDD:16mg  -- Caution federal law prohibits the transfer of this drug to any person other  than the person for whom it was prescribed.  May cause drowsiness.  Alcohol may intensify this effect.  Use care when operating dangerous machinery.  This prescription cannot be refilled.  Using more of this medication than prescribed may cause serious breathing problems.

## 2017-11-30 DIAGNOSIS — R17 UNSPECIFIED JAUNDICE: ICD-10-CM

## 2017-11-30 DIAGNOSIS — Z29.9 ENCOUNTER FOR PROPHYLACTIC MEASURES, UNSPECIFIED: ICD-10-CM

## 2017-11-30 LAB
ALBUMIN SERPL ELPH-MCNC: 2.2 G/DL — LOW (ref 3.3–5)
ALP SERPL-CCNC: 628 U/L — HIGH (ref 40–120)
ALT FLD-CCNC: 80 U/L — HIGH (ref 4–41)
AST SERPL-CCNC: 121 U/L — HIGH (ref 4–40)
BACTERIA UR CULT: SIGNIFICANT CHANGE UP
BILIRUB SERPL-MCNC: 11.9 MG/DL — HIGH (ref 0.2–1.2)
BUN SERPL-MCNC: 14 MG/DL — SIGNIFICANT CHANGE UP (ref 7–23)
BUN SERPL-MCNC: 14 MG/DL — SIGNIFICANT CHANGE UP (ref 7–23)
CALCIUM SERPL-MCNC: 7.6 MG/DL — LOW (ref 8.4–10.5)
CALCIUM SERPL-MCNC: 8 MG/DL — LOW (ref 8.4–10.5)
CANCER AG19-9 SERPL-ACNC: 167.9 U/ML — HIGH
CEA SERPL-MCNC: 18.4 NG/ML — HIGH (ref 1–3.8)
CHLORIDE SERPL-SCNC: 93 MMOL/L — LOW (ref 98–107)
CHLORIDE SERPL-SCNC: 96 MMOL/L — LOW (ref 98–107)
CO2 SERPL-SCNC: 20 MMOL/L — LOW (ref 22–31)
CO2 SERPL-SCNC: 23 MMOL/L — SIGNIFICANT CHANGE UP (ref 22–31)
CREAT ?TM UR-MCNC: 38.56 MG/DL — SIGNIFICANT CHANGE UP
CREAT SERPL-MCNC: 0.89 MG/DL — SIGNIFICANT CHANGE UP (ref 0.5–1.3)
CREAT SERPL-MCNC: 0.95 MG/DL — SIGNIFICANT CHANGE UP (ref 0.5–1.3)
GLUCOSE SERPL-MCNC: 112 MG/DL — HIGH (ref 70–99)
GLUCOSE SERPL-MCNC: 97 MG/DL — SIGNIFICANT CHANGE UP (ref 70–99)
HCT VFR BLD CALC: 23.2 % — LOW (ref 39–50)
HGB BLD-MCNC: 8.1 G/DL — LOW (ref 13–17)
MAGNESIUM SERPL-MCNC: 2.1 MG/DL — SIGNIFICANT CHANGE UP (ref 1.6–2.6)
MAGNESIUM SERPL-MCNC: 2.1 MG/DL — SIGNIFICANT CHANGE UP (ref 1.6–2.6)
MCHC RBC-ENTMCNC: 28.9 PG — SIGNIFICANT CHANGE UP (ref 27–34)
MCHC RBC-ENTMCNC: 34.9 % — SIGNIFICANT CHANGE UP (ref 32–36)
MCV RBC AUTO: 82.9 FL — SIGNIFICANT CHANGE UP (ref 80–100)
NRBC # FLD: 0 — SIGNIFICANT CHANGE UP
OSMOLALITY SERPL: 266 MOSMO/KG — LOW (ref 275–295)
OSMOLALITY UR: 378 MOSMO/KG — SIGNIFICANT CHANGE UP (ref 50–1200)
PHOSPHATE SERPL-MCNC: 2.4 MG/DL — LOW (ref 2.5–4.5)
PHOSPHATE SERPL-MCNC: 2.4 MG/DL — LOW (ref 2.5–4.5)
PLATELET # BLD AUTO: 412 K/UL — HIGH (ref 150–400)
PMV BLD: 10.4 FL — SIGNIFICANT CHANGE UP (ref 7–13)
POTASSIUM SERPL-MCNC: 4.2 MMOL/L — SIGNIFICANT CHANGE UP (ref 3.5–5.3)
POTASSIUM SERPL-MCNC: 4.3 MMOL/L — SIGNIFICANT CHANGE UP (ref 3.5–5.3)
POTASSIUM SERPL-SCNC: 4.2 MMOL/L — SIGNIFICANT CHANGE UP (ref 3.5–5.3)
POTASSIUM SERPL-SCNC: 4.3 MMOL/L — SIGNIFICANT CHANGE UP (ref 3.5–5.3)
PROT SERPL-MCNC: 4.8 G/DL — LOW (ref 6–8.3)
RBC # BLD: 2.8 M/UL — LOW (ref 4.2–5.8)
RBC # FLD: 16.7 % — HIGH (ref 10.3–14.5)
SODIUM SERPL-SCNC: 128 MMOL/L — LOW (ref 135–145)
SODIUM SERPL-SCNC: 129 MMOL/L — LOW (ref 135–145)
SODIUM UR-SCNC: 86 MEQ/L — SIGNIFICANT CHANGE UP
SPECIMEN SOURCE: SIGNIFICANT CHANGE UP
UUN UR-MCNC: 362.1 MG/DL — SIGNIFICANT CHANGE UP
WBC # BLD: 9.54 K/UL — SIGNIFICANT CHANGE UP (ref 3.8–10.5)
WBC # FLD AUTO: 9.54 K/UL — SIGNIFICANT CHANGE UP (ref 3.8–10.5)

## 2017-11-30 PROCEDURE — 99233 SBSQ HOSP IP/OBS HIGH 50: CPT | Mod: GC

## 2017-11-30 PROCEDURE — 99233 SBSQ HOSP IP/OBS HIGH 50: CPT

## 2017-11-30 RX ORDER — SODIUM CHLORIDE 9 MG/ML
1000 INJECTION INTRAMUSCULAR; INTRAVENOUS; SUBCUTANEOUS ONCE
Qty: 0 | Refills: 0 | Status: COMPLETED | OUTPATIENT
Start: 2017-11-30 | End: 2017-11-30

## 2017-11-30 RX ORDER — MORPHINE SULFATE 50 MG/1
15 CAPSULE, EXTENDED RELEASE ORAL EVERY 12 HOURS
Qty: 0 | Refills: 0 | Status: DISCONTINUED | OUTPATIENT
Start: 2017-11-30 | End: 2017-12-02

## 2017-11-30 RX ORDER — SODIUM,POTASSIUM PHOSPHATES 278-250MG
1 POWDER IN PACKET (EA) ORAL ONCE
Qty: 0 | Refills: 0 | Status: COMPLETED | OUTPATIENT
Start: 2017-11-30 | End: 2017-11-30

## 2017-11-30 RX ORDER — ACETAMINOPHEN 500 MG
650 TABLET ORAL EVERY 6 HOURS
Qty: 0 | Refills: 0 | Status: DISCONTINUED | OUTPATIENT
Start: 2017-11-30 | End: 2017-12-02

## 2017-11-30 RX ORDER — SODIUM CHLORIDE 9 MG/ML
1000 INJECTION INTRAMUSCULAR; INTRAVENOUS; SUBCUTANEOUS
Qty: 0 | Refills: 0 | Status: DISCONTINUED | OUTPATIENT
Start: 2017-11-30 | End: 2017-11-30

## 2017-11-30 RX ORDER — MORPHINE SULFATE 50 MG/1
15 CAPSULE, EXTENDED RELEASE ORAL ONCE
Qty: 0 | Refills: 0 | Status: DISCONTINUED | OUTPATIENT
Start: 2017-11-30 | End: 2017-11-30

## 2017-11-30 RX ADMIN — MORPHINE SULFATE 15 MILLIGRAM(S): 50 CAPSULE, EXTENDED RELEASE ORAL at 22:19

## 2017-11-30 RX ADMIN — Medication 650 MILLIGRAM(S): at 13:35

## 2017-11-30 RX ADMIN — Medication 100 MILLIGRAM(S): at 13:35

## 2017-11-30 RX ADMIN — TAMSULOSIN HYDROCHLORIDE 0.4 MILLIGRAM(S): 0.4 CAPSULE ORAL at 22:19

## 2017-11-30 RX ADMIN — Medication 250 MILLIGRAM(S): at 05:54

## 2017-11-30 RX ADMIN — MORPHINE SULFATE 15 MILLIGRAM(S): 50 CAPSULE, EXTENDED RELEASE ORAL at 23:19

## 2017-11-30 RX ADMIN — FINASTERIDE 5 MILLIGRAM(S): 5 TABLET, FILM COATED ORAL at 12:02

## 2017-11-30 RX ADMIN — SODIUM CHLORIDE 500 MILLILITER(S): 9 INJECTION INTRAMUSCULAR; INTRAVENOUS; SUBCUTANEOUS at 08:52

## 2017-11-30 RX ADMIN — Medication 1 TABLET(S): at 09:02

## 2017-11-30 RX ADMIN — Medication 100 MILLIGRAM(S): at 07:49

## 2017-11-30 RX ADMIN — HYDROMORPHONE HYDROCHLORIDE 1 MILLIGRAM(S): 2 INJECTION INTRAMUSCULAR; INTRAVENOUS; SUBCUTANEOUS at 09:55

## 2017-11-30 RX ADMIN — CEFEPIME 100 MILLIGRAM(S): 1 INJECTION, POWDER, FOR SOLUTION INTRAMUSCULAR; INTRAVENOUS at 17:04

## 2017-11-30 RX ADMIN — Medication 250 MILLIGRAM(S): at 17:05

## 2017-11-30 RX ADMIN — Medication 100 MILLIGRAM(S): at 22:19

## 2017-11-30 RX ADMIN — SENNA PLUS 2 TABLET(S): 8.6 TABLET ORAL at 22:19

## 2017-11-30 RX ADMIN — MORPHINE SULFATE 15 MILLIGRAM(S): 50 CAPSULE, EXTENDED RELEASE ORAL at 11:31

## 2017-11-30 RX ADMIN — MORPHINE SULFATE 15 MILLIGRAM(S): 50 CAPSULE, EXTENDED RELEASE ORAL at 12:31

## 2017-11-30 RX ADMIN — HYDROMORPHONE HYDROCHLORIDE 1 MILLIGRAM(S): 2 INJECTION INTRAMUSCULAR; INTRAVENOUS; SUBCUTANEOUS at 09:38

## 2017-11-30 RX ADMIN — Medication 1 TABLET(S): at 07:49

## 2017-11-30 RX ADMIN — Medication 1 TABLET(S): at 17:04

## 2017-11-30 RX ADMIN — CEFEPIME 100 MILLIGRAM(S): 1 INJECTION, POWDER, FOR SOLUTION INTRAMUSCULAR; INTRAVENOUS at 05:55

## 2017-11-30 NOTE — PROGRESS NOTE ADULT - PROBLEM SELECTOR PLAN 6
-onc not currently recommending chemo/treatment  -palliative following for pain control and GOC -onc not currently recommending chemo/treatment  -palliative following for pain control and GOC  -MS contin and dilaudid PRN

## 2017-11-30 NOTE — PROGRESS NOTE ADULT - SUBJECTIVE AND OBJECTIVE BOX
Patient is a 72y old  Male who presents with a chief complaint of fever/ abd pain (2017 15:49)        SUBJECTIVE / OVERNIGHT EVENTS: Patient with persistent hyponatremia. Pain controlled on current regimen. Denies chest pain, SOB, nausea, vomiting, diarrhea.      MEDICATIONS  (STANDING):  cefepime  IVPB 2000 milliGRAM(s) IV Intermittent every 12 hours  docusate sodium 100 milliGRAM(s) Oral three times a day  finasteride 5 milliGRAM(s) Oral daily  lactobacillus acidophilus 1 Tablet(s) Oral every 12 hours  morphine ER Tablet 15 milliGRAM(s) Oral every 12 hours  senna 2 Tablet(s) Oral at bedtime  tamsulosin 0.4 milliGRAM(s) Oral at bedtime  vancomycin  IVPB 1000 milliGRAM(s) IV Intermittent every 12 hours    MEDICATIONS  (PRN):  HYDROmorphone  Injectable 1 milliGRAM(s) IV Push every 3 hours PRN Moderate to severe pain  simethicone 80 milliGRAM(s) Chew every 6 hours PRN Dyspepsia        CAPILLARY BLOOD GLUCOSE        I&O's Summary    2017 07:01  -  2017 07:00  --------------------------------------------------------  IN: 1350 mL / OUT: 600 mL / NET: 750 mL        PHYSICAL EXAM  GENERAL: NAD, resting comfortably in bed  EYES: PERRLA, conjunctiva and sclera clear  CHEST/LUNG: Clear to auscultation bilaterally; No wheeze or crackles  HEART: Regular rate and rhythm; No murmurs, rubs, or gallops  ABDOMEN: Soft, tender in epigastrium, Nondistended, no guarding; Bowel sounds present  EXTREMITIES:  2+ Peripheral Pulses, No clubbing, cyanosis, or edema  NEURO: AAOx3, moving all extremities  SKIN: No rashes or lesions    LABS:                        8.1    9.54  )-----------( 412      ( 2017 05:20 )             23.2     11-30    129<L>  |  96<L>  |  14  ----------------------------<  112<H>  4.2   |  23  |  0.95    Ca    8.0<L>      2017 11:02  Phos  2.4       Mg     2.1         TPro  4.8<L>  /  Alb  2.2<L>  /  TBili  11.9<H>  /  DBili  x   /  AST  121<H>  /  ALT  80<H>  /  AlkPhos  628<H>      PT/INR - ( 2017 06:10 )   PT: 14.8 SEC;   INR: 1.31          PTT - ( 2017 06:10 )  PTT:28.6 SEC      Urinalysis Basic - ( 2017 19:42 )    Color: YELLOW / Appearance: HAZY / S.021 / pH: 6.0  Gluc: NEGATIVE / Ketone: NEGATIVE  / Bili: LARGE / Urobili: 4 E.U.   Blood: SMALL / Protein: 100 / Nitrite: NEGATIVE   Leuk Esterase: MODERATE / RBC: 5-10 / WBC 5-10   Sq Epi: OCC / Non Sq Epi: x / Bacteria: MOD        RADIOLOGY & ADDITIONAL TESTS:        Nazia Preciado MD (PGY-1)  #[id number]/[phone]

## 2017-11-30 NOTE — PROGRESS NOTE ADULT - PROBLEM SELECTOR PLAN 2
-likely 2/2 left iliacus hematoma  -s/p 1U PRBC with appropriate rise in Hgb  -holding lovenox   -SCDs for ppx

## 2017-11-30 NOTE — PROGRESS NOTE ADULT - SUBJECTIVE AND OBJECTIVE BOX
INTERVAL HPI/OVERNIGHT EVENTS:    Allergies    No Known Allergies    Intolerances        Code Status:          PRESENT SYMPTOMS:   SOURCE:  [ ] Patient   [ ] Family   [ ] Team     Pain:   Onset:      Location:          Duration:        Character:         Aggravating factors:          Relieving Factors:             Timing:         Severity:      Dyspnea [ ] YES [ ] NO - Mild [ ]  Moderate [ ]  Severe [ ]   Anxiety:  [ ] YES [ ] NO  Fatigue: [ ] YES [ ] NO  Nausea: [ ] YES [ ] NO  Loss of Appetite: [ ] YES [ ] NO  Constipation [ ] YES   [ ] No     OTHER SYMPTOMS:  [ ] All other ROS negative     [ ] Unable to obtain due to poor mentation    Does the patient meet criteria for Severe Protein Calorie Malnutrition?  Yes [ ]  No [ ]   PPSV less than <30% [ ]  Anasarca [ ]  Albumin <2 [ ]  Catabolic State [ ]  Poor nutritional intake [ ]  Significant weight loss [ ]     MEDICATIONS  (STANDING):  cefepime  IVPB 2000 milliGRAM(s) IV Intermittent every 12 hours  docusate sodium 100 milliGRAM(s) Oral three times a day  finasteride 5 milliGRAM(s) Oral daily  lactobacillus acidophilus 1 Tablet(s) Oral every 12 hours  senna 2 Tablet(s) Oral at bedtime  tamsulosin 0.4 milliGRAM(s) Oral at bedtime  vancomycin  IVPB 1000 milliGRAM(s) IV Intermittent every 12 hours    MEDICATIONS  (PRN):  HYDROmorphone  Injectable 1 milliGRAM(s) IV Push every 3 hours PRN Moderate to severe pain  simethicone 80 milliGRAM(s) Chew every 6 hours PRN Dyspepsia      Palliative Performance Status Version 2:         %  ECOG -        Physical Exam:    General: [ ] Alert,  A&O x     [ ] lethargic   [ ] Agitated   [ ] Cachexia   HEENT: [ ] Normal   [ ] Dry mouth   [ ] ET Tube    [ ] Trach   Lungs: [ ] Clear [ ] Rhonchi  [ ] Crackles [ ] Wheezing [ ] Tachypnea  [ ] Audible excessive secretions   Cardiovascular:  [ ] Regular rate and rhythm  [ ] Irregular [ ] Tachycardia   [ ] Bradycardia   Abdomen: [ ] Soft  [ ] Distended  [ ]  [ ] +BS  [ ] Non tender [ ] Tender  [ ]PEG   [ ] NGT   Last BM:     Genitourinary:  [ ] Normal [ ] Incontinent   [ ] Oliguria/Anuria   [ ] Ludwig  Musculoskeletal:  [ ] Normal   [ ] Generalized weakness  [ ] Bedbound  [ ] Edema   Neurological: [ ] No focal deficits  [ ] Cognitive impairment     Skin: [ ] Normal   [ ] Pressure ulcers     Vital Signs Last 24 Hrs  T(C): 37.2 (2017 09:38), Max: 37.5 (2017 14:45)  T(F): 99 (2017 09:38), Max: 99.5 (2017 14:45)  HR: 98 (2017 09:38) (83 - 114)  BP: 104/68 (2017 09:38) (98/68 - 125/85)  BP(mean): --  RR: 18 (2017 09:38) (18 - 18)  SpO2: 98% (2017 09:38) (98% - 100%)    LABS:                        8.1    9.54  )-----------( 412      ( 2017 05:20 )             23.2     11-30    128<L>  |  93<L>  |  14  ----------------------------<  97  4.3   |  20<L>  |  0.89    Ca    7.6<L>      2017 05:20  Phos  2.4     11-30  Mg     2.1     11-30    TPro  4.8<L>  /  Alb  2.2<L>  /  TBili  11.9<H>  /  DBili  x   /  AST  121<H>  /  ALT  80<H>  /  AlkPhos  628<H>  11-30    PT/INR - ( 2017 06:10 )   PT: 14.8 SEC;   INR: 1.31          PTT - ( 2017 06:10 )  PTT:28.6 SEC  Urinalysis Basic - ( 2017 19:42 )    Color: YELLOW / Appearance: HAZY / S.021 / pH: 6.0  Gluc: NEGATIVE / Ketone: NEGATIVE  / Bili: LARGE / Urobili: 4 E.U.   Blood: SMALL / Protein: 100 / Nitrite: NEGATIVE   Leuk Esterase: MODERATE / RBC: 5-10 / WBC 5-10   Sq Epi: OCC / Non Sq Epi: x / Bacteria: MOD      I&O's Summary    2017 07:01  -  2017 07:00  --------------------------------------------------------  IN: 1350 mL / OUT: 600 mL / NET: 750 mL        RADIOLOGY & ADDITIONAL STUDIES: INTERVAL HPI/OVERNIGHT EVENTS:    Patient continues to have pain in his abdomen which is only releived with the Dilaudid PRN. He required 2 breaktrhoughs in 24 hours, but endorsed that he had pain but did not as for the PRN.     Allergies    No Known Allergies    Intolerances        Code Status:          PRESENT SYMPTOMS:   SOURCE:  [ x] Patient   [ ] Family   [ ] Team     Pain: [ x] YES [ ] NO  Onset:   1 month ago             Location:      Abdomen and left hip                    Duration:  Constant               Character: sharp and aching           Aggravating factors:    palpation                     Relieving factors: Dilaudid    Radiation: non             Timing:                             Severity:  8/10    Dyspnea [ ] YES [x ] NO - Mild [ ]  Moderate [ ]  Severe [ ]   Anxiety:  [ ] YES [x ] NO  Fatigue: [ ] YES [ x] NO  Nausea: [ ] YES [x ] NO  Loss of Appetite: [ ] YES [x ] NO  Constipation [ ] YES   [x ] No     OTHER SYMPTOMS:  [x ] All other ROS negative     [ ] Unable to obtain due to poor mentation    Does the patient meet criteria for Severe Protein Calorie Malnutrition?  Yes [ ]  No [ ]   PPSV less than <30% [ ]  Anasarca [ ]  Albumin <2 [ ]  Catabolic State [ ]  Poor nutritional intake [ ]  Significant weight loss [ ]     MEDICATIONS  (STANDING):  cefepime  IVPB 2000 milliGRAM(s) IV Intermittent every 12 hours  docusate sodium 100 milliGRAM(s) Oral three times a day  finasteride 5 milliGRAM(s) Oral daily  lactobacillus acidophilus 1 Tablet(s) Oral every 12 hours  senna 2 Tablet(s) Oral at bedtime  tamsulosin 0.4 milliGRAM(s) Oral at bedtime  vancomycin  IVPB 1000 milliGRAM(s) IV Intermittent every 12 hours    MEDICATIONS  (PRN):  HYDROmorphone  Injectable 1 milliGRAM(s) IV Push every 3 hours PRN Moderate to severe pain  simethicone 80 milliGRAM(s) Chew every 6 hours PRN Dyspepsia      Palliative Performance Status Version 2:         50%  ECOG - 3       Physical Exam:    General: [ x Alert,  A&O x     [ ] lethargic   [ ] Agitated   [ ] Cachexia   HEENT: [ ] Normal   [x] Dry mouth   [ ] ET Tube    [ ] Trach   Lungs: [ x Clear [ ] Rhonchi  [ ] Crackles [ ] Wheezing [ ] Tachypnea  [ ] Audible excessive secretions   Cardiovascular:  [x] Regular rate and rhythm  [ ] Irregular [ ] Tachycardia   [ ] Bradycardia   Abdomen: [x] Soft  [ ] Distended  [ ]  [ ] +BS  [ ] Non tender  [x ] Tender to palpation in epigastric area [ ]PEG   [ ] NGT   Last BM: 17  Genitourinary:  [x] Normal [ ] Incontinent   [ ] Oliguria/Anuria   [ ] Ludwig  Musculoskeletal:  [ ] Normal   [x] Generalized weakness  [ ] Bedbound  [ ] Edema   Neurological: [ x No focal deficits  [ ] Cognitive impairment     Skin: [x] Normal   [ ] Pressure ulcers     Vital Signs Last 24 Hrs  T(C): 37.2 (2017 09:38), Max: 37.5 (2017 14:45)  T(F): 99 (2017 09:38), Max: 99.5 (2017 14:45)  HR: 98 (2017 09:38) (83 - 114)  BP: 104/68 (2017 09:38) (98/68 - 125/85)  BP(mean): --  RR: 18 (2017 09:38) (18 - 18)  SpO2: 98% (2017 09:38) (98% - 100%)    LABS:                        8.1    9.54  )-----------( 412      ( 2017 05:20 )             23.2     11-30    128<L>  |  93<L>  |  14  ----------------------------<  97  4.3   |  20<L>  |  0.89    Ca    7.6<L>      2017 05:20  Phos  2.4     30  Mg     2.1     30    TPro  4.8<L>  /  Alb  2.2<L>  /  TBili  11.9<H>  /  DBili  x   /  AST  121<H>  /  ALT  80<H>  /  AlkPhos  628<H>  1130    PT/INR - ( 2017 06:10 )   PT: 14.8 SEC;   INR: 1.31          PTT - ( 2017 06:10 )  PTT:28.6 SEC  Urinalysis Basic - ( 2017 19:42 )    Color: YELLOW / Appearance: HAZY / S.021 / pH: 6.0  Gluc: NEGATIVE / Ketone: NEGATIVE  / Bili: LARGE / Urobili: 4 E.U.   Blood: SMALL / Protein: 100 / Nitrite: NEGATIVE   Leuk Esterase: MODERATE / RBC: 5-10 / WBC 5-10   Sq Epi: OCC / Non Sq Epi: x / Bacteria: MOD      I&O's Summary    2017 07:01  -  2017 07:00  --------------------------------------------------------  IN: 1350 mL / OUT: 600 mL / NET: 750 mL        RADIOLOGY & ADDITIONAL STUDIES: INTERVAL HPI/OVERNIGHT EVENTS:    Patient continues to have pain in his abdomen which is only releived with the Dilaudid PRN. He required 2 breaktrhoughs in 24 hours, but endorsed that he had pain but did not as for the PRN.     Allergies    No Known Allergies    Intolerances        Code Status:  Full code        PRESENT SYMPTOMS:   SOURCE:  [ x] Patient   [ ] Family   [ ] Team     Pain: [ x] YES [ ] NO  Onset:   1 month ago             Location:      Abdomen and left hip                    Duration:  Constant               Character: sharp and aching           Aggravating factors:    palpation                     Relieving factors: Dilaudid    Radiation: non             Timing:                             Severity:  8/10    Dyspnea [ ] YES [x ] NO - Mild [ ]  Moderate [ ]  Severe [ ]   Anxiety:  [ ] YES [x ] NO  Fatigue: [ ] YES [ x] NO  Nausea: [ ] YES [x ] NO  Loss of Appetite: [ ] YES [x ] NO  Constipation [ ] YES   [x ] No     OTHER SYMPTOMS:  [x ] All other ROS negative     [ ] Unable to obtain due to poor mentation    Does the patient meet criteria for Severe Protein Calorie Malnutrition?  Yes [ ]  No [ ]   PPSV less than <30% [ ]  Anasarca [ ]  Albumin <2 [ ]  Catabolic State [ ]  Poor nutritional intake [ ]  Significant weight loss [ ]     MEDICATIONS  (STANDING):  cefepime  IVPB 2000 milliGRAM(s) IV Intermittent every 12 hours  docusate sodium 100 milliGRAM(s) Oral three times a day  finasteride 5 milliGRAM(s) Oral daily  lactobacillus acidophilus 1 Tablet(s) Oral every 12 hours  senna 2 Tablet(s) Oral at bedtime  tamsulosin 0.4 milliGRAM(s) Oral at bedtime  vancomycin  IVPB 1000 milliGRAM(s) IV Intermittent every 12 hours    MEDICATIONS  (PRN):  HYDROmorphone  Injectable 1 milliGRAM(s) IV Push every 3 hours PRN Moderate to severe pain  simethicone 80 milliGRAM(s) Chew every 6 hours PRN Dyspepsia      Palliative Performance Status Version 2:         50%  ECOG - 3       Physical Exam:    General: [ x Alert,  A&O x     [ ] lethargic   [ ] Agitated   [ ] Cachexia   HEENT: [ ] Normal   [x] Dry mouth   [ ] ET Tube    [ ] Trach   Lungs: [ x Clear [ ] Rhonchi  [ ] Crackles [ ] Wheezing [ ] Tachypnea  [ ] Audible excessive secretions   Cardiovascular:  [x] Regular rate and rhythm  [ ] Irregular [ ] Tachycardia   [ ] Bradycardia   Abdomen: [x] Soft  [ ] Distended  [ ]  [ ] +BS  [ ] Non tender  [x ] Tender to palpation in epigastric area [ ]PEG   [ ] NGT   Last BM: 17  Genitourinary:  [x] Normal [ ] Incontinent   [ ] Oliguria/Anuria   [ ] Ludwig  Musculoskeletal:  [ ] Normal   [x] Generalized weakness  [ ] Bedbound  [ ] Edema   Neurological: [ x No focal deficits  [ ] Cognitive impairment     Skin: [x] Normal   [ ] Pressure ulcers     Vital Signs Last 24 Hrs  T(C): 37.2 (2017 09:38), Max: 37.5 (2017 14:45)  T(F): 99 (2017 09:38), Max: 99.5 (2017 14:45)  HR: 98 (2017 09:38) (83 - 114)  BP: 104/68 (2017 09:38) (98/68 - 125/85)  BP(mean): --  RR: 18 (2017 09:38) (18 - 18)  SpO2: 98% (2017 09:38) (98% - 100%)    LABS:                        8.1    9.54  )-----------( 412      ( 2017 05:20 )             23.2     11-30    128<L>  |  93<L>  |  14  ----------------------------<  97  4.3   |  20<L>  |  0.89    Ca    7.6<L>      2017 05:20  Phos  2.4     -30  Mg     2.1     30    TPro  4.8<L>  /  Alb  2.2<L>  /  TBili  11.9<H>  /  DBili  x   /  AST  121<H>  /  ALT  80<H>  /  AlkPhos  628<H>  1130    PT/INR - ( 2017 06:10 )   PT: 14.8 SEC;   INR: 1.31          PTT - ( 2017 06:10 )  PTT:28.6 SEC  Urinalysis Basic - ( 2017 19:42 )    Color: YELLOW / Appearance: HAZY / S.021 / pH: 6.0  Gluc: NEGATIVE / Ketone: NEGATIVE  / Bili: LARGE / Urobili: 4 E.U.   Blood: SMALL / Protein: 100 / Nitrite: NEGATIVE   Leuk Esterase: MODERATE / RBC: 5-10 / WBC 5-10   Sq Epi: OCC / Non Sq Epi: x / Bacteria: MOD      I&O's Summary    2017 07:01  -  2017 07:00  --------------------------------------------------------  IN: 1350 mL / OUT: 600 mL / NET: 750 mL        RADIOLOGY & ADDITIONAL STUDIES:

## 2017-11-30 NOTE — PROGRESS NOTE ADULT - PROBLEM SELECTOR PLAN 1
Patient to go for [facility] for palliative stent placement today. Prognosis overall remains poor.

## 2017-11-30 NOTE — PROGRESS NOTE ADULT - SUBJECTIVE AND OBJECTIVE BOX
[facility] PROGRESS NOTE:   Pager: [phone]    Interim Events: Sinhala speaking man without acute events overnight.     Physical Exam  Vital Signs Last 24 Hrs  T(C): 37.2 (2017 06:03), Max: 37.5 (2017 14:45)  T(F): 99 (2017 06:03), Max: 99.5 (2017 14:45)  HR: 91 (2017 06:03) (83 - 114)  BP: 110/73 (2017 06:03) (98/68 - 125/85)  BP(mean): --  RR: 18 (2017 06:03) (18 - 18)  SpO2: 98% (2017 06:03) (98% - 100%)    Gen: NAD  HEENT: normocephalic, atraumatic, no scleral icterus  CV: S1, S2, RRR  Pulm: CTA B/L  Abd: Soft, ND, tender in the epigastrium and LLQ    I&O's Detail    2017 07:01  -  2017 07:00  --------------------------------------------------------  IN:    IV PiggyBack: 600 mL    sodium chloride 0.9%: 750 mL  Total IN: 1350 mL    OUT:    Voided: 600 mL  Total OUT: 600 mL    Total NET: 750 mL          Labs:                        8.1    9.54  )-----------( 412      ( 2017 05:20 )             23.2         128<L>  |  93<L>  |  14  ----------------------------<  97  4.3   |  20<L>  |  0.89    Ca    7.6<L>      2017 05:20  Phos  2.4       Mg     2.1         TPro  4.8<L>  /  Alb  2.2<L>  /  TBili  11.9<H>  /  DBili  x   /  AST  121<H>  /  ALT  80<H>  /  AlkPhos  628<H>  11-30    PT/INR - ( 2017 06:10 )   PT: 14.8 SEC;   INR: 1.31          PTT - ( 2017 06:10 )  PTT:28.6 SEC  Urinalysis Basic - ( 2017 19:42 )    Color: YELLOW / Appearance: HAZY / S.021 / pH: 6.0  Gluc: NEGATIVE / Ketone: NEGATIVE  / Bili: LARGE / Urobili: 4 E.U.   Blood: SMALL / Protein: 100 / Nitrite: NEGATIVE   Leuk Esterase: MODERATE / RBC: 5-10 / WBC 5-10   Sq Epi: OCC / Non Sq Epi: x / Bacteria: MOD

## 2017-11-30 NOTE — PROGRESS NOTE ADULT - PROBLEM SELECTOR PLAN 2
Patient was taking ATC dilaudid at home now on Dilaudid 1mg IV q3h PRN, required 2 doses in 24hrs. Would initiate MsContin 15mg q12h with continued Dilaudid PRN.

## 2017-11-30 NOTE — PROGRESS NOTE ADULT - SUBJECTIVE AND OBJECTIVE BOX
Chief Complaint:  Patient is a 72y old  Male who presents with a chief complaint of fever/ abd pain (29 Nov 2017 15:49)      Interval Events:   - patient with fever today to 101.9  - he still endorses mid abd pain  - he is on vancomycin and cefepime    Allergies:  No Known Allergies      Hospital Medications:  acetaminophen   Tablet 650 milliGRAM(s) Oral every 6 hours PRN  cefepime  IVPB 2000 milliGRAM(s) IV Intermittent every 12 hours  docusate sodium 100 milliGRAM(s) Oral three times a day  finasteride 5 milliGRAM(s) Oral daily  HYDROmorphone  Injectable 1 milliGRAM(s) IV Push every 3 hours PRN  lactobacillus acidophilus 1 Tablet(s) Oral every 12 hours  morphine ER Tablet 15 milliGRAM(s) Oral every 12 hours  senna 2 Tablet(s) Oral at bedtime  simethicone 80 milliGRAM(s) Chew every 6 hours PRN  tamsulosin 0.4 milliGRAM(s) Oral at bedtime  vancomycin  IVPB 1000 milliGRAM(s) IV Intermittent every 12 hours      PMHX/PSHX:  Adenocarcinoma  No pertinent past medical history  Pancreatic carcinoma  Whipple disease  Jejunostomy tube in situ  No significant past surgical history      Family history:  No pertinent family history in first degree relatives  Family history of heart disease  No pertinent family history in first degree relatives      ROS:     General:  (+) fevers, (-) chills, night sweats, fatigue   Eyes:  Good vision, no reported pain  ENT:  No sore throat, pain, runny nose  CV:  No chest pain, palpitations  Resp:  No cough, wheezing, SOB  GI:  See HPI  :  No pain, bleeding, incontinence, nocturia  Muscle:  No pain, weakness  Neuro:  No weakness, tingling, memory problems  Psych:  No fatigue, insomnia, mood problems, depression  Endocrine:  No cold/heat intolerance  Heme:  No petechiae, ecchymosis, easy bruising  Skin:  No rash, edema      PHYSICAL EXAM:   Vital Signs:  Vital Signs Last 24 Hrs  T(C): 37.6 (30 Nov 2017 14:30), Max: 38.8 (30 Nov 2017 13:30)  T(F): 99.7 (30 Nov 2017 14:30), Max: 101.9 (30 Nov 2017 13:30)  HR: 100 (30 Nov 2017 13:30) (83 - 114)  BP: 123/71 (30 Nov 2017 13:30) (98/68 - 125/85)  BP(mean): --  RR: 18 (30 Nov 2017 13:30) (18 - 18)  SpO2: 98% (30 Nov 2017 13:30) (98% - 100%)  Daily     Daily     GENERAL:  NAD  HEENT:  sclera anicteric  CHEST:  no respiratory distress, CTAB  HEART:  RRR, no MRG, no edema  ABDOMEN:  Soft, non-tender, non-distended, no masses , no hepato-splenomegaly,   EXTREMITIES:  no cyanosis, no edema  SKIN:  No rash  NEURO:  Alert, oriented      LABS:                        8.1    9.54  )-----------( 412      ( 30 Nov 2017 05:20 )             23.2     11-30    129<L>  |  96<L>  |  14  ----------------------------<  112<H>  4.2   |  23  |  0.95    Ca    8.0<L>      30 Nov 2017 11:02  Phos  2.4     11-30  Mg     2.1     11-30    TPro  4.8<L>  /  Alb  2.2<L>  /  TBili  11.9<H>  /  DBili  x   /  AST  121<H>  /  ALT  80<H>  /  AlkPhos  628<H>  11-30    LIVER FUNCTIONS - ( 30 Nov 2017 05:20 )  Alb: 2.2 g/dL / Pro: 4.8 g/dL / ALK PHOS: 628 u/L / ALT: 80 u/L / AST: 121 u/L / GGT: x           PT/INR - ( 29 Nov 2017 06:10 )   PT: 14.8 SEC;   INR: 1.31          PTT - ( 29 Nov 2017 06:10 )  PTT:28.6 SEC      Imaging:  < from: CT Abdomen and Pelvis w/ IV Cont (11.28.17 @ 19:51) >  BOWEL: Distention of the biliary pancreatic with a transition point   associated with an ill-defined retroperitoneal mass as below. The   gastrojejunal is unremarkable.     LIVER: Within normallimits.  BILE DUCTS: Moderate central intrahepatic biliary ductal dilatation,   slightly increased since 11/15/2017.  GALLBLADDER: Cholecystectomy.  SPLEEN: Within normal limits.  PANCREAS: Status post Whipple procedure. The pancreatic body and tail are   atrophic with ductal dilatation.    < end of copied text >

## 2017-11-30 NOTE — PROGRESS NOTE ADULT - PROBLEM SELECTOR PLAN 1
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

## 2017-11-30 NOTE — PROGRESS NOTE ADULT - PROBLEM SELECTOR PLAN 5
-elevated Tbili, alk phos and AST, likely 2/2 congestive hepatopathy   -Pending ERCP and palliative stent placement  -GI following

## 2017-12-01 LAB
ALBUMIN SERPL ELPH-MCNC: 2.1 G/DL — LOW (ref 3.3–5)
ALP SERPL-CCNC: 559 U/L — HIGH (ref 40–120)
ALT FLD-CCNC: 66 U/L — HIGH (ref 4–41)
APTT BLD: 31.1 SEC — SIGNIFICANT CHANGE UP (ref 27.5–37.4)
AST SERPL-CCNC: 93 U/L — HIGH (ref 4–40)
BACTERIA UR CULT: SIGNIFICANT CHANGE UP
BASOPHILS # BLD AUTO: 0.04 K/UL — SIGNIFICANT CHANGE UP (ref 0–0.2)
BASOPHILS NFR BLD AUTO: 0.4 % — SIGNIFICANT CHANGE UP (ref 0–2)
BILIRUB SERPL-MCNC: 12.8 MG/DL — HIGH (ref 0.2–1.2)
BUN SERPL-MCNC: 14 MG/DL — SIGNIFICANT CHANGE UP (ref 7–23)
CALCIUM SERPL-MCNC: 8 MG/DL — LOW (ref 8.4–10.5)
CHLORIDE SERPL-SCNC: 94 MMOL/L — LOW (ref 98–107)
CO2 SERPL-SCNC: 20 MMOL/L — LOW (ref 22–31)
CREAT ?TM UR-MCNC: 55.69 MG/DL — SIGNIFICANT CHANGE UP
CREAT SERPL-MCNC: 0.87 MG/DL — SIGNIFICANT CHANGE UP (ref 0.5–1.3)
EOSINOPHIL # BLD AUTO: 0.1 K/UL — SIGNIFICANT CHANGE UP (ref 0–0.5)
EOSINOPHIL NFR BLD AUTO: 1.1 % — SIGNIFICANT CHANGE UP (ref 0–6)
GLUCOSE SERPL-MCNC: 72 MG/DL — SIGNIFICANT CHANGE UP (ref 70–99)
HCT VFR BLD CALC: 24.3 % — LOW (ref 39–50)
HGB BLD-MCNC: 8.1 G/DL — LOW (ref 13–17)
IMM GRANULOCYTES # BLD AUTO: 0.2 # — SIGNIFICANT CHANGE UP
IMM GRANULOCYTES NFR BLD AUTO: 2.2 % — HIGH (ref 0–1.5)
INR BLD: 1.51 — HIGH (ref 0.88–1.17)
LYMPHOCYTES # BLD AUTO: 0.68 K/UL — LOW (ref 1–3.3)
LYMPHOCYTES # BLD AUTO: 7.6 % — LOW (ref 13–44)
MAGNESIUM SERPL-MCNC: 2.1 MG/DL — SIGNIFICANT CHANGE UP (ref 1.6–2.6)
MCHC RBC-ENTMCNC: 28.8 PG — SIGNIFICANT CHANGE UP (ref 27–34)
MCHC RBC-ENTMCNC: 33.3 % — SIGNIFICANT CHANGE UP (ref 32–36)
MCV RBC AUTO: 86.5 FL — SIGNIFICANT CHANGE UP (ref 80–100)
MONOCYTES # BLD AUTO: 0.75 K/UL — SIGNIFICANT CHANGE UP (ref 0–0.9)
MONOCYTES NFR BLD AUTO: 8.3 % — SIGNIFICANT CHANGE UP (ref 2–14)
NEUTROPHILS # BLD AUTO: 7.23 K/UL — SIGNIFICANT CHANGE UP (ref 1.8–7.4)
NEUTROPHILS NFR BLD AUTO: 80.4 % — HIGH (ref 43–77)
NRBC # FLD: 0 — SIGNIFICANT CHANGE UP
OSMOLALITY SERPL: 269 MOSMO/KG — LOW (ref 275–295)
OSMOLALITY UR: 438 MOSMO/KG — SIGNIFICANT CHANGE UP (ref 50–1200)
PHOSPHATE SERPL-MCNC: 2.5 MG/DL — SIGNIFICANT CHANGE UP (ref 2.5–4.5)
PLATELET # BLD AUTO: 446 K/UL — HIGH (ref 150–400)
PMV BLD: 10 FL — SIGNIFICANT CHANGE UP (ref 7–13)
POTASSIUM SERPL-MCNC: 3.8 MMOL/L — SIGNIFICANT CHANGE UP (ref 3.5–5.3)
POTASSIUM SERPL-SCNC: 3.8 MMOL/L — SIGNIFICANT CHANGE UP (ref 3.5–5.3)
PROT SERPL-MCNC: 5.2 G/DL — LOW (ref 6–8.3)
PROTHROM AB SERPL-ACNC: 17 SEC — HIGH (ref 9.8–13.1)
RBC # BLD: 2.81 M/UL — LOW (ref 4.2–5.8)
RBC # FLD: 16.9 % — HIGH (ref 10.3–14.5)
REVIEW TO FOLLOW: YES — SIGNIFICANT CHANGE UP
SODIUM SERPL-SCNC: 128 MMOL/L — LOW (ref 135–145)
SODIUM UR-SCNC: 103 MEQ/L — SIGNIFICANT CHANGE UP
SPECIMEN SOURCE: SIGNIFICANT CHANGE UP
UUN UR-MCNC: 410.2 MG/DL — SIGNIFICANT CHANGE UP
VANCOMYCIN TROUGH SERPL-MCNC: 14.7 UG/ML — SIGNIFICANT CHANGE UP (ref 10–20)
WBC # BLD: 9 K/UL — SIGNIFICANT CHANGE UP (ref 3.8–10.5)
WBC # FLD AUTO: 9 K/UL — SIGNIFICANT CHANGE UP (ref 3.8–10.5)

## 2017-12-01 PROCEDURE — 99232 SBSQ HOSP IP/OBS MODERATE 35: CPT | Mod: GC

## 2017-12-01 RX ORDER — SODIUM CHLORIDE 9 MG/ML
1 INJECTION INTRAMUSCULAR; INTRAVENOUS; SUBCUTANEOUS EVERY 4 HOURS
Qty: 0 | Refills: 0 | Status: COMPLETED | OUTPATIENT
Start: 2017-12-01 | End: 2017-12-01

## 2017-12-01 RX ORDER — SODIUM CHLORIDE 9 MG/ML
1000 INJECTION INTRAMUSCULAR; INTRAVENOUS; SUBCUTANEOUS
Qty: 0 | Refills: 0 | Status: DISCONTINUED | OUTPATIENT
Start: 2017-12-01 | End: 2017-12-02

## 2017-12-01 RX ORDER — HYDROMORPHONE HYDROCHLORIDE 2 MG/ML
6 INJECTION INTRAMUSCULAR; INTRAVENOUS; SUBCUTANEOUS
Qty: 0 | Refills: 0 | Status: DISCONTINUED | OUTPATIENT
Start: 2017-12-01 | End: 2017-12-02

## 2017-12-01 RX ORDER — SODIUM CHLORIDE 9 MG/ML
1000 INJECTION INTRAMUSCULAR; INTRAVENOUS; SUBCUTANEOUS ONCE
Qty: 0 | Refills: 0 | Status: COMPLETED | OUTPATIENT
Start: 2017-12-01 | End: 2017-12-01

## 2017-12-01 RX ADMIN — HYDROMORPHONE HYDROCHLORIDE 1 MILLIGRAM(S): 2 INJECTION INTRAMUSCULAR; INTRAVENOUS; SUBCUTANEOUS at 12:43

## 2017-12-01 RX ADMIN — Medication 650 MILLIGRAM(S): at 14:59

## 2017-12-01 RX ADMIN — SODIUM CHLORIDE 500 MILLILITER(S): 9 INJECTION INTRAMUSCULAR; INTRAVENOUS; SUBCUTANEOUS at 07:41

## 2017-12-01 RX ADMIN — Medication 1 TABLET(S): at 05:42

## 2017-12-01 RX ADMIN — HYDROMORPHONE HYDROCHLORIDE 1 MILLIGRAM(S): 2 INJECTION INTRAMUSCULAR; INTRAVENOUS; SUBCUTANEOUS at 13:25

## 2017-12-01 RX ADMIN — SENNA PLUS 2 TABLET(S): 8.6 TABLET ORAL at 23:00

## 2017-12-01 RX ADMIN — MORPHINE SULFATE 15 MILLIGRAM(S): 50 CAPSULE, EXTENDED RELEASE ORAL at 05:42

## 2017-12-01 RX ADMIN — Medication 100 MILLIGRAM(S): at 13:25

## 2017-12-01 RX ADMIN — TAMSULOSIN HYDROCHLORIDE 0.4 MILLIGRAM(S): 0.4 CAPSULE ORAL at 23:00

## 2017-12-01 RX ADMIN — SODIUM CHLORIDE 1 GRAM(S): 9 INJECTION INTRAMUSCULAR; INTRAVENOUS; SUBCUTANEOUS at 13:25

## 2017-12-01 RX ADMIN — SODIUM CHLORIDE 1 GRAM(S): 9 INJECTION INTRAMUSCULAR; INTRAVENOUS; SUBCUTANEOUS at 17:20

## 2017-12-01 RX ADMIN — CEFEPIME 100 MILLIGRAM(S): 1 INJECTION, POWDER, FOR SOLUTION INTRAMUSCULAR; INTRAVENOUS at 05:42

## 2017-12-01 RX ADMIN — MORPHINE SULFATE 15 MILLIGRAM(S): 50 CAPSULE, EXTENDED RELEASE ORAL at 06:42

## 2017-12-01 RX ADMIN — Medication 250 MILLIGRAM(S): at 07:41

## 2017-12-01 RX ADMIN — FINASTERIDE 5 MILLIGRAM(S): 5 TABLET, FILM COATED ORAL at 13:25

## 2017-12-01 RX ADMIN — SODIUM CHLORIDE 100 MILLILITER(S): 9 INJECTION INTRAMUSCULAR; INTRAVENOUS; SUBCUTANEOUS at 23:01

## 2017-12-01 RX ADMIN — Medication 100 MILLIGRAM(S): at 05:42

## 2017-12-01 RX ADMIN — MORPHINE SULFATE 15 MILLIGRAM(S): 50 CAPSULE, EXTENDED RELEASE ORAL at 17:20

## 2017-12-01 RX ADMIN — SODIUM CHLORIDE 1 GRAM(S): 9 INJECTION INTRAMUSCULAR; INTRAVENOUS; SUBCUTANEOUS at 09:08

## 2017-12-01 RX ADMIN — Medication 100 MILLIGRAM(S): at 23:00

## 2017-12-01 RX ADMIN — MORPHINE SULFATE 15 MILLIGRAM(S): 50 CAPSULE, EXTENDED RELEASE ORAL at 18:39

## 2017-12-01 RX ADMIN — Medication 1 TABLET(S): at 17:20

## 2017-12-01 RX ADMIN — Medication 250 MILLIGRAM(S): at 17:20

## 2017-12-01 RX ADMIN — CEFEPIME 100 MILLIGRAM(S): 1 INJECTION, POWDER, FOR SOLUTION INTRAMUSCULAR; INTRAVENOUS at 17:21

## 2017-12-01 NOTE — DIETITIAN INITIAL EVALUATION ADULT. - PHYSICAL APPEARANCE
underweight/Nutrition focused physical exam conducted - found signs of malnutrition : [X ]Present   Subcutaneous fat loss: [ ] Orbital fat pads region, [ ]Buccal fat region, [ ]Triceps region,  [ ]Ribs region  Muscle wasting: (SEVERE )Temples region, [SEVERE ]Clavicle region, [ ]Shoulder region, [ ]Scapula region, [ ]Interosseous region,  [ ]thigh region, [ ]Calf region

## 2017-12-01 NOTE — DIETITIAN INITIAL EVALUATION ADULT. - PROBLEM SELECTOR PLAN 5
-elevated Tbili, alk phos and AST, likely 2/2 congestive hepatopathy   -GI c/s in am for possible palliative stent placement

## 2017-12-01 NOTE — DIETITIAN INITIAL EVALUATION ADULT. - PROBLEM SELECTOR PLAN 3
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

## 2017-12-01 NOTE — CHART NOTE - NSCHARTNOTEFT_GEN_A_CORE
NUTRITION SERVICES     Upon Nutritional Assessment by the Registered Dietitian your patient was determined to meet criteria/ has evidence of the following diagnosis/diagnoses:  [ ] Mild Protein Calorie Malnutrition   [ ] Moderate Protein Calorie Malnutrition   [X ] Severe Protein Calorie Malnutrition   [ ] Unspecified Protein Calorie Malnutrition   [ ] Underweight / BMI <19  [ ] Morbid Obesity / BMI >40    Findings as based on:  •  Comprehensive nutritional assessment and consultation    Please refer to Initial Dietitian Evaluation via documents section of [employer] for further recommendations.    Dallas Mendoza RD,CD/N,CDE

## 2017-12-01 NOTE — PROGRESS NOTE ADULT - PROBLEM SELECTOR PLAN 5
-elevated Tbili, alk phos and AST, likely 2/2 congestive hepatopathy   -possible afferent limb syndrome  -GI following  -Surgery consult  -GOC discussion pending

## 2017-12-01 NOTE — PROGRESS NOTE ADULT - SUBJECTIVE AND OBJECTIVE BOX
Consulted for drainage of obstructed biliary pancreatic limb in this post-whipple patient.     On review of CT abdomen and pelvis from 11/28/2017, there is no adequate window for safe access into the obstructed bowel.     Discussed case with attending Dr. Donaldson.

## 2017-12-01 NOTE — DIETITIAN INITIAL EVALUATION ADULT. - OTHER INFO
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 Found to be septic 2/2 UTI and with L iliacus hematoma in setting of therapeutic lovenox use. Goals of Care discussion is pending. Received nutrition consult for Assessment. Pt is currently NPO. PO intakes have been poor. Denies chewing, swallowing difficulties or any nausea, vomiting. Pt was recently admitted and noted weight on 11/5/17 was 146#. Current weight is 123#. 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 Found to be septic 2/2 UTI and with L iliacus hematoma in setting of therapeutic lovenox use. Goals of Care discussion is pending. Received nutrition consult for Assessment. Pt is currently NPO. PO intakes have been poor. Denies chewing, swallowing difficulties or any nausea, vomiting. Pt was recently admitted and noted weight on 11/17 was 146#. Current weight is 123#.

## 2017-12-01 NOTE — PROGRESS NOTE ADULT - PROBLEM SELECTOR PLAN 6
-onc not currently recommending chemo/treatment  -palliative following for pain control and GOC  -MS contin and dilaudid PRN

## 2017-12-01 NOTE — DIETITIAN INITIAL EVALUATION ADULT. - PROBLEM SELECTOR PLAN 2
-likely 2/2 left iliacus hematoma  s/p 1U PRBC, post-transfusion labs pending   -hold lovenox   -trend H/H with goal hgb>7  -dilaudid prn for pain   -SCDs for ppx

## 2017-12-02 VITALS — TEMPERATURE: 99 F

## 2017-12-02 LAB
ALBUMIN SERPL ELPH-MCNC: 2.1 G/DL — LOW (ref 3.3–5)
ALP SERPL-CCNC: 551 U/L — HIGH (ref 40–120)
ALT FLD-CCNC: 59 U/L — HIGH (ref 4–41)
APTT BLD: 32.8 SEC — SIGNIFICANT CHANGE UP (ref 27.5–37.4)
AST SERPL-CCNC: 79 U/L — HIGH (ref 4–40)
BASOPHILS # BLD AUTO: 0.02 K/UL — SIGNIFICANT CHANGE UP (ref 0–0.2)
BASOPHILS NFR BLD AUTO: 0.2 % — SIGNIFICANT CHANGE UP (ref 0–2)
BILIRUB SERPL-MCNC: 13.6 MG/DL — HIGH (ref 0.2–1.2)
BUN SERPL-MCNC: 14 MG/DL — SIGNIFICANT CHANGE UP (ref 7–23)
CALCIUM SERPL-MCNC: 7.9 MG/DL — LOW (ref 8.4–10.5)
CHLORIDE SERPL-SCNC: 97 MMOL/L — LOW (ref 98–107)
CO2 SERPL-SCNC: 19 MMOL/L — LOW (ref 22–31)
CREAT SERPL-MCNC: 1.01 MG/DL — SIGNIFICANT CHANGE UP (ref 0.5–1.3)
EOSINOPHIL # BLD AUTO: 0.05 K/UL — SIGNIFICANT CHANGE UP (ref 0–0.5)
EOSINOPHIL NFR BLD AUTO: 0.5 % — SIGNIFICANT CHANGE UP (ref 0–6)
GLUCOSE SERPL-MCNC: 78 MG/DL — SIGNIFICANT CHANGE UP (ref 70–99)
HCT VFR BLD CALC: 23.4 % — LOW (ref 39–50)
HGB BLD-MCNC: 7.8 G/DL — LOW (ref 13–17)
IMM GRANULOCYTES # BLD AUTO: 0.27 # — SIGNIFICANT CHANGE UP
IMM GRANULOCYTES NFR BLD AUTO: 2.5 % — HIGH (ref 0–1.5)
INR BLD: 1.61 — HIGH (ref 0.88–1.17)
LYMPHOCYTES # BLD AUTO: 0.63 K/UL — LOW (ref 1–3.3)
LYMPHOCYTES # BLD AUTO: 5.9 % — LOW (ref 13–44)
MAGNESIUM SERPL-MCNC: 2 MG/DL — SIGNIFICANT CHANGE UP (ref 1.6–2.6)
MCHC RBC-ENTMCNC: 28.9 PG — SIGNIFICANT CHANGE UP (ref 27–34)
MCHC RBC-ENTMCNC: 33.3 % — SIGNIFICANT CHANGE UP (ref 32–36)
MCV RBC AUTO: 86.7 FL — SIGNIFICANT CHANGE UP (ref 80–100)
MONOCYTES # BLD AUTO: 0.95 K/UL — HIGH (ref 0–0.9)
MONOCYTES NFR BLD AUTO: 8.8 % — SIGNIFICANT CHANGE UP (ref 2–14)
NEUTROPHILS # BLD AUTO: 8.84 K/UL — HIGH (ref 1.8–7.4)
NEUTROPHILS NFR BLD AUTO: 82.1 % — HIGH (ref 43–77)
NRBC # FLD: 0 — SIGNIFICANT CHANGE UP
PHOSPHATE SERPL-MCNC: 2.4 MG/DL — LOW (ref 2.5–4.5)
PLATELET # BLD AUTO: 461 K/UL — HIGH (ref 150–400)
PMV BLD: 10 FL — SIGNIFICANT CHANGE UP (ref 7–13)
POTASSIUM SERPL-MCNC: 3.9 MMOL/L — SIGNIFICANT CHANGE UP (ref 3.5–5.3)
POTASSIUM SERPL-SCNC: 3.9 MMOL/L — SIGNIFICANT CHANGE UP (ref 3.5–5.3)
PROT SERPL-MCNC: 5.2 G/DL — LOW (ref 6–8.3)
PROTHROM AB SERPL-ACNC: 18.2 SEC — HIGH (ref 9.8–13.1)
RBC # BLD: 2.7 M/UL — LOW (ref 4.2–5.8)
RBC # FLD: 17.2 % — HIGH (ref 10.3–14.5)
SODIUM SERPL-SCNC: 130 MMOL/L — LOW (ref 135–145)
WBC # BLD: 10.76 K/UL — HIGH (ref 3.8–10.5)
WBC # FLD AUTO: 10.76 K/UL — HIGH (ref 3.8–10.5)

## 2017-12-02 PROCEDURE — 99239 HOSP IP/OBS DSCHRG MGMT >30: CPT

## 2017-12-02 PROCEDURE — 99232 SBSQ HOSP IP/OBS MODERATE 35: CPT | Mod: GC

## 2017-12-02 RX ORDER — SODIUM,POTASSIUM PHOSPHATES 278-250MG
1 POWDER IN PACKET (EA) ORAL
Qty: 0 | Refills: 0 | Status: DISCONTINUED | OUTPATIENT
Start: 2017-12-02 | End: 2017-12-02

## 2017-12-02 RX ORDER — ACETAMINOPHEN 500 MG
2 TABLET ORAL
Qty: 240 | Refills: 0 | OUTPATIENT
Start: 2017-12-02 | End: 2018-01-01

## 2017-12-02 RX ORDER — HYDROMORPHONE HYDROCHLORIDE 2 MG/ML
3 INJECTION INTRAMUSCULAR; INTRAVENOUS; SUBCUTANEOUS
Qty: 480 | Refills: 0 | OUTPATIENT
Start: 2017-12-02 | End: 2017-12-22

## 2017-12-02 RX ORDER — DOCUSATE SODIUM 100 MG
1 CAPSULE ORAL
Qty: 90 | Refills: 0 | OUTPATIENT
Start: 2017-12-02 | End: 2018-01-01

## 2017-12-02 RX ORDER — METRONIDAZOLE 500 MG
1 TABLET ORAL
Qty: 24 | Refills: 0 | OUTPATIENT
Start: 2017-12-02 | End: 2017-12-10

## 2017-12-02 RX ORDER — CIPROFLOXACIN LACTATE 400MG/40ML
1 VIAL (ML) INTRAVENOUS
Qty: 16 | Refills: 0 | OUTPATIENT
Start: 2017-12-02 | End: 2017-12-10

## 2017-12-02 RX ORDER — MORPHINE SULFATE 50 MG/1
1 CAPSULE, EXTENDED RELEASE ORAL
Qty: 60 | Refills: 0 | OUTPATIENT
Start: 2017-12-02 | End: 2018-01-01

## 2017-12-02 RX ORDER — SENNA PLUS 8.6 MG/1
2 TABLET ORAL
Qty: 60 | Refills: 0 | OUTPATIENT
Start: 2017-12-02 | End: 2018-01-01

## 2017-12-02 RX ADMIN — FINASTERIDE 5 MILLIGRAM(S): 5 TABLET, FILM COATED ORAL at 11:50

## 2017-12-02 RX ADMIN — HYDROMORPHONE HYDROCHLORIDE 6 MILLIGRAM(S): 2 INJECTION INTRAMUSCULAR; INTRAVENOUS; SUBCUTANEOUS at 10:18

## 2017-12-02 RX ADMIN — MORPHINE SULFATE 15 MILLIGRAM(S): 50 CAPSULE, EXTENDED RELEASE ORAL at 07:29

## 2017-12-02 RX ADMIN — CEFEPIME 100 MILLIGRAM(S): 1 INJECTION, POWDER, FOR SOLUTION INTRAMUSCULAR; INTRAVENOUS at 06:29

## 2017-12-02 RX ADMIN — Medication 250 MILLIGRAM(S): at 06:29

## 2017-12-02 RX ADMIN — Medication 100 MILLIGRAM(S): at 06:29

## 2017-12-02 RX ADMIN — Medication 650 MILLIGRAM(S): at 06:29

## 2017-12-02 RX ADMIN — HYDROMORPHONE HYDROCHLORIDE 6 MILLIGRAM(S): 2 INJECTION INTRAMUSCULAR; INTRAVENOUS; SUBCUTANEOUS at 09:48

## 2017-12-02 RX ADMIN — MORPHINE SULFATE 15 MILLIGRAM(S): 50 CAPSULE, EXTENDED RELEASE ORAL at 06:29

## 2017-12-02 RX ADMIN — Medication 1 TABLET(S): at 06:29

## 2017-12-02 NOTE — PROGRESS NOTE ADULT - ASSESSMENT
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 Found to be septic 2/2 UTI and with L iliacus hematoma in setting of therapeutic lovenox use.
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 Patient also with hyponatremia, perhaps paraneoplastic process.
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 Patient received one unit of prbc with appropriate response in his hematocrit.     - Patient awaiting an ERCP today  - Measure hematocrit q6; if stable after three measurements may restart therapeutic lovenox for IVC tumor thrombus  - No acute surgical intervention; please call with additional questions  - Discussed with Attending surgeon, Dr. Curt Grover MD PGY2  B-Team surgery: [id number]
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 Found to be septic 2/2 UTI and with L iliacus hematoma in setting of therapeutic lovenox use.
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 Patient also with hyponatremia, perhaps paraneoplastic process.
72 year old man with pancreatic cancer, Pain, constipation, debility.
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 Found to be septic 2/2 UTI and with L iliacus hematoma in setting of therapeutic lovenox use.
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 Found to be septic 2/2 UTI and with L iliacus hematoma in setting of therapeutic lovenox use.

## 2017-12-02 NOTE — PROGRESS NOTE ADULT - PROBLEM SELECTOR PROBLEM 5
Liver function abnormality

## 2017-12-02 NOTE — PROGRESS NOTE ADULT - PROBLEM SELECTOR PLAN 1
- on vanc, cefepime from 11/28, will change to cipro and flagyl to d/c home with hospice   -11/28 urine and blood cx negative to date  -11/30 urine/blood NGTD as well

## 2017-12-02 NOTE — PROGRESS NOTE ADULT - PROBLEM SELECTOR PLAN 7
Diet: NPO  Bowel: Colace, senna  DVT ppx: SCDs until safe to resume therapeutic lovenox (IVC thrombus)
Diet: regular diet with ensure   Bowel: Colace, senna  DVT ppx: SCDs
Diet: NPO for ERCP  Bowel: Colace, senna  DVT ppx: SCDs until safe to resume therapeutic lovenox (IVC thrombus)

## 2017-12-02 NOTE — PROGRESS NOTE ADULT - PROVIDER SPECIALTY LIST ADULT
Gastroenterology
Gastroenterology
Internal Medicine
Internal Medicine
Intervent Radiology
Palliative Care
Surgery
Internal Medicine
Internal Medicine

## 2017-12-02 NOTE — PROGRESS NOTE ADULT - PROBLEM SELECTOR PROBLEM 2
Anemia due to blood loss
Adenocarcinoma
Adenocarcinoma
Anemia due to blood loss
Pain
Anemia due to blood loss
Anemia due to blood loss

## 2017-12-02 NOTE — PROGRESS NOTE ADULT - PROBLEM SELECTOR PLAN 2
-likely 2/2 left iliacus hematoma  -s/p 1U PRBC with appropriate rise in Hgb  -holding lovenox   -SCDs for ppx  - no plans to restart lovenox due to plan to return home with hospice

## 2017-12-02 NOTE — PROGRESS NOTE ADULT - PROBLEM SELECTOR PLAN 4
PPS 50%. Skin Care PRN. Assist with ADL's PRN.
-likely hypotonic, hypovolemic hyponatremia in setting dec PO intake   -c/w IVF and trend Na
- Likely due to poor nutrition and/or SIADH  -Encourage PO intake, Ensure supplementation when able to eat  - Nutrition evaluation  - started salt tabs
hypotonic  -Minimally responsive to fluids, not likely hypovolemic in nature. Likely due to poor nutrition and/or SIADH  -Encourage PO intake, Ensure supplementation when able to eat  -Nutrition evaluation  -Will consider salt tabs
hypotonic  -Minimally responsive to fluids, not likely hypovolemic in nature. Likely due to poor nutrition and/or SIADH  -Encourage PO intake, Ensure supplementation when able to eat  -Nutrition evaluation  -Will consider salt tabs

## 2017-12-02 NOTE — PROGRESS NOTE ADULT - PROBLEM SELECTOR PLAN 1
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 and obstruction of hepaticojejunostomy limb  - discussed case with Jose Alberto Putnam and Cristiane of advanced endoscopy - GI can attempt deep enteroscopy and enteral stent across stricture if patient and family are amenable, timing of such a procedure would be determined by the patient's clinical course  - this was discussed at length with the patient and his daughter, they are considering this option at this time  -this was discussed with the primary team   - continue abx and IV fluids - due to 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

## 2017-12-02 NOTE — PROGRESS NOTE ADULT - SUBJECTIVE AND OBJECTIVE BOX
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 2) 1 Tablet(s) Oral four times a day with meals  senna 2 Tablet(s) Oral at bedtime  sodium chloride 0.9%. 1000 milliLiter(s) (100 mL/Hr) IV Continuous <Continuous>  tamsulosin 0.4 milliGRAM(s) Oral at bedtime  vancomycin  IVPB 1000 milliGRAM(s) IV Intermittent every 12 hours    MEDICATIONS  (PRN):  acetaminophen   Tablet 650 milliGRAM(s) Oral every 6 hours PRN For Temp greater than 38 C (100.4 F)  HYDROmorphone   Tablet 6 milliGRAM(s) Oral every 3 hours PRN moderate or severe pain  simethicone 80 milliGRAM(s) Chew every 6 hours PRN Dyspepsia        PMHX/PSHX:  Adenocarcinoma  No pertinent past medical history  Pancreatic carcinoma  Whipple disease  Jejunostomy tube in situ  No significant past surgical history      Family history:  No pertinent family history in first degree relatives  Family history of heart disease  No pertinent family history in first degree relatives      ROS:     General:  (+) fevers, (-) chills, night sweats, fatigue   Eyes:  Good vision, no reported pain  ENT:  No sore throat, pain, runny nose  CV:  No chest pain, palpitations  Resp:  No cough, wheezing, SOB  GI:  See HPI  :  No pain, bleeding, incontinence, nocturia  Muscle:  No pain, weakness  Neuro:  No weakness, tingling, memory problems  Psych:  No fatigue, insomnia, mood problems, depression  Endocrine:  No cold/heat intolerance  Heme:  No petechiae, ecchymosis, easy bruising  Skin:  No rash, edema      PHYSICAL EXAM:   Vital Signs Last 24 Hrs  T(C): 37.4 (02 Dec 2017 07:52), Max: 39.3 (01 Dec 2017 15:15)  T(F): 99.3 (02 Dec 2017 07:52), Max: 102.7 (01 Dec 2017 15:15)  HR: 109 (02 Dec 2017 06:20) (76 - 117)  BP: 109/70 (02 Dec 2017 06:20) (92/62 - 109/70)  BP(mean): --  RR: 18 (02 Dec 2017 06:20) (16 - 18)  SpO2: 100% (02 Dec 2017 06:20) (98% - 100%)    GENERAL:  NAD  HEENT:  sclera anicteric  CHEST:  no respiratory distress, CTAB  HEART:  RRR, no MRG, no edema  ABDOMEN:  Soft, mildly-tender in RUQ, non-distended, no masses , no hepato-splenomegaly,   EXTREMITIES:  no cyanosis, no edema  SKIN:  No rash  NEURO:  Alert, oriented      LABS:                        8.1    9.54  )-----------( 412      ( 30 Nov 2017 05:20 )             23.2     11-30    129<L>  |  96<L>  |  14  ----------------------------<  112<H>  4.2   |  23  |  0.95    Ca    8.0<L>      30 Nov 2017 11:02  Phos  2.4     11-30  Mg     2.1     11-30    TPro  4.8<L>  /  Alb  2.2<L>  /  TBili  11.9<H>  /  DBili  x   /  AST  121<H>  /  ALT  80<H>  /  AlkPhos  628<H>  11-30    LIVER FUNCTIONS - ( 30 Nov 2017 05:20 )  Alb: 2.2 g/dL / Pro: 4.8 g/dL / ALK PHOS: 628 u/L / ALT: 80 u/L / AST: 121 u/L / GGT: x           PT/INR - ( 29 Nov 2017 06:10 )   PT: 14.8 SEC;   INR: 1.31          PTT - ( 29 Nov 2017 06:10 )  PTT:28.6 SEC      Imaging:  < from: CT Abdomen and Pelvis w/ IV Cont (11.28.17 @ 19:51) >  BOWEL: Distention of the biliary pancreatic with a transition point   associated with an ill-defined retroperitoneal mass as below. The   gastrojejunal is unremarkable.     LIVER: Within normallimits.  BILE DUCTS: Moderate central intrahepatic biliary ductal dilatation,   slightly increased since 11/15/2017.  GALLBLADDER: Cholecystectomy.  SPLEEN: Within normal limits.  PANCREAS: Status post Whipple procedure. The pancreatic body and tail are   atrophic with ductal dilatation.    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 2) 1 Tablet(s) Oral four times a day with meals  senna 2 Tablet(s) Oral at bedtime  sodium chloride 0.9%. 1000 milliLiter(s) (100 mL/Hr) IV Continuous <Continuous>  tamsulosin 0.4 milliGRAM(s) Oral at bedtime  vancomycin  IVPB 1000 milliGRAM(s) IV Intermittent every 12 hours    MEDICATIONS  (PRN):  acetaminophen   Tablet 650 milliGRAM(s) Oral every 6 hours PRN For Temp greater than 38 C (100.4 F)  HYDROmorphone   Tablet 6 milliGRAM(s) Oral every 3 hours PRN moderate or severe pain  simethicone 80 milliGRAM(s) Chew every 6 hours PRN Dyspepsia        PMHX/PSHX:  Adenocarcinoma  No pertinent past medical history  Pancreatic carcinoma  Whipple disease  Jejunostomy tube in situ  No significant past surgical history      Family history:  No pertinent family history in first degree relatives  Family history of heart disease  No pertinent family history in first degree relatives      ROS:     General:  (+) fevers, (-) chills, night sweats, (+) fatigue   Eyes:  Good vision, no reported pain  ENT:  No sore throat, pain, runny nose  CV:  No chest pain, palpitations  Resp:  No cough, wheezing, SOB  GI:  See HPI  :  No pain, bleeding, incontinence, nocturia  Muscle:  No pain, (+) weakness  Neuro:  (+) weakness, (-) tingling, memory problems  Psych:  No fatigue, insomnia, mood problems, depression  Endocrine:  No cold/heat intolerance  Heme:  No petechiae, ecchymosis, easy bruising  Skin:  No rash, edema      PHYSICAL EXAM:   Vital Signs Last 24 Hrs  T(C): 37.4 (02 Dec 2017 07:52), Max: 39.3 (01 Dec 2017 15:15)  T(F): 99.3 (02 Dec 2017 07:52), Max: 102.7 (01 Dec 2017 15:15)  HR: 109 (02 Dec 2017 06:20) (76 - 117)  BP: 109/70 (02 Dec 2017 06:20) (92/62 - 109/70)  BP(mean): --  RR: 18 (02 Dec 2017 06:20) (16 - 18)  SpO2: 100% (02 Dec 2017 06:20) (98% - 100%)    GENERAL:  NAD  HEENT:  sclera anicteric  CHEST:  no respiratory distress, CTAB  HEART:  RRR, no MRG, no edema  ABDOMEN:  Soft, tender to deep palpation in RUQ, non-distended, no masses , no hepato-splenomegaly,   EXTREMITIES:  no cyanosis, no edema  SKIN:  No rash  NEURO:  Alert, oriented      LABS:                                   7.8    10.76 )-----------( 461      ( 02 Dec 2017 06:40 )             23.4   12-02    130<L>  |  97<L>  |  14  ----------------------------<  78  3.9   |  19<L>  |  1.01    Ca    7.9<L>      02 Dec 2017 06:40  Phos  2.4     12-02  Mg     2.0     12-02    TPro  5.2<L>  /  Alb  2.1<L>  /  TBili  13.6<H>  /  DBili  x   /  AST  79<H>  /  ALT  59<H>  /  AlkPhos  551<H>  12-02        Imaging:  < from: CT Abdomen and Pelvis w/ IV Cont (11.28.17 @ 19:51) >  BOWEL: Distention of the biliary pancreatic with a transition point   associated with an ill-defined retroperitoneal mass as below. The   gastrojejunal is unremarkable.     LIVER: Within normallimits.  BILE DUCTS: Moderate central intrahepatic biliary ductal dilatation,   slightly increased since 11/15/2017.  GALLBLADDER: Cholecystectomy.  SPLEEN: Within normal limits.  PANCREAS: Status post Whipple procedure. The pancreatic body and tail are   atrophic with ductal dilatation.    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 2) 1 Tablet(s) Oral four times a day with meals  senna 2 Tablet(s) Oral at bedtime  sodium chloride 0.9%. 1000 milliLiter(s) (100 mL/Hr) IV Continuous <Continuous>  tamsulosin 0.4 milliGRAM(s) Oral at bedtime  vancomycin  IVPB 1000 milliGRAM(s) IV Intermittent every 12 hours    MEDICATIONS  (PRN):  acetaminophen   Tablet 650 milliGRAM(s) Oral every 6 hours PRN For Temp greater than 38 C (100.4 F)  HYDROmorphone   Tablet 6 milliGRAM(s) Oral every 3 hours PRN moderate or severe pain  simethicone 80 milliGRAM(s) Chew every 6 hours PRN Dyspepsia        PMHX/PSHX:  Adenocarcinoma  No pertinent past medical history  Pancreatic carcinoma  Whipple disease  Jejunostomy tube in situ  No significant past surgical history      Family history:  No pertinent family history in first degree relatives  Family history of heart disease  No pertinent family history in first degree relatives      ROS:     General:  (+) fevers, (-) chills, night sweats, (+) fatigue   Eyes:  Good vision, no reported pain  ENT:  No sore throat, pain, runny nose  CV:  No chest pain, palpitations  Resp:  No cough, wheezing, SOB  GI:  See HPI  :  No pain, bleeding, incontinence, nocturia  Muscle:  No pain, (+) weakness  Neuro:  (+) weakness, (-) tingling, memory problems  Psych:  No fatigue, insomnia, mood problems, depression  Endocrine:  No cold/heat intolerance  Heme:  No petechiae, ecchymosis, easy bruising  Skin:  No rash, edema      PHYSICAL EXAM:   Vital Signs Last 24 Hrs  T(C): 37.4 (02 Dec 2017 07:52), Max: 39.3 (01 Dec 2017 15:15)  T(F): 99.3 (02 Dec 2017 07:52), Max: 102.7 (01 Dec 2017 15:15)  HR: 109 (02 Dec 2017 06:20) (76 - 117)  BP: 109/70 (02 Dec 2017 06:20) (92/62 - 109/70)  BP(mean): --  RR: 18 (02 Dec 2017 06:20) (16 - 18)  SpO2: 100% (02 Dec 2017 06:20) (98% - 100%)    GENERAL:  NAD  HEENT:  sclera anicteric  CHEST:  no respiratory distress, CTAB  HEART:  RRR, no MRG, no edema  ABDOMEN:  Soft, tender to deep palpation in RUQ, non-distended, no masses , no hepato-splenomegaly,   EXTREMITIES:  no cyanosis, no edema  SKIN:  No rash  NEURO:  Alert, oriented      LABS:                                   7.8    10.76 )-----------( 461      ( 02 Dec 2017 06:40 )             23.4   12-02    130<L>  |  97<L>  |  14  ----------------------------<  78  3.9   |  19<L>  |  1.01    Ca    7.9<L>      02 Dec 2017 06:40  Phos  2.4     12-02  Mg     2.0     12-02    TPro  5.2<L>  /  Alb  2.1<L>  /  TBili  13.6<H>  /  DBili  x   /  AST  79<H>  /  ALT  59<H>  /  AlkPhos  551<H>  12-02        Imaging:  < from: CT Abdomen and Pelvis w/ IV Cont (11.28.17 @ 19:51) >  BOWEL: Distention of the biliary pancreatic with a transition point   associated with an ill-defined retroperitoneal mass as below. The   gastrojejunal is unremarkable.     LIVER: Within normallimits.  BILE DUCTS: Moderate central intrahepatic biliary ductal dilatation,   slightly increased since 11/15/2017.  GALLBLADDER: Cholecystectomy.  SPLEEN: Within normal limits.  PANCREAS: Status post Whipple procedure. The pancreatic body and tail are   atrophic with ductal dilatation.

## 2017-12-02 NOTE — PROGRESS NOTE ADULT - PROBLEM SELECTOR PROBLEM 1
Sepsis, due to unspecified organism
Jaundice
Jaundice
Sepsis, due to unspecified organism
Pancreatic cancer
Sepsis, due to unspecified organism
Sepsis, due to unspecified organism

## 2017-12-02 NOTE — PROGRESS NOTE ADULT - PROBLEM SELECTOR PLAN 5
-elevated Tbili, alk phos and AST, likely 2/2 congestive hepatopathy   -possible afferent limb syndrome  -IR unable to place percutaneous drain, due to no window available   - GI offer to try to stent, but due to high risk, family decided to take home with hospice with no intervention   -Surgery consult  -GOC discussion pending

## 2017-12-02 NOTE — PROGRESS NOTE ADULT - SUBJECTIVE AND OBJECTIVE BOX
Patient is a 72y old  Male who presents with a chief complaint of fever/ abd pain (29 Nov 2017 15:49)      SUBJECTIVE / OVERNIGHT EVENTS:  Patient was febrile overnight and cont to have occasional abd pain.   Patient's daughter met with hospice yesterday and setup the patient to go home with hospice.   Denies any CP, SOB, N/V, or dysuria.     MEDICATIONS  (STANDING):  cefepime  IVPB 2000 milliGRAM(s) IV Intermittent every 12 hours  docusate sodium 100 milliGRAM(s) Oral three times a day  finasteride 5 milliGRAM(s) Oral daily  lactobacillus acidophilus 1 Tablet(s) Oral every 12 hours  morphine ER Tablet 15 milliGRAM(s) Oral every 12 hours  potassium acid phosphate/sodium acid phosphate tablet (K-PHOS No. 2) 1 Tablet(s) Oral four times a day with meals  senna 2 Tablet(s) Oral at bedtime  sodium chloride 0.9%. 1000 milliLiter(s) (100 mL/Hr) IV Continuous <Continuous>  tamsulosin 0.4 milliGRAM(s) Oral at bedtime  vancomycin  IVPB 1000 milliGRAM(s) IV Intermittent every 12 hours    MEDICATIONS  (PRN):  acetaminophen   Tablet 650 milliGRAM(s) Oral every 6 hours PRN For Temp greater than 38 C (100.4 F)  HYDROmorphone   Tablet 6 milliGRAM(s) Oral every 3 hours PRN moderate or severe pain  simethicone 80 milliGRAM(s) Chew every 6 hours PRN Dyspepsia      Vital Signs Last 24 Hrs  T(C): 37.4 (02 Dec 2017 07:52), Max: 39.3 (01 Dec 2017 15:15)  T(F): 99.3 (02 Dec 2017 07:52), Max: 102.7 (01 Dec 2017 15:15)  HR: 109 (02 Dec 2017 06:20) (76 - 117)  BP: 109/70 (02 Dec 2017 06:20) (92/62 - 109/70)  BP(mean): --  RR: 18 (02 Dec 2017 06:20) (16 - 18)  SpO2: 100% (02 Dec 2017 06:20) (98% - 100%)  CAPILLARY BLOOD GLUCOSE        I&O's Summary    01 Dec 2017 07:01  -  02 Dec 2017 07:00  --------------------------------------------------------  IN: 1700 mL / OUT: 1100 mL / NET: 600 mL        PHYSICAL EXAM:    GENERAL: Comfortable, no acute distress   HEAD:  Normocephalic, atraumatic  EYES: EOMI, PERRLA, + scleral icterus   HEENT: Moist mucous membranes  NECK: Supple, No JVD  NERVOUS SYSTEM:  Alert & Oriented X3, generalized weakness, nonfocal   CHEST/LUNG: Clear to auscultation bilaterally  HEART: Regular rate and rhythm, +systolic murmur   ABDOMEN: Soft, tender in RUQ and epigastric regions, Nondistended, Bowel sounds present  EXTREMITIES:   No clubbing, cyanosis, or edema  MUSCULOSKELETAL: No muscle tenderness, no joint tenderness  SKIN:  warm and dry, jaundice           LABS:                        7.8    10.76 )-----------( 461      ( 02 Dec 2017 06:40 )             23.4     12-02    130<L>  |  97<L>  |  14  ----------------------------<  78  3.9   |  19<L>  |  1.01    Ca    7.9<L>      02 Dec 2017 06:40  Phos  2.4     12-02  Mg     2.0     12-02    TPro  5.2<L>  /  Alb  2.1<L>  /  TBili  13.6<H>  /  DBili  x   /  AST  79<H>  /  ALT  59<H>  /  AlkPhos  551<H>  12-02    PT/INR - ( 02 Dec 2017 06:40 )   PT: 18.2 SEC;   INR: 1.61          PTT - ( 02 Dec 2017 06:40 )  PTT:32.8 SEC          RADIOLOGY & ADDITIONAL TESTS:    Imaging Personally Reviewed:    Consultant(s) Notes Reviewed:      Care Discussed with Consultants/Other Providers:

## 2017-12-02 NOTE — PROGRESS NOTE ADULT - ATTENDING COMMENTS
I have seen and evaluated the patient with the GI Fellow and GI Team.  I agree with the findings, formulation and plan of care as documented in the resident / fellows note and edited where appropriate.
Patient seen and examined.  Agree with NP note.
Goals of care discussion with daughter.  Will consult hospice.  Pt is not a candidate for any procedure to relieve obstruction given anatomy and risk associated with procedure.  Continue abx for now, however fever likely secondary to malignancy.  Continue to titrate pain medication.
pt to go home on home hospice.  discussed with family.  Continue pain medication and abx.
Goals of care held with discussion with patients daughter.  She wants to discuss options with family but primary goal is to bring [place] back [place].  Continue vanco and cefepime for sepsis; f/u bcx.  F/u vanco trough, f/u bcx.  Gi consulted regarding placement of palliative stent given elevated Tbili and LFTS.  Lovenox held in setting of hematoma.  Given IVC thrombus and adjacent mass; doubt IVC filter an option.  Oncology following

## 2017-12-03 LAB
BACTERIA BLD CULT: SIGNIFICANT CHANGE UP
BACTERIA BLD CULT: SIGNIFICANT CHANGE UP

## 2017-12-05 LAB
BACTERIA BLD CULT: SIGNIFICANT CHANGE UP
BACTERIA BLD CULT: SIGNIFICANT CHANGE UP

## 2018-02-12 NOTE — PROGRESS NOTE ADULT - PROBLEM SELECTOR PLAN 6
-onc c/s in am   -palliative followed patient on previous admission, reconsult for GOC and pain recs bilateral normal...

## 2020-03-03 NOTE — PROGRESS NOTE ADULT - PROBLEM SELECTOR PLAN 5
Filled out and DO signed.  Faxed back to [facility].    Epigastric pain that he states is similar in nature and intensity to his chronic pain - not at surgery site  - will monitor pain response  - Tylenol PRN for mild to moderate pain  - Dilaudid 6mg PO q6h PRN for severe pain ( required 2 PRN's overnight)  - cont bowel regimen and monitor Epigastric pain that he states is similar in nature and intensity to his chronic pain but tenderness also spreading to RUQ over recent surgery site although with no discharge, warmth, surrounding erythema. CT abdomen can not r/o IVC thrombus but otherwise no acute findings.  - will cont to monitor  - Tylenol PRN for mild to moderate pain  - started Dilaudid 1mg IV q6h PRN for severe pain yesterday  - cont bowel regimen and monitor

## 2020-03-10 NOTE — PROGRESS NOTE ADULT - PROBLEM SELECTOR PLAN 6
Pulmonology - Likely multifactorial 2/2 RP mass causing hydronephrosis vs. BPH vs. Pre-renal 2/2 poor po intake/ sepsis  - Cr improved today to 1.45  -On  cc/hr. Will stop after 12 hrs, c/w bryson for at least 24hrs  - Monitor In/out  - Monitor BMP QD  - Avoid nephrotoxic agents

## 2021-02-05 NOTE — PROGRESS NOTE ADULT - PROBLEM SELECTOR PLAN 3
[No Acute Distress] : no acute distress [Well Nourished] : well nourished [Well Developed] : well developed [Well-Appearing] : well-appearing [Normal Sclera/Conjunctiva] : normal sclera/conjunctiva [PERRL] : pupils equal round and reactive to light [EOMI] : extraocular movements intact [No JVD] : no jugular venous distention [No Lymphadenopathy] : no lymphadenopathy Right RP mass with multiple arterially enhancing lesions at the hepatic dome found on CT A/P concerning for recurrence of pancreatic CA vs. new malignancy?   - CEA noted to be elevated to 6.3 in June  - will obtain tissue pathology per Heme/Onc recommendations  - RP mass biopsied by IR yesterday - results of pathology pending [Supple] : supple [Thyroid Normal, No Nodules] : the thyroid was normal and there were no nodules present [No Respiratory Distress] : no respiratory distress  [No Accessory Muscle Use] : no accessory muscle use [Clear to Auscultation] : lungs were clear to auscultation bilaterally [Normal Rate] : normal rate  [Regular Rhythm] : with a regular rhythm [Normal S1, S2] : normal S1 and S2 [No Murmur] : no murmur heard [No Carotid Bruits] : no carotid bruits [No Abdominal Bruit] : a ~M bruit was not heard ~T in the abdomen [No Varicosities] : no varicosities [Pedal Pulses Present] : the pedal pulses are present [No Edema] : there was no peripheral edema [No Palpable Aorta] : no palpable aorta [No Extremity Clubbing/Cyanosis] : no extremity clubbing/cyanosis [Soft] : abdomen soft [Non Tender] : non-tender [Non-distended] : non-distended [No Masses] : no abdominal mass palpated [No HSM] : no HSM [Normal Bowel Sounds] : normal bowel sounds [Normal Posterior Cervical Nodes] : no posterior cervical lymphadenopathy [Normal Anterior Cervical Nodes] : no anterior cervical lymphadenopathy [No CVA Tenderness] : no CVA  tenderness [No Spinal Tenderness] : no spinal tenderness [No Joint Swelling] : no joint swelling [Grossly Normal Strength/Tone] : grossly normal strength/tone Patient with hydronephrosis of R kidney seen on CT A/P 2/2 right RP mass  - s/p ureteral stent placement   - c/w tamsulosin & finasteride for likely BPH given enlarged prostate on CT A/P  - cont to monitor UOP  - outpatient Urology follow up for stent exchange in 3 months [No Rash] : no rash [Coordination Grossly Intact] : coordination grossly intact [No Focal Deficits] : no focal deficits [Normal Gait] : normal gait [Deep Tendon Reflexes (DTR)] : deep tendon reflexes were 2+ and symmetric [Normal Affect] : the affect was normal [Normal Insight/Judgement] : insight and judgment were intact [de-identified] : ANXIOUS

## 2022-02-28 NOTE — PROVIDER CONTACT NOTE (OTHER) - DATE AND TIME:
Call to patient. Patient denies any complaints related to anticoagulation therapy at this time.  Reinforced with patient to call clinic with any medication changes as this can impact INR. Reinforced signs and symptoms bleeding/clotting with patient. Patient aware to seek medical care if signs and symptoms develop. Advised that if patient falls and/or hits their head, they should seek medical attention. Verbalizes understanding.     Dosing instructions given to patient verbally over the phone. Advised to call the clinic with any questions or concerns. Patient verbalizes understanding. Has clinic number.    Anticoagulation Summary  As of 2022    INR goal:  2.5-3.5   TTR:  53.6 % (9.5 y)   INR used for dosin.8 (2022)   Warfarin maintenance plan:  4 mg (4 mg x 1) every day   Weekly warfarin total:  28 mg   Plan last modified:  Milana Chinchilla RN (2022)   Next INR check:  3/8/2022   Priority:  Follow-Up - 2 Weeks   Target end date:      Indications    Persistent atrial fibrillation (CMS/HCC) [I48.19]  History of aortic valve replacement [Z95.2]  Long term (current) use of anticoagulants [Z79.01]  Encounter for therapeutic drug monitoring [Z51.81]             Anticoagulation Episode Summary     INR check location:      Preferred lab:      Send INR reminders to:  ANTICOAG (OPEN ENROLLMENT) ACS CARD/EP    Comments:  *Next STAC due 22; *[facility] 2022; AAC/ACL; 4 mg tablets; Mechanical AVR in ; \"[place]\"      Anticoagulation Care Providers     Provider Role Specialty Phone number    Von Wolf MD Referring Internal Medicine - Clinical Cardiac Electrophysiology [phone]          Supervising provider: Pranav Rodriguez MD      
02-Dec-2017 06:52
30-Nov-2017 02:30
30-Nov-2017 13:32

## 2023-08-02 NOTE — PATIENT PROFILE ADULT. - LANGUAGE ASSISTANCE NEEDED
----- Message from STEPHANIE Mcmahon sent at 8/2/2023  9:10 AM EDT -----  All labs were normal. Yes-Patient/Caregiver accepts free interpretation services...

## 2024-07-19 NOTE — PROGRESS NOTE ADULT - PROBLEM SELECTOR PLAN 2
Prior Authorization Approval Through Part B Coverage    [facility]  Authorization Effective Date: 7/19/2024  Authorization Expiration Date: 12/31/2024  Approved Dose/Quantity:   Reference #:     Insurance Company: [employer] Part D - Phone [phone] Fax [phone]  Expected CoPay: $    CoPay Card Available:      Financial Assistance Needed: No  Which Pharmacy is filling the prescription: [id number] - [place], [place] - [address] AT [place] [address]  Pharmacy Notified: Yes  Patient Notified: The pharmacy will notify the patient.       - Patient with hydronephrosis of R kidney seen on CT A/P 2/2 right RP mass  - s/p ureteral stent placement with Ludwig on 11/2 by Urology  - cont tamsulosin & finasteride for likely BPH given enlarged prostate on CT A/P  - cont monitor UOP, Out 1850 yesterday

## 2025-07-24 NOTE — CONSULT NOTE ADULT - CONSULT REASON
Home Sleep Study Documentation    HOME STUDY DEVICE: Noxturnal no                                           Ronda 
HILTON, mod R hydro found on CT
Retroperitoneal mass
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
Adenocarcinoma
gram Negative bacteremia
pain management